# Patient Record
Sex: FEMALE | Race: ASIAN | Employment: FULL TIME | URBAN - METROPOLITAN AREA
[De-identification: names, ages, dates, MRNs, and addresses within clinical notes are randomized per-mention and may not be internally consistent; named-entity substitution may affect disease eponyms.]

---

## 2017-04-13 LAB
ANTIBODY SCREEN, EXTERNAL: NEGATIVE
CHLAMYDIA, EXTERNAL: NEGATIVE
HBSAG, EXTERNAL: NORMAL
HIV, EXTERNAL: NORMAL
N. GONORRHEA, EXTERNAL: NEGATIVE
RPR, EXTERNAL: NORMAL
RUBELLA, EXTERNAL: NORMAL
TYPE, ABO & RH, EXTERNAL: NORMAL

## 2017-08-20 ENCOUNTER — HOSPITAL ENCOUNTER (EMERGENCY)
Age: 37
Discharge: HOME | End: 2017-08-20
Attending: SPECIALIST | Admitting: SPECIALIST
Payer: COMMERCIAL

## 2017-08-20 VITALS
TEMPERATURE: 98.4 F | WEIGHT: 144 LBS | HEART RATE: 70 BPM | RESPIRATION RATE: 16 BRPM | BODY MASS INDEX: 20.62 KG/M2 | DIASTOLIC BLOOD PRESSURE: 63 MMHG | HEIGHT: 70 IN | SYSTOLIC BLOOD PRESSURE: 120 MMHG

## 2017-08-20 PROCEDURE — 81003 URINALYSIS AUTO W/O SCOPE: CPT

## 2017-08-20 PROCEDURE — 99283 EMERGENCY DEPT VISIT LOW MDM: CPT

## 2017-08-20 PROCEDURE — 76815 OB US LIMITED FETUS(S): CPT

## 2017-08-20 NOTE — IP AVS SNAPSHOT
Garret Beckford 
 
 
 [address] [place] [address] [address] Patient: Judie Leonard 
MRN: [id number] RSS:8/72/[id number] Current Discharge Medication List  
  
Notice You have not been prescribed any medications.

## 2017-08-20 NOTE — IP AVS SNAPSHOT
[address] Patient: Prisclila Heller 
MRN: [id number] RLT:5/29/4304 You are allergic to the following No active allergies Recent Documentation Height Weight BMI OB Status Smoking Status 1.778 m 65.3 kg 20.66 kg/m2 Pregnant Never Smoker About your hospitalization You were admitted on:  N/A You last received care in the:  [address] [place] [address] You were discharged on:  August 20, 2017 Unit phone number:  [phone] Why you were hospitalized Your primary diagnosis was:  Not on File Providers Seen During Your Hospitalizations Provider Role Specialty Primary office phone Isa Tijerina MD Attending Provider Obstetrics & Gynecology [phone] Your Primary Care Physician (PCP) Primary Care Physician Office Phone Office Fax UNKNOWN, PROVIDER ** None ** ** None ** Follow-up Information Follow up With Details Comments Contact Info Provider Unknown   Patient not available to ask Current Discharge Medication List  
  
Notice You have not been prescribed any medications. Discharge Instructions None Discharge Instructions Attachments/References PREGNANCY: WEEKS 22 TO 26 (ENGLISH) PREGNANCY: WHEN TO CALL (AFTER 20 WEEKS): GENERAL INFO (ENGLISH) Discharge Orders None Introducing [facility]! [facility] introduces [employer] patient portal. Now you can access parts of your medical record, email your doctor's office, and request medication refills online. 1. In your internet browser, go to [email or web address] 2. Click on the First Time User? Click Here link in the Sign In box. You will see the New Member Sign Up page. 3. Enter your [employer] Access Code exactly as it appears below.  You will not need to use this code after youve completed the sign-up process. If you do not sign up before the expiration date, you must request a new code. · [employer] Access Code: [email or web address] Expires: 11/18/2017  8:54 PM 
 
4. Enter the last four digits of your Social Security Number (xxxx) and Date of Birth (mm/dd/yyyy) as indicated and click Submit. You will be taken to the next sign-up page. 5. Create a [employer] ID. This will be your [employer] login ID and cannot be changed, so think of one that is secure and easy to remember. 6. Create a [employer] password. You can change your password at any time. 7. Enter your Password Reset Question and Answer. This can be used at a later time if you forget your password. 8. Enter your e-mail address. You will receive e-mail notification when new information is available in [address] 9. Click Sign Up. You can now view and download portions of your medical record. 10. Click the Download Summary menu link to download a portable copy of your medical information. If you have questions, please visit the Frequently Asked Questions section of the [employer] website. Remember, [employer] is NOT to be used for urgent needs. For medical emergencies, dial 911. Now available from your iPhone and Android! General Information Please provide this summary of care documentation to your next provider. Patient Signature:  ____________________________________________________________ Date:  ____________________________________________________________  
  
Caroline Stuart Provider Signature:  ____________________________________________________________ Date:  ____________________________________________________________ More Information Weeks 22 to 26 of Your Pregnancy: Care Instructions Your Care Instructions As you enter your 7th month of pregnancy at week 26, your baby's lungs are growing stronger and getting ready to breathe. You may notice that your baby responds to the sound of your or your partner's voice. You may also notice that your baby does less turning and twisting and more squirming or jerking. Jerking often means that your baby has the hiccups. Hiccups are perfectly normal and are only temporary. You may want to think about attending a childbirth preparation class. This is also a good time to start thinking about whether you want to have pain medicine during labor. Most pregnant women are tested for gestational diabetes between weeks 25 and 28. Gestational diabetes occurs when your blood sugar level gets too high when you're pregnant. The test is important, because you can have gestational diabetes and not know it. But the condition can cause problems for your baby. Follow-up care is a key part of your treatment and safety. Be sure to make and go to all appointments, and call your doctor if you are having problems. It's also a good idea to know your test results and keep a list of the medicines you take. How can you care for yourself at home? Ease discomfort from your baby's kicking · Change your position. Sometimes this will cause your baby to change position too. · Take a deep breath while you raise your arm over your head. Then breathe out while you drop your arm. Do Kegel exercises to prevent urine from leaking · You can do Kegel exercises while you stand or sit. ¨ Squeeze the same muscles you would use to stop your urine. Your belly and thighs should not move. ¨ Hold the squeeze for 3 seconds, and then relax for 3 seconds. ¨ Start with 3 seconds. Then add 1 second each week until you are able to squeeze for 10 seconds. ¨ Repeat the exercise 10 to 15 times for each session. Do three or more sessions each day. Ease or reduce swelling in your feet, ankles, hands, and fingers · If your fingers are puffy, take off your rings. · Do not eat high-salt foods, such as potato chips. · Prop up your feet on a stool or couch as much as possible. Sleep with pillows under your feet. · Do not stand for long periods of time or wear tight shoes. · Wear support stockings. Where can you learn more? Go to http://rodney-lambert.info/. Enter G264 in the search box to learn more about \"Weeks 22 to 26 of Your Pregnancy: Care Instructions. \" Current as of: March 16, 2017 Content Version: 11.3 © 9008-2662 [employer]. Care instructions adapted under license by [employer] (which disclaims liability or warranty for this information). If you have questions about a medical condition or this instruction, always ask your healthcare professional. [place] [address] any warranty or liability for your use of this information. Learning About When to Call Your Doctor During Pregnancy (After 20 Weeks) Your Care Instructions It's common to have concerns about what might be a problem during pregnancy. Although most pregnant women don't have any serious problems, it's important to know when to call your doctor if you have certain symptoms or signs of labor. These are general suggestions. Your doctor may give you some more information about when to call. When to call your doctor (after 20 weeks) Call 911 anytime you think you may need emergency care. For example, call if: 
· You have severe vaginal bleeding. · You have sudden, severe pain in your belly. · You passed out (lost consciousness). · You have a seizure. · You see or feel the umbilical cord. · You think you are about to deliver your baby and can't make it safely to the hospital. 
Call your doctor now or seek immediate medical care if: 
· You have vaginal bleeding. · You have belly pain. · You have a fever. · You have symptoms of preeclampsia, such as: 
¨ Sudden swelling of your face, hands, or feet. ¨ New vision problems (such as dimness or blurring). ¨ A severe headache. · You have a sudden release of fluid from your vagina. (You think your water broke.) · You think that you may be in labor. This means that you've had at least 4 contractions within 20 minutes or at least 8 contractions in an hour. · You notice that your baby has stopped moving or is moving much less than normal. 
· You have symptoms of a urinary tract infection. These may include: 
¨ Pain or burning when you urinate. ¨ A frequent need to urinate without being able to pass much urine. ¨ Pain in the flank, which is just below the rib cage and above the waist on either side of the back. ¨ Blood in your urine. Watch closely for changes in your health, and be sure to contact your doctor if: 
· You have vaginal discharge that smells bad. · You have skin changes, such as: ¨ A rash. ¨ Itching. ¨ Yellow color to your skin. · You have other concerns about your pregnancy. If you have labor signs at 37 weeks or more If you have signs of labor at 37 weeks or more, your doctor may tell you to call when your labor becomes more active. Symptoms of active labor include: 
· Contractions that are regular. · Contractions that are less than 5 minutes apart. · Contractions that are hard to talk through. Follow-up care is a key part of your treatment and safety. Be sure to make and go to all appointments, and call your doctor if you are having problems. It's also a good idea to know your test results and keep a list of the medicines you take. Where can you learn more? Go to http://rodney-lambert.info/. Enter  in the search box to learn more about \"Learning About When to Call Your Doctor During Pregnancy (After 20 Weeks). \" 
Current as of: March 16, 2017 Content Version: 11.3 © 3895-8210 [employer].  Care instructions adapted under license by [employer] (which disclaims liability or warranty for this information). If you have questions about a medical condition or this instruction, always ask your healthcare professional. [place] [address] any warranty or liability for your use of this information.

## 2017-08-20 NOTE — IP AVS SNAPSHOT
Summary of Care Report The Summary of Care report has been created to help improve care coordination. Users with access to [employer] or [address] (Web-based application) may access additional patient information including the Discharge Summary. If you are not currently a [address] user and need more information, please call the number listed below in the [place] [address] section and ask to be connected with Medical Records. Facility Information Name Address Phone [facility] [address] [facility] [address] [phone] Patient Information Patient Name Sex Areatha Charmaine Jordan ([id number]) Female 1980 Discharge Information Admitting Provider Service Area Unit Richa Andrade MD / [address] Labor & Delivery / [phone] Discharge Provider Discharge Date/Time Discharge Disposition Destination (none) 8/20/2017 (Pending) AHR (none) Patient Language Language ENGLISH [13] You are allergic to the following No active allergies Current Discharge Medication List  
  
Notice You have not been prescribed any medications. Follow-up Information Follow up With Details Comments Contact Info Provider Unknown   Patient not available to ask Discharge Instructions None Chart Review Routing History No Routing History on File

## 2017-08-21 LAB
APPEARANCE UR: CLEAR
BILIRUB UR QL: NEGATIVE
COLOR UR: YELLOW
GLUCOSE UR QL STRIP.AUTO: NEGATIVE MG/DL
KETONES UR-MCNC: NEGATIVE MG/DL
LEUKOCYTE ESTERASE UR QL STRIP: ABNORMAL
NITRITE UR QL: NEGATIVE
PH UR: 7 [PH] (ref 5–9)
PROT UR QL: NEGATIVE MG/DL
RBC # UR STRIP: ABNORMAL /UL
SERVICE CMNT-IMP: ABNORMAL
SP GR UR: 1.01 (ref 1–1.02)
UROBILINOGEN UR QL: 0.2 EU/DL (ref 0.2–1)

## 2017-08-21 NOTE — PROGRESS NOTES
2000 - Patient arrived to unit via wheelchair with complaints of vaginal pressure that has not subsided after it started 08/18/17 after a more than usually active day. Patient describes it as a dull pressure rates it at 4/10. Denies bleeding, leaking of fluid or decreased fetal movement. Dr. Jaret Bravo aware of patients admission. 2021 - Dr. Jaret Bravo at bedside for assessment of patient. SVE Closed/Thick/High per Dr. Jaret Bravo. 2030 - FHR monitor removed reassuring per Dr. Jaret rBavo. 2033 - Abdominal ultrasound done at bedside. 2039 - Patient discussing plan of care with Dr. Jaret Bravo. 2048 -  Orders to discharge with follow up to office.

## 2017-08-21 NOTE — H&P
[address] visit Moderate severity CPT 06531. Wet Smear CPT [id number]    Patient's Name:  Ketty Mcintyre. MRN: [id number]. : 1980. Date of Service:  2017  8:59 PM  Physician:  Landon Infante MD    IDENTIFYING DATA  Ketty Mcintyre is a  43 y.o. pregnant patient. Estimated Date of Delivery: 17  Her Estimated Gestational Age is 23w3d weeks. IMPRESSION:    [place]. Increase in pelvic pressure thereaf  Musculoskeletal discomfort with no  Evidence of cervical shortening      RECOMMENDATIONS:    Home to rest and avoid activities  That aggravate discomfort,  nothing per vagina for 3 weeks. Consider progesterone supplementation in light of previous  labor       Subjective:   Chief complaint:    Chief Complaint   Patient presents with    Abdominal Pain     Vaginal pressure     Estimated Date of Delivery: 17  OB History      Para Term  AB Living    7 4 1 3 2 4    SAB TAB Ectopic Molar Multiple Live Births     2    4          On admission membranes are  Intact  On admission EFM strip is obtained and is Category 1. She reports onset of symptoms at around This morning   She is admitted for Observation and evaluation for cervical weakening or  labor. This patient has 4 living children  And subsequently had a tubal ligation she had a tubal ligation reversed in 2017  She very promptly became pregnant but is now at increased risk for   labor based on a previous 30 week delivery. She went to [facility] and did a lot of walking in a [facility]. She also reports doing a lot of lifting and straining. Evaluation at the hospital revealed no laboring pattern and a   Reassuring fetal heart rate tracing.   Ultrasound revealed a reynolds vertex infant with good movement and tone normal amniotic fluid volume and an anterior placenta that was nNot low.   The abdominal view of the cervix  Revealed a length greater than 3 cm. Pelvic examination confirmed the cervix was a normal size and shape. There was no cervical dilation. I discussed the patient's situation  And answered her questions. She is to keep her appointment in  [facility] this next Thursday and to phone our office in the next  Couple of days to make plans regarding progesterone supplementation. OB HISTORY:    OB History      Para Term  AB Living    7 4 1 3 2 4    SAB TAB Ectopic Molar Multiple Live Births     2    4          PAST MEDICAL HISTORY: History reviewed. No pertinent past medical history. PAST SURGICAL HISTORY:   Past Surgical History:   Procedure Laterality Date    BREAST SURGERY PROCEDURE UNLISTED      HX BREAST AUGMENTATION Bilateral 2013    HX OTHER SURGICAL  2017    tubal reversal    HX TUBAL LIGATION         SOCIAL HISTORY:    Social History     Social History    Marital status:      Spouse name: N/A    Number of children: N/A    Years of education: N/A     Occupational History    Not on file. Social History Main Topics    Smoking status: Never Smoker    Smokeless tobacco: Never Used    Alcohol use No    Drug use: No    Sexual activity: Yes     Partners: Male     Other Topics Concern    Not on file     Social History Narrative    No narrative on file       FAMILY HISTORY  History reviewed. No pertinent family history. ALLERGY:  No Known Allergies    HOME MEDICATIONS:   Prior to Admission medications    Not on File              CERVICAL EXAM: (data input under \"more activities\" and \"flowsheets\" at the top.)  Cervical Exam  Dilation (cm): 0 (closed)  Vaginal exam done by? : Dr. Bryan Rojo  Membrane Status: Intact    FETAL MONITORING:  Baseline FHR: Patient Vitals for the past 2 hrs: Mode Fetal Heart Rate Fetal Activity Variability Decelerations Accelerations RN Reviewed Strip? Provider who reviewed strip?  Non Stress Test 08/20/17 2031 External 150 Present 6-25 BPM None Yes Yes Dr. Celestino Cordero  Reactive   08/20/17 2014 External 150 Present - - - - - -       REVIEW OF SYMPTOMS:   Constitutional: fever, chills denied. Head, Ears, Nose Throat:   Ear pain denied. Sore throat denied. Eyes: Pain denied. Visual disturbance denied. Respiratory: Cough denied. Shortness of breath denied. Cardiovascular: Chest pains denied. Gastrointestinal:   Nausea denied. Vomiting denied. Diarrhea denied. Constipation denied. Genitourinary:   Vaginal Bleeding denied. Vaginal Odor denied. Painful Urination denied. Blood in Urine denied. Pain over kidneys denied. Musculoskeletal: Back pain denied, Joint pain denied. Skin:  Rash denied. Injuries denied. Neurological:    Headaches denied. Dizziness denied. Seizures denied. Psychiatric/Behavioral:   Major mood problems denied. Objective:     Physical Exam:     Visit Vitals    /63    Pulse 70    Temp 98.4 °F (36.9 °C)    Resp 16    Ht 5' 10\" (1.778 m)    Wt 65.3 kg (144 lb)    BMI 20.66 kg/m2     General appearance:   Alert, oriented to person, place, and time, cooperative, no distress, appears stated age and is well-developed and well-nourished. Head, Nose, Throat: Normocephalic, atraumatic. Eyes: Conjunctivae appear normal.  Pupils are equal and round. Neck: Normal range of motion. Supple. Heart:  Regular rate and Rhythm. Lungs: Effort normal, No apparent respiratory distress, Wheezes or Cough. Abdomen: Abnormal tenderness not detected. Scars None detected. External genitalia: normal appearance without obvious lesions. Bartholin's,  [place]ake[place]'s, Urethra: Normal.   Vaginal Vault:  Discharge or Odor not detected. Blood not detected. Foreign body or Injury not detected. Cervix was long thick and closed. Uterus:  Appropriate size for gestational age. Musculoskeletal: Normal range of motion.    Neurological: Loss of strength or sensation not detected. Disorientation to time, person, place not detected. Skin: Lesions not detected. Rashes not detected. Extremities:  Trauma not detected. Swelling or edema not detected. Psychiatric: Abnormal mood or affect not detected.      Signed By:    Ronna Srinivasan MD  August 20, 2017 8:59 PM

## 2017-09-10 ENCOUNTER — HOSPITAL ENCOUNTER (OUTPATIENT)
Age: 37
Setting detail: OBSERVATION
Discharge: HOME | DRG: 778 | End: 2017-09-11
Attending: SPECIALIST | Admitting: OBSTETRICS & GYNECOLOGY
Payer: COMMERCIAL

## 2017-09-10 PROBLEM — O09.219 CURRENT PREGNANCY WITH HISTORY OF PRETERM LABOR: Status: ACTIVE | Noted: 2017-09-10

## 2017-09-10 LAB
APPEARANCE UR: CLEAR
BASOPHILS # BLD: 0 K/UL (ref 0–0.06)
BASOPHILS NFR BLD: 0 % (ref 0–2)
BILIRUB UR QL: NEGATIVE
COLOR UR: YELLOW
DIFFERENTIAL METHOD BLD: ABNORMAL
EOSINOPHIL # BLD: 0 K/UL (ref 0–0.4)
EOSINOPHIL NFR BLD: 1 % (ref 0–5)
ERYTHROCYTE [DISTWIDTH] IN BLOOD BY AUTOMATED COUNT: 12.8 % (ref 11.6–14.5)
GLUCOSE UR QL STRIP.AUTO: NEGATIVE MG/DL
HCT VFR BLD AUTO: 33.7 % (ref 35–45)
HGB BLD-MCNC: 11.3 G/DL (ref 12–16)
KETONES UR-MCNC: NEGATIVE MG/DL
LEUKOCYTE ESTERASE UR QL STRIP: NEGATIVE
LYMPHOCYTES # BLD: 0.7 K/UL (ref 0.9–3.6)
LYMPHOCYTES NFR BLD: 10 % (ref 21–52)
MCH RBC QN AUTO: 29.7 PG (ref 24–34)
MCHC RBC AUTO-ENTMCNC: 33.5 G/DL (ref 31–37)
MCV RBC AUTO: 88.5 FL (ref 74–97)
MONOCYTES # BLD: 0.3 K/UL (ref 0.05–1.2)
MONOCYTES NFR BLD: 4 % (ref 3–10)
NEUTS SEG # BLD: 6.3 K/UL (ref 1.8–8)
NEUTS SEG NFR BLD: 85 % (ref 40–73)
NITRITE UR QL: NEGATIVE
PH UR: 7 [PH] (ref 5–9)
PLATELET # BLD AUTO: 156 K/UL (ref 135–420)
PMV BLD AUTO: 11.3 FL (ref 9.2–11.8)
PROT UR QL: NEGATIVE MG/DL
RBC # BLD AUTO: 3.81 M/UL (ref 4.2–5.3)
RBC # UR STRIP: ABNORMAL /UL
SERVICE CMNT-IMP: ABNORMAL
SP GR UR: 1.01 (ref 1–1.02)
UROBILINOGEN UR QL: 1 EU/DL (ref 0.2–1)
WBC # BLD AUTO: 7.4 K/UL (ref 4.6–13.2)

## 2017-09-10 PROCEDURE — 85025 COMPLETE CBC W/AUTO DIFF WBC: CPT | Performed by: OBSTETRICS & GYNECOLOGY

## 2017-09-10 PROCEDURE — 74011250636 HC RX REV CODE- 250/636: Performed by: OBSTETRICS & GYNECOLOGY

## 2017-09-10 PROCEDURE — 87086 URINE CULTURE/COLONY COUNT: CPT | Performed by: OBSTETRICS & GYNECOLOGY

## 2017-09-10 PROCEDURE — 65270000029 HC RM PRIVATE

## 2017-09-10 PROCEDURE — 99218 HC RM OBSERVATION: CPT

## 2017-09-10 PROCEDURE — 81003 URINALYSIS AUTO W/O SCOPE: CPT

## 2017-09-10 PROCEDURE — 74011250637 HC RX REV CODE- 250/637: Performed by: SPECIALIST

## 2017-09-10 PROCEDURE — 74011250637 HC RX REV CODE- 250/637: Performed by: OBSTETRICS & GYNECOLOGY

## 2017-09-10 PROCEDURE — 77030020255 HC SOL INJ LR 1000ML BG

## 2017-09-10 PROCEDURE — 36415 COLL VENOUS BLD VENIPUNCTURE: CPT | Performed by: OBSTETRICS & GYNECOLOGY

## 2017-09-10 PROCEDURE — 87081 CULTURE SCREEN ONLY: CPT | Performed by: OBSTETRICS & GYNECOLOGY

## 2017-09-10 RX ORDER — SODIUM CHLORIDE, SODIUM LACTATE, POTASSIUM CHLORIDE, CALCIUM CHLORIDE 600; 310; 30; 20 MG/100ML; MG/100ML; MG/100ML; MG/100ML
150 INJECTION, SOLUTION INTRAVENOUS CONTINUOUS
Status: DISCONTINUED | OUTPATIENT
Start: 2017-09-10 | End: 2017-09-11 | Stop reason: HOSPADM

## 2017-09-10 RX ORDER — DIPHENHYDRAMINE HYDROCHLORIDE 50 MG/ML
12.5 INJECTION, SOLUTION INTRAMUSCULAR; INTRAVENOUS
Status: DISCONTINUED | OUTPATIENT
Start: 2017-09-10 | End: 2017-09-11 | Stop reason: HOSPADM

## 2017-09-10 RX ORDER — ZOLPIDEM TARTRATE 5 MG/1
5 TABLET ORAL
Status: DISCONTINUED | OUTPATIENT
Start: 2017-09-10 | End: 2017-09-11 | Stop reason: HOSPADM

## 2017-09-10 RX ORDER — BETAMETHASONE SODIUM PHOSPHATE AND BETAMETHASONE ACETATE 3; 3 MG/ML; MG/ML
INJECTION, SUSPENSION INTRA-ARTICULAR; INTRALESIONAL; INTRAMUSCULAR; SOFT TISSUE
Status: DISPENSED
Start: 2017-09-10 | End: 2017-09-11

## 2017-09-10 RX ORDER — ONDANSETRON 2 MG/ML
4 INJECTION INTRAMUSCULAR; INTRAVENOUS
Status: DISCONTINUED | OUTPATIENT
Start: 2017-09-10 | End: 2017-09-11 | Stop reason: HOSPADM

## 2017-09-10 RX ORDER — BETAMETHASONE SODIUM PHOSPHATE AND BETAMETHASONE ACETATE 3; 3 MG/ML; MG/ML
12 INJECTION, SUSPENSION INTRA-ARTICULAR; INTRALESIONAL; INTRAMUSCULAR; SOFT TISSUE EVERY 24 HOURS
Status: COMPLETED | OUTPATIENT
Start: 2017-09-10 | End: 2017-09-11

## 2017-09-10 RX ORDER — SUCRALFATE 1 G/1
1 TABLET ORAL
Status: DISCONTINUED | OUTPATIENT
Start: 2017-09-10 | End: 2017-09-11 | Stop reason: HOSPADM

## 2017-09-10 RX ORDER — SUCRALFATE 1 G/1
1 TABLET ORAL
Status: DISCONTINUED | OUTPATIENT
Start: 2017-09-10 | End: 2017-09-10

## 2017-09-10 RX ORDER — SODIUM CHLORIDE 0.9 % (FLUSH) 0.9 %
5-10 SYRINGE (ML) INJECTION EVERY 8 HOURS
Status: DISCONTINUED | OUTPATIENT
Start: 2017-09-10 | End: 2017-09-11 | Stop reason: HOSPADM

## 2017-09-10 RX ORDER — INDOMETHACIN 25 MG/1
50 CAPSULE ORAL EVERY 6 HOURS
Status: DISCONTINUED | OUTPATIENT
Start: 2017-09-10 | End: 2017-09-11 | Stop reason: HOSPADM

## 2017-09-10 RX ORDER — SODIUM CHLORIDE 0.9 % (FLUSH) 0.9 %
5-10 SYRINGE (ML) INJECTION AS NEEDED
Status: DISCONTINUED | OUTPATIENT
Start: 2017-09-10 | End: 2017-09-11 | Stop reason: HOSPADM

## 2017-09-10 RX ADMIN — SUCRALFATE 1 G: 1 TABLET ORAL at 17:57

## 2017-09-10 RX ADMIN — SODIUM CHLORIDE, SODIUM LACTATE, POTASSIUM CHLORIDE, AND CALCIUM CHLORIDE 1000 ML: 600; 310; 30; 20 INJECTION, SOLUTION INTRAVENOUS at 14:48

## 2017-09-10 RX ADMIN — INDOMETHACIN 50 MG: 25 CAPSULE ORAL at 19:51

## 2017-09-10 RX ADMIN — SODIUM CHLORIDE, SODIUM LACTATE, POTASSIUM CHLORIDE, AND CALCIUM CHLORIDE 150 ML/HR: 600; 310; 30; 20 INJECTION, SOLUTION INTRAVENOUS at 22:02

## 2017-09-10 RX ADMIN — SUCRALFATE 1 G: 1 TABLET ORAL at 21:54

## 2017-09-10 RX ADMIN — SUCRALFATE 1 G: 1 TABLET ORAL at 13:45

## 2017-09-10 RX ADMIN — ZOLPIDEM TARTRATE 5 MG: 5 TABLET ORAL at 21:54

## 2017-09-10 RX ADMIN — INDOMETHACIN 50 MG: 25 CAPSULE ORAL at 13:44

## 2017-09-10 RX ADMIN — SODIUM CHLORIDE, SODIUM LACTATE, POTASSIUM CHLORIDE, AND CALCIUM CHLORIDE 150 ML/HR: 600; 310; 30; 20 INJECTION, SOLUTION INTRAVENOUS at 14:50

## 2017-09-10 RX ADMIN — BETAMETHASONE SODIUM PHOSPHATE AND BETAMETHASONE ACETATE 12 MG: 3; 3 INJECTION, SUSPENSION INTRA-ARTICULAR; INTRALESIONAL; INTRAMUSCULAR at 13:10

## 2017-09-10 NOTE — IP AVS SNAPSHOT
Summary of Care Report The Summary of Care report has been created to help improve care coordination. Users with access to [employer] or [address] (Web-based application) may access additional patient information including the Discharge Summary. If you are not currently a [address] user and need more information, please call the number listed below in the [place] [address] section and ask to be connected with Medical Records. Facility Information Name Address Phone [facility] [address] [facility] [address] [id number] [phone] Patient Information Patient Name Sex [place] Jeni Dotson ([id number]) Female 1980 Discharge Information Admitting Provider Service Area Unit Norberto Lazcano MD / [address] Labor & Delivery / [phone] Discharge Provider Discharge Date/Time Discharge Disposition Destination (none) 2017 Afternoon (Pending) AHR (none) Patient Language Language ENGLISH [13] Hospital Problems as of 2017  Never Reviewed Class Noted - Resolved Last Modified POA Active Problems Current pregnancy with history of  labor  9/10/2017 - Present 9/10/2017 by Norberto Lazcano MD Unknown Entered by Norberto Lazcano MD  
  
You are allergic to the following No active allergies Current Discharge Medication List  
  
Notice You have not been prescribed any medications. Follow-up Information Follow up With Details Comments Contact Info Provider Unknown   Patient not available to ask Discharge Instructions Call Dr Karuna Buchanan if contractions, bleeding, decreased fetal movement or any other concerns Chart Review Routing History No Routing History on File

## 2017-09-10 NOTE — PROGRESS NOTES
44 yo aaf,  ambulating in from home with c/o uc since last night q 30-60  Min, 4/10. Pt is able to slepp through uc, eric srom, denies vag bleeding,denies PIH s/s    Hx ptl 40, 30 36, 36 weeks. Last vag delivery 14 yrs ago  sve and ffn done in dr Huerta office by mychal on Friday,2 days ago.  Pt was long thick and closed (appt for pelvic pressure) ffn is positive

## 2017-09-10 NOTE — H&P
History & Physical    Name: Lakeshia De La Torre MRN: [id number]  SSN: [id number]    YOB: 1980  Age: 43 y.o. Sex: female        Subjective:     Estimated Date of Delivery: 17  OB History      Para Term  AB Living    7 4 1 3 2 4    SAB TAB Ectopic Molar Multiple Live Births     2    4          Ms. Nancye Closs is admitted with pregnancy at 26w3d for ctx. Pt states had been fine until I called her yesterday AM and dw her +FFN result from office Friday. Since then pt has not felt good. States legs are crampy and note some ctx maria eugenia 15 - 30 minutes that are different than the [employer] CTx she felt earlier. States did not get Ol[place] but has been followed by serial cervical lengths and has been over 3.5 cm. . Prenatal course noted for h/o PT D at 30 weeks and 2 deliveries at 36 weeks. Pt had tubal reversal and quickly got pregnant afterwards. Last delivery over 10 years ago. Prenatal care has been followed by Dr. Osiel Thomas. Please see prenatal records for details. Pt has not had any vaginal bleeding. Denies Dysuria or SROM. No past medical history on file. Past Surgical History:   Procedure Laterality Date    BREAST SURGERY PROCEDURE UNLISTED      HX BREAST AUGMENTATION Bilateral 2013    HX OTHER SURGICAL  2017    tubal reversal    HX TUBAL LIGATION       Social History     Occupational History    Not on file. Social History Main Topics    Smoking status: Never Smoker    Smokeless tobacco: Never Used    Alcohol use No    Drug use: No    Sexual activity: Yes     Partners: Male     No family history on file. No Known Allergies  Prior to Admission medications    Not on File        Review of Systems: Pertinent items are noted in HPI. Objective:     Vitals:  Vitals:    09/10/17 1235   Weight: 70.8 kg (156 lb)   Height: 5' 10\" (1.778 m)        Physical Exam:  Patient without distress.   Abdomen: soft, nontender  Fundus: soft and non tender  Perineum: blood absent, amniotic fluid absent  Cervical Exam: Consistency: Soft  External Os 5 mm/50%/-3  Intenal os clds. Lower Extremities:  - Edema No  Membranes:  Intact  Fetal Heart Rate: Appropriate for 26 wks without decelerations  Uterine contractions: regular, every 6 minutes, difficult to palpate and trace    Prenatal Labs:   No results found for: RUBELLAEXT, GRBSEXT, HBSAGEXT, HIVEXT, RPREXT, GONNOEXT, CHLAMEXT      Assessment/Plan:     Plan: Admit for  ctx in light of FFN + and history of PTD at 30 and 36 wks x2. D/W pt need for corticosteroids and familiar  with them. Will use some idocin to see if helps with ctx. She will need to let us know how she is feeling as difficult to trace.   . Will send GBS and urine culture     Signed By:  Carlo Cardenas MD     September 10, 2017

## 2017-09-10 NOTE — IP AVS SNAPSHOT
Drake Cabrera 
 
 
 [address] [place] [address] Patient: Luis Huber 
MRN: [id number] DQY: You are allergic to the following No active allergies Recent Documentation Height Weight BMI OB Status Smoking Status 1.778 m 70.8 kg 22.38 kg/m2 Pregnant Never Smoker Unresulted Labs Order Current Status CULTURE, GENITAL GROUP B STREP Preliminary result CULTURE, URINE Preliminary result Emergency Contacts Name Discharge Info Relation Home Work Mobile Supa Swanson DISCHARGE CAREGIVER [3] Spouse [3]   [phone] About your hospitalization You were admitted on:  September 10, 2017 You last received care in the:  [address] [place] [address] You were discharged on:  2017 Unit phone number:  [phone] Why you were hospitalized Your primary diagnosis was:  Not on File Your diagnoses also included:  Current Pregnancy With History Of  Labor Providers Seen During Your Hospitalizations Provider Role Specialty Primary office phone Mikhail Neely MD Attending Provider Obstetrics & Gynecology [phone] Adele Medrano MD Attending Provider Obstetrics & Gynecology [phone] Your Primary Care Physician (PCP) Primary Care Physician Office Phone Office Fax UNKNOWN, PROVIDER ** None ** ** None ** Follow-up Information Follow up With Details Comments Contact Info Provider Unknown   Patient not available to ask Current Discharge Medication List  
  
Notice You have not been prescribed any medications. Discharge Instructions Call Dr Dinorah Elmore if contractions, bleeding, decreased fetal movement or any other concerns Discharge Instructions Attachments/References PREGNANCY: PRECAUTIONS (ENGLISH) Discharge Orders None MyChart Announcement We are excited to announce that we are making your provider's discharge notes available to you in [facility]. You will see these notes when they are completed and signed by the physician that discharged you from your recent hospital stay. If you have any questions or concerns about any information you see in [facility], please call the Health Information Department where you were seen or reach out to your Primary Care Provider for more information about your plan of care. Introducing [facility]! Suzanne Hutson introduces [facility] patient portal. Now you can access parts of your medical record, email your doctor's office, and request medication refills online. 1. In your internet browser, go to [email or web address] 2. Click on the First Time User? Click Here link in the Sign In box. You will see the New Member Sign Up page. 3. Enter your [facility] Access Code exactly as it appears below. You will not need to use this code after youve completed the sign-up process. If you do not sign up before the expiration date, you must request a new code. · [facility] Access Code: [email or web address] Expires: 11/18/2017  8:54 PM 
 
4. Enter the last four digits of your Social Security Number (xxxx) and Date of Birth (mm/dd/yyyy) as indicated and click Submit. You will be taken to the next sign-up page. 5. Create a [facility] ID. This will be your [facility] login ID and cannot be changed, so think of one that is secure and easy to remember. 6. Create a [facility] password. You can change your password at any time. 7. Enter your Password Reset Question and Answer. This can be used at a later time if you forget your password. 8. Enter your e-mail address. You will receive e-mail notification when new information is available in [address] 9. Click Sign Up. You can now view and download portions of your medical record.  
10. Click the Download Summary menu link to download a portable copy of your medical information. If you have questions, please visit the Frequently Asked Questions section of the [facility] website. Remember, [facility] is NOT to be used for urgent needs. For medical emergencies, dial 911. Now available from your iPhone and Android! General Information Please provide this summary of care documentation to your next provider. Patient Signature:  ____________________________________________________________ Date:  ____________________________________________________________  
  
Jeyson Dawson Provider Signature:  ____________________________________________________________ Date:  ____________________________________________________________ More Information Pregnancy Precautions: Care Instructions Your Care Instructions There is no sure way to prevent labor before your due date ( labor) or to prevent most other pregnancy problems. But there are things you can do to increase your chances of a healthy pregnancy. Go to your appointments, follow your doctor's advice, and take good care of yourself. Eat well, and exercise (if your doctor agrees). And make sure to drink plenty of water. Follow-up care is a key part of your treatment and safety. Be sure to make and go to all appointments, and call your doctor if you are having problems. It's also a good idea to know your test results and keep a list of the medicines you take. How can you care for yourself at home? · Make sure you go to your prenatal appointments. At each visit, your doctor will check your blood pressure. Your doctor will also check to see if you have protein in your urine. High blood pressure and protein in urine are signs of preeclampsia. This condition can be dangerous for you and your baby. · Drink plenty of fluids, enough so that your urine is light yellow or clear like water. Dehydration can cause contractions.  If you have kidney, heart, or liver disease and have to limit fluids, talk with your doctor before you increase the amount of fluids you drink. · Tell your doctor right away if you notice any symptoms of an infection, such as: ¨ Burning when you urinate. ¨ A foul-smelling discharge from your vagina. ¨ Vaginal itching. ¨ Unexplained fever. ¨ Unusual pain or soreness in your uterus or lower belly. · Eat a balanced diet. Include plenty of foods that are high in calcium and iron. ¨ Foods high in calcium include milk, cheese, yogurt, almonds, and broccoli. ¨ Foods high in iron include red meat, shellfish, poultry, eggs, beans, raisins, whole-grain bread, and leafy green vegetables. · Do not smoke. If you need help quitting, talk to your doctor about stop-smoking programs and medicines. These can increase your chances of quitting for good. · Do not drink alcohol or use illegal drugs. · Follow your doctor's directions about activity. Your doctor will let you know how much, if any, exercise you can do. · Ask your doctor if you can have sex. If you are at risk for early labor, your doctor may ask you to not have sex. · Take care to prevent falls. During pregnancy, your joints are loose, and your balance is off. Sports such as bicycling, skiing, or in-line skating can increase your risk of falling. And don't ride horses or motorcycles, dive, water ski, scuba dive, or parachute jump while you are pregnant. · Avoid getting very hot. Do not use saunas or hot tubs. Avoid staying out in the sun in hot weather for long periods. Take acetaminophen (Tylenol) to lower a high fever. · Do not take any over-the-counter or herbal medicines or supplements without talking to your doctor or pharmacist first. 
When should you call for help? Call 911 anytime you think you may need emergency care. For example, call if: 
· You passed out (lost consciousness). · You have severe vaginal bleeding. · You have severe pain in your belly or pelvis. · You have had fluid gushing or leaking from your vagina and you know or think the umbilical cord is bulging into your vagina. If this happens, immediately get down on your knees so your rear end (buttocks) is higher than your head. This will decrease the pressure on the cord until help arrives. Call your doctor now or seek immediate medical care if: 
· You have signs of preeclampsia, such as: 
¨ Sudden swelling of your face, hands, or feet. ¨ New vision problems (such as dimness or blurring). ¨ A severe headache. · You have any vaginal bleeding. · You have belly pain or cramping. · You have a fever. · You have had 6 or more contractions in 1 hour with rest & fluids. · You have a sudden release of fluid from your vagina. · You have low back pain or pelvic pressure that does not go away. · You notice that your baby has stopped moving or is moving much less than normal. 
Watch closely for changes in your health, and be sure to contact your doctor if you have any problems. Where can you learn more? Go to http://rodney-lambert.info/. Enter [id number] in the search box to learn more about \"Pregnancy Precautions: Care Instructions. \" Current as of: March 16, 2017 Content Version: 11.3 © 0615-7160 [employer]. Care instructions adapted under license by [employer] (which disclaims liability or warranty for this information). If you have questions about a medical condition or this instruction, always ask your healthcare professional. [place] [address] any warranty or liability for your use of this information.

## 2017-09-11 VITALS
SYSTOLIC BLOOD PRESSURE: 111 MMHG | DIASTOLIC BLOOD PRESSURE: 61 MMHG | HEART RATE: 79 BPM | HEIGHT: 70 IN | TEMPERATURE: 98.6 F | BODY MASS INDEX: 22.33 KG/M2 | WEIGHT: 156 LBS | RESPIRATION RATE: 16 BRPM

## 2017-09-11 PROCEDURE — 59025 FETAL NON-STRESS TEST: CPT

## 2017-09-11 PROCEDURE — 74011250637 HC RX REV CODE- 250/637: Performed by: OBSTETRICS & GYNECOLOGY

## 2017-09-11 PROCEDURE — 96372 THER/PROPH/DIAG INJ SC/IM: CPT

## 2017-09-11 PROCEDURE — 96361 HYDRATE IV INFUSION ADD-ON: CPT

## 2017-09-11 PROCEDURE — 96360 HYDRATION IV INFUSION INIT: CPT

## 2017-09-11 PROCEDURE — 74011250636 HC RX REV CODE- 250/636: Performed by: OBSTETRICS & GYNECOLOGY

## 2017-09-11 PROCEDURE — 99218 HC RM OBSERVATION: CPT

## 2017-09-11 PROCEDURE — 99285 EMERGENCY DEPT VISIT HI MDM: CPT

## 2017-09-11 PROCEDURE — 77030020255 HC SOL INJ LR 1000ML BG

## 2017-09-11 RX ADMIN — BETAMETHASONE SODIUM PHOSPHATE AND BETAMETHASONE ACETATE 12 MG: 3; 3 INJECTION, SUSPENSION INTRA-ARTICULAR; INTRALESIONAL; INTRAMUSCULAR at 13:20

## 2017-09-11 RX ADMIN — INDOMETHACIN 50 MG: 25 CAPSULE ORAL at 02:32

## 2017-09-11 NOTE — ROUTINE PROCESS
Bedside shift change report given to Veronica Babb RN (oncoming nurse) by Connie Aguirre RN (offgoing nurse). Report included the following information SBAR.

## 2017-09-11 NOTE — PROGRESS NOTES
Updated Dr. Marciano Zaragoza via telephone on patient disposition, including contraction pattern. 0730 Indocin to be held if patient continues to deny contractions.

## 2017-09-11 NOTE — PROGRESS NOTES
Medication administered at this time; please see MAR. Patient denies any contractions or lower abdominal pressure. Patient reports mild nausea with medication administration; denies any intervention at this time.

## 2017-09-11 NOTE — ROUTINE PROCESS
Bedside and Verbal shift change report given to Jeff Castellon (oncoming nurse) by [facility] RN (offgoing nurse). Report included the following information SBAR, Procedure Summary, Intake/Output, MAR and Recent Results.

## 2017-09-11 NOTE — DISCHARGE SUMMARY
Obstetrical Discharge Summary     Name: Sylvia Hawthorne MRN: [id number]  SSN: [id number]    YOB: 1980  Age: 43 y.o. Sex: female      Admit Date: 9/10/2017    Discharge Date: 2017     Admitting Physician: Heidi Ruiz MD     Attending Physician:  Traci Clifton MD     Admission Diagnoses: L&D Assessment  Current pregnancy with history of  labor, second trimester    Discharge Diagnoses:    contractions resolved    Additional Diagnoses:   Hospital Problems  Never Reviewed          Codes Class Noted POA    Current pregnancy with history of  labor ICD-10-CM: O09.219  ICD-9-CM: V23.41  9/10/2017 Unknown           No results found for: RUBELLAEXT, GRBSEXT    Immunization(s):   There is no immunization history on file for this patient. Labs:   Recent Results (from the past 24 hour(s))   POC URINE MACROSCOPIC    Collection Time: 09/10/17  1:06 PM   Result Value Ref Range    Color YELLOW      Appearance CLEAR      Spec. gravity (POC) 1.015 1.001 - 1.023      pH, urine  (POC) 7.0 5.0 - 9.0      Protein (POC) NEGATIVE  NEG mg/dL    Glucose, urine (POC) NEGATIVE  NEG mg/dL    Ketones (POC) NEGATIVE  NEG mg/dL    Bilirubin (POC) NEGATIVE  NEG      Blood (POC) Trace Intact (A) NEG      Urobilinogen (POC) 1.0 0.2 - 1.0 EU/dL    Nitrite (POC) NEGATIVE  NEG      Leukocyte esterase (POC) NEGATIVE  NEG      Performed by [facility]    CBC WITH AUTOMATED DIFF    Collection Time: 09/10/17  2:45 PM   Result Value Ref Range    WBC 7.4 4.6 - 13.2 K/uL    RBC 3.81 (L) 4.20 - 5.30 M/uL    HGB 11.3 (L) 12.0 - 16.0 g/dL    HCT 33.7 (L) 35.0 - 45.0 %    MCV 88.5 74.0 - 97.0 FL    MCH 29.7 24.0 - 34.0 PG    MCHC 33.5 31.0 - 37.0 g/dL    RDW 12.8 11.6 - 14.5 %    PLATELET 507 546 - 929 K/uL    MPV 11.3 9.2 - 11.8 FL    NEUTROPHILS 85 (H) 40 - 73 %    LYMPHOCYTES 10 (L) 21 - 52 %    MONOCYTES 4 3 - 10 %    EOSINOPHILS 1 0 - 5 %    BASOPHILS 0 0 - 2 %    ABS.  NEUTROPHILS 6.3 1.8 - 8.0 K/UL    ABS. LYMPHOCYTES 0.7 (L) 0.9 - 3.6 K/UL    ABS. MONOCYTES 0.3 0.05 - 1.2 K/UL    ABS. EOSINOPHILS 0.0 0.0 - 0.4 K/UL    ABS. BASOPHILS 0.0 0.0 - 0.06 K/UL    DF AUTOMATED         Exam:    Fundus firm and nontender  Bowel sounds are normal  Legs are normal without tenderness, swelling, or redness    Hospital Course: Patient was admitted to L&D at 26.4 weeks with contractions and positive FFN done at Dr Preston Ceballos office on Friday. Cervix closed but uterine contractions noted. Started on indocin and carafate and betamethasone series given. On HD 2 contractions resolved. Patient feeling well. Second dose of steroids given prior to discharge. Sent home on pelvic rest with PTL precautions. F/u Dr Dinorah Elmore 2 days    Patient Instructions: There are no discharge medications for this patient. Reference my discharge instructions. Follow-up Appointments   Procedures    FOLLOW UP VISIT Appointment in: Other (Specify) 2 days with Dr Dinorah Elmore     2 days with Dr Dinorah Elmore     Standing Status:   Standing     Number of Occurrences:   1     Order Specific Question:   Appointment in     Answer:    Other (Specify)        Signed By:  Ravin Ames MD     September 11, 2017

## 2017-09-11 NOTE — PROGRESS NOTES
~~ 0720 ASSUME CARE OF PT RESTING ON L SIDE, SR UP X2, CB IN REACH, NO SUPPORT PERSON HERE-  PT C/O HIP PAIN FR SIDE LYING-- TOLD OK TO REPOS-- PT UP TO BR TO VOID QS W/O DIFF--  EXTRA BLANKET PUT ONTO BED (TO SIT ON) TO TRY TO MAKE MORE COMF--    ~~0725 PT BACK TO BED, SITTING FOR COMF-  PT DENIES CRAMPING, CTX, PRESSURE, BACK PAIN OR HIP PAIN NOW. DENIES VAG BLEEDING OR SROM. +FM PER PT-  FHT HAS BEEN REACTIVE. PT C/O BEING HUNGRY-- WILL CHECK W/ MD ABOUT DIET    ~~ 0730 PT SERVED [facility]- DR KING IN TO SEE PT & OK'd REG DIET-- ORDERED    ~~ 0815 REG DIET BREAKFAST TRAY SERVED-    ~~ 0900 PT DID NOT LIKE FOOD SO CALLED DIETARY FOR REPLACEMENT--      ~~0930 PT EATING FRUIT FR REPLACED BREAKFAST TRAY--  BABY ACTIVE, FM AUDIBLE & TRACING BROKEN--  EFM & TOCO REPOS-     ~~1014 PT GETS SELF UP TO BR TO VOID QS W/O DIFF--  FRIENDS IN [place] TO VISIT THEN OUT    ~~1145 REG DIET LUNCH TRAY SERVED. NO NEEDS VOICED. PT OCCAS UP TO STRETCH IN THE ROOM AS BED IS NOT COMF--    ~~1257 EFM OFF FOR PT TO GO TO BR-- FHT HAS BEEN REASSURING.     ~~1310 HL REMOVED-     ~~1320 2nd BMTZ GIVEN IN L GLUT-- PT MARIANA WELL.  PT HAS CALLED HUSB TO COME PICK HER UP--  PER REQ PT GIVEN SHOWERING SUPPLIES TO GET CLEANED UP--    ~~1345 PT HAS SHOWERED- PT GIVEN WRITTEN & VERBAL D/C INSTRUCTIONS-- PT WAITING FOR HUSB- GOING TO 3418 TO VISIT NIECE  BEFORE LEAVING-- PT HAS APPT TOMORROW W/ DR Memo Chen

## 2017-09-12 LAB
BACTERIA SPEC CULT: NORMAL
SERVICE CMNT-IMP: NORMAL

## 2017-09-13 LAB
BACTERIA SPEC CULT: NEGATIVE
SERVICE CMNT-IMP: NORMAL

## 2017-09-29 ENCOUNTER — HOSPITAL ENCOUNTER (EMERGENCY)
Age: 37
Discharge: HOME | End: 2017-09-29
Attending: SPECIALIST | Admitting: OBSTETRICS & GYNECOLOGY
Payer: COMMERCIAL

## 2017-09-29 VITALS
SYSTOLIC BLOOD PRESSURE: 117 MMHG | DIASTOLIC BLOOD PRESSURE: 71 MMHG | RESPIRATION RATE: 18 BRPM | HEART RATE: 92 BPM | TEMPERATURE: 97.9 F

## 2017-09-29 LAB
APPEARANCE UR: CLEAR
BILIRUB UR QL: NEGATIVE
COLOR UR: YELLOW
GLUCOSE UR QL STRIP.AUTO: NEGATIVE MG/DL
KETONES UR-MCNC: NEGATIVE MG/DL
LEUKOCYTE ESTERASE UR QL STRIP: NEGATIVE
NITRITE UR QL: NEGATIVE
PH UR: 6.5 [PH] (ref 5–9)
PROT UR QL: NEGATIVE MG/DL
RBC # UR STRIP: ABNORMAL /UL
SERVICE CMNT-IMP: ABNORMAL
SP GR UR: 1.01 (ref 1–1.02)
UROBILINOGEN UR QL: 0.2 EU/DL (ref 0.2–1)

## 2017-09-29 PROCEDURE — 77030020255 HC SOL INJ LR 1000ML BG

## 2017-09-29 PROCEDURE — 81003 URINALYSIS AUTO W/O SCOPE: CPT

## 2017-09-29 PROCEDURE — 65270000029 HC RM PRIVATE

## 2017-09-29 PROCEDURE — 74011250636 HC RX REV CODE- 250/636: Performed by: OBSTETRICS & GYNECOLOGY

## 2017-09-29 PROCEDURE — 96360 HYDRATION IV INFUSION INIT: CPT

## 2017-09-29 PROCEDURE — 74011250637 HC RX REV CODE- 250/637: Performed by: OBSTETRICS & GYNECOLOGY

## 2017-09-29 PROCEDURE — 99285 EMERGENCY DEPT VISIT HI MDM: CPT

## 2017-09-29 RX ORDER — SODIUM CHLORIDE 9 MG/ML
125 INJECTION, SOLUTION INTRAVENOUS CONTINUOUS
Status: DISCONTINUED | OUTPATIENT
Start: 2017-09-29 | End: 2017-09-29 | Stop reason: HOSPADM

## 2017-09-29 RX ORDER — SUCRALFATE 1 G/1
1 TABLET ORAL
Status: DISCONTINUED | OUTPATIENT
Start: 2017-09-29 | End: 2017-09-29 | Stop reason: HOSPADM

## 2017-09-29 RX ORDER — INDOMETHACIN 25 MG/1
50 CAPSULE ORAL
Status: DISCONTINUED | OUTPATIENT
Start: 2017-09-29 | End: 2017-09-29 | Stop reason: HOSPADM

## 2017-09-29 RX ADMIN — INDOMETHACIN 50 MG: 25 CAPSULE ORAL at 13:33

## 2017-09-29 RX ADMIN — SODIUM CHLORIDE, SODIUM LACTATE, POTASSIUM CHLORIDE, AND CALCIUM CHLORIDE 1000 ML: 600; 310; 30; 20 INJECTION, SOLUTION INTRAVENOUS at 13:31

## 2017-09-29 RX ADMIN — SUCRALFATE 1 G: 1 TABLET ORAL at 13:28

## 2017-09-29 NOTE — PROGRESS NOTES
[facility] Cl[facility]. Pt states that she has taken stool softeners yesterday and today.

## 2017-09-29 NOTE — DISCHARGE INSTRUCTIONS
Discharge instructions reviewed with pt verbally and pt given written copy of instructions. NOTIFY YOUR DOCTOR/PROVIDER IF:    Signs and symptoms of labor-continuing tightening and relaxation of abdomen  Fever 100.4  Bleeding or leaking of fluid from the vagina  Persistent headache that does not go away with rest and tylenol  Severe abdominal pain    Fetal kick counts - 10 baby movements in 1 hour   Hydration- eight 8 oz glasses of water every day  Visual disturbances  Nausea and vomiting for more than 12 hours. Questions asked and answered.         Follow upas scheduled I office

## 2017-09-29 NOTE — PROGRESS NOTES
[address] [facility] [address] Bashir Monge paged and returned  Call  Aware of no CTX since received indocin . Orders received for discharge   reviewed discharge instructions, verbalizes understanding of self care.  Discharged to home

## 2017-09-29 NOTE — IP AVS SNAPSHOT
[address] Patient: Sophie Lee 
MRN: [id number] MCY:[id number] Current Discharge Medication List  
  
Notice You have not been prescribed any medications.

## 2017-09-29 NOTE — PROGRESS NOTES
Elsi Calles MD  [address]   [address]  [address]  [phone]     Labor and delivery screening visit    Name: Sarita Prader MRN: [id number]  SSN: [id number]    YOB: 1980  Age: 42 y.o. Sex: female        Denita Champagne is a 42 y.o. [address] female who is due 2017, by Other Basis. This makes her 29w1d. She  is being seen for   Chief Complaint   Patient presents with    Contractions   ,   OB History    Para Term  AB Living   7 4 1 3 2 4   SAB TAB Ectopic Molar Multiple Live Births    2    4      # Outcome Date GA Lbr Leon/2nd Weight Sex Delivery Anes PTL Lv   7 Current            6 TAB            5 TAB            4   36w0d    Vag-Spont   CHRISTINA   3   36w0d    Vag-Spont   CHRISTINA   2   30w0d    Vag-Spont   CHRISTINA   1 Term  40w0d    Vag-Spont   CHRISTINA          She is being seen in screening location 3420/01. Historical Additional  Problem List.:   Problem List as of 2017  Never Reviewed          Codes Class Noted - Resolved    Current pregnancy with history of  labor ICD-10-CM: O09.219  ICD-9-CM: V23.41  9/10/2017 - Present             No Known Allergies  No past medical history on file. Past Surgical History:   Procedure Laterality Date    BREAST SURGERY PROCEDURE UNLISTED      HX BREAST AUGMENTATION Bilateral 2013    HX OTHER SURGICAL  2017    tubal reversal    HX TUBAL LIGATION       No family history on file. Social History     Social History    Marital status:      Spouse name: N/A    Number of children: N/A    Years of education: N/A     Occupational History    Not on file.      Social History Main Topics    Smoking status: Never Smoker    Smokeless tobacco: Never Used    Alcohol use No    Drug use: No    Sexual activity: Yes     Partners: Male     Other Topics Concern    Not on file     Social History Narrative    No narrative on file     Prior to Admission medications    Not on File        Objective:  Visit Vitals    /71 (BP 1 Location: Left arm, BP Patient Position: At rest)    Pulse 92    Temp 97.9 °F (36.6 °C)    Resp 18       Prenatal Labs:   No results found for: RUBELLAEXT, GRBSEXT, HBSAGEXT, HIVEXT, RPREXT, GONNOEXT, CHLAMEXT      No results found for this or any previous visit (from the past 24 hour(s)). Fetal heart variability:moderate  Fetal Heart Rate decelerations: none  Fetal Heart Rate accelerations: yes  At least 10 beats elevation and two or more in twenty minutes  Baseline FHR:between 120-160beats per minute  Fetal Non-stress Test: Reactive  Assessment of NST:  Reactive and  No evidence of fetal comprimise         Estimated Date of Delivery: 17  OB History      Para Term  AB Living    7 4 1 3 2 4    SAB TAB Ectopic Molar Multiple Live Births     2    4              Physical Exam: Per myself or nursing assessment:  Patient without distress  HEENT: No obvious head trauma, she can hear at a conversational level, vision is adequate, and no choking or difficulty swallowing. Neurologically: She oriented to time and place as well as understands her situation and give a good medical history. Chest: clear to auscultation and percussion  Abdomen: benign with no obvious gallbladder or appendicits type pain, no CVA tenderness and the fundal size is consistent with her dates within four centimeters. Pelvic: External genitalia normal without inflammation, lesions or abnormal discharge  Extremities: with some swelling but not hyperreflexia  CervicalExam:0/ / /              Impression/Plan:     Plan:  Pt with hx KULDIP, had steroids, pos FFN, on progesterone vaginally, multiple contractions but cx closed so will begin Indocin at this time. .     Procedures done: screening visit of moderate complexity.                                       Signed By:  Joseph You MD     2017

## 2017-09-29 NOTE — IP AVS SNAPSHOT
[address] Patient: Edwina Morris 
MRN: [id number] BWE:6/58/4779 You are allergic to the following No active allergies Recent Documentation OB Status Smoking Status Pregnant Never Smoker Emergency Contacts Name Discharge Info Relation Home Work Mobile Supa Swanson DISCHARGE CAREGIVER [3] Spouse [3]   [phone] About your hospitalization You were admitted on:  September 29, 2017 You last received care in the:  [address] [place] [address] You were discharged on:  September 29, 2017 Unit phone number:  [phone] Why you were hospitalized Your primary diagnosis was:  Not on File Providers Seen During Your Hospitalizations Provider Role Specialty Primary office phone Lizz Collins MD Attending Provider Obstetrics & Gynecology [phone] Your Primary Care Physician (PCP) Primary Care Physician Office Phone Office Fax Laura Wei [phone] Follow-up Information None Current Discharge Medication List  
  
Notice You have not been prescribed any medications. Discharge Instructions Discharge instructions reviewed with pt verbally and pt given written copy of instructions. NOTIFY YOUR DOCTOR/PROVIDER IF: 
 
Signs and symptoms of labor-continuing tightening and relaxation of abdomen Fever 100.4 Bleeding or leaking of fluid from the vagina Persistent headache that does not go away with rest and tylenol Severe abdominal pain Fetal kick counts - 10 baby movements in 1 hour Hydration- eight 8 oz glasses of water every day Visual disturbances Nausea and vomiting for more than 12 hours. Questions asked and answered. Follow upas scheduled I office Discharge Instructions Attachments/References PREGNANCY: KICK COUNTS (ENGLISH) PREGNANCY: WEEKS 26 TO 30 (ENGLISH)  LABOR (ENGLISH) Discharge Orders None Introducing [facility]! Maribell Angeles introduces [employer] patient portal. Now you can access parts of your medical record, email your doctor's office, and request medication refills online. 1. In your internet browser, go to [email or web address] 2. Click on the First Time User? Click Here link in the Sign In box. You will see the New Member Sign Up page. 3. Enter your [employer] Access Code exactly as it appears below. You will not need to use this code after youve completed the sign-up process. If you do not sign up before the expiration date, you must request a new code. · [employer] Access Code: [email or web address] Expires: 2017  8:54 PM 
 
4. Enter the last four digits of your Social Security Number (xxxx) and Date of Birth (mm/dd/yyyy) as indicated and click Submit. You will be taken to the next sign-up page. 5. Create a [employer] Mobile ID. This will be your [employer] Mobile login ID and cannot be changed, so think of one that is secure and easy to remember. 6. Create a [employer] password. You can change your password at any time. 7. Enter your Password Reset Question and Answer. This can be used at a later time if you forget your password. 8. Enter your e-mail address. You will receive e-mail notification when new information is available in [address] 9. Click Sign Up. You can now view and download portions of your medical record. 10. Click the Download Summary menu link to download a portable copy of your medical information. If you have questions, please visit the Frequently Asked Questions section of the [employer] website. Remember, [employer] is NOT to be used for urgent needs. For medical emergencies, dial 911. Now available from your iPhone and Android! General Information Please provide this summary of care documentation to your next provider. Patient Signature:  ____________________________________________________________ Date:  ____________________________________________________________  
  
[facility] Provider Signature:  ____________________________________________________________ Date:  ____________________________________________________________ More Information Counting Your Baby's Kicks: Care Instructions Your Care Instructions Counting your baby's kicks is one way your doctor can tell that your baby is healthy. Most womenespecially in a first pregnancyfeel their baby move for the first time between 16 and 22 weeks. The movement may feel like flutters rather than kicks. Your baby may move more at certain times of the day. When you are active, you may notice less kicking than when you are resting. At your prenatal visits, your doctor will ask whether the baby is active. In your last trimester, your doctor may ask you to count the number of times you feel your baby move. Follow-up care is a key part of your treatment and safety. Be sure to make and go to all appointments, and call your doctor if you are having problems. It's also a good idea to know your test results and keep a list of the medicines you take. How do you count fetal kicks? · A common method of checking your baby's movement is to count the number of kicks or moves you feel in 1 hour. Ten movements (such as kicks, flutters, or rolls) in 1 hour are normal. Some doctors suggest that you count in the morning until you get to 10 movements. Then you can quit for that day and start again the next day. · Pick your baby's most active time of day to count. This may be any time from morning to evening. · If you do not feel 10 movements in an hour, your baby may be sleeping. Wait for the next hour and count again. When should you call for help? Call your doctor now or seek immediate medical care if: 
· You noticed that your baby has stopped moving or is moving much less than normal. 
Watch closely for changes in your health, and be sure to contact your doctor if you have any problems. Where can you learn more? Go to http://rodney-lambert.info/. Enter Q869 in the search box to learn more about \"Counting Your Baby's Kicks: Care Instructions. \" Current as of: 2017 Content Version: 11.3 © 5255-8097 [employer]. Care instructions adapted under license by [employer] (which disclaims liability or warranty for this information). If you have questions about a medical condition or this instruction, always ask your healthcare professional. [address] any warranty or liability for your use of this information. Weeks 26 to 30 of Your Pregnancy: Care Instructions Your Care Instructions You are now in your last trimester of pregnancy. Your baby is growing rapidly. And you'll probably feel your baby moving around more often. Your doctor may ask you to count your baby's kicks. Your back may ache as your body gets used to your baby's size and length. If you haven't already had the Tdap shot during this pregnancy, talk to your doctor about getting it. It will help protect your  against pertussis infection. During this time, it's important to take care of yourself and pay attention to what your body needs. If you feel sexual, explore ways to be close with your partner that match your comfort and desire. Use the tips provided in this care sheet to find ways to be sexual in your own way. Follow-up care is a key part of your treatment and safety. Be sure to make and go to all appointments, and call your doctor if you are having problems. It's also a good idea to know your test results and keep a list of the medicines you take. How can you care for yourself at home? Take it easy at work · Take frequent breaks. If possible, stop working when you are tired, and rest during your lunch hour. · Take bathroom breaks every 2 hours. · Change positions often. If you sit for long periods, stand up and walk around. · When you stand for a long time, keep one foot on a low stool with your knee bent. After standing a lot, sit with your feet up. · Avoid fumes, chemicals, and tobacco smoke. Be sexual in your own way · Having sex during pregnancy is okay, unless your doctor tells you not to. · You may be very interested in sex, or you may have no interest at all. · Your growing belly can make it hard to find a good position during intercourse. [place] and explore. · You may get cramps in your uterus when your partner touches your breasts. · A back rub may relieve the backache or cramps that sometimes follow orgasm. Learn about  labor · Watch for signs of  labor. You may be going into labor if: 
¨ You have menstrual-like cramps, with or without nausea. ¨ You have about 4 or more contractions in 20 minutes, or about 8 or more within 1 hour, even after you have had a glass of water and are resting. ¨ You have a low, dull backache that does not go away when you change your position. ¨ You have pain or pressure in your pelvis that comes and goes in a pattern. ¨ You have intestinal cramping or flu-like symptoms, with or without diarrhea. ¨ You notice an increase or change in your vaginal discharge. Discharge may be heavy, mucus-like, watery, or streaked with blood. ¨ Your water breaks. · If you think you have  labor: ¨ Drink 2 or 3 glasses of water or juice. Not drinking enough fluids can cause contractions. ¨ Stop what you are doing, and empty your bladder. Then lie down on your left side for at least 1 hour. ¨ While lying on your side, find your breast bone.  Put your fingers in the soft spot just below it. Move your fingers down toward your belly button to find the top of your uterus. Check to see if it is tight. ¨ Contractions can be weak or strong. Record your contractions for an hour. Time a contraction from the start of one contraction to the start of the next one. ¨ Single or several strong contractions without a pattern are called Francisco-Hidalgo contractions. They are practice contractions but not the start of labor. They often stop if you change what you are doing. ¨ Call your doctor if you have regular contractions. Where can you learn more? Go to http://rodney-lambert.info/. Enter U457 in the search box to learn more about \"Weeks 26 to 30 of Your Pregnancy: Care Instructions. \" Current as of: 2017 Content Version: 11.3 © 7409-9719 [employer]. Care instructions adapted under license by "[employer]" (which disclaims liability or warranty for this information). If you have questions about a medical condition or this instruction, always ask your healthcare professional. [place] [address] any warranty or liability for your use of this information.  Labor: Care Instructions Your Care Instructions  labor is the start of labor between 20 and 36 weeks of pregnancy. A full-term pregnancy lasts 37 to 42 weeks. In labor, the uterus contracts to open the cervix. This is the first stage of childbirth.  labor can be caused by a problem with the baby, the mother, or both. Often the cause is not known. In some cases, doctors use medicines to try to delay labor until 29 or more weeks of pregnancy. By this time, a baby has grown enough so that problems are not likely. In some casessuch as with a serious infectionit is healthier for the baby to be born early. Your treatment will depend on how far along you are in your pregnancy and on your health and your baby's health. Follow-up care is a key part of your treatment and safety. Be sure to make and go to all appointments, and call your doctor if you are having problems. It's also a good idea to know your test results and keep a list of the medicines you take. How can you care for yourself at home? · If your doctor prescribed medicines, take them exactly as directed. Call your doctor if you think you are having a problem with your medicine. · Rest until your doctor advises you about activity. He or she will tell you if you should stay in bed most of the time. You may need to arrange for  if you have young children. · Do not have sexual intercourse unless your doctor says it is safe. · Use pads, not tampons, if you have vaginal bleeding. · Make sure to drink plenty of fluids. Dehydration can lead to contractions. If you have kidney, heart, or liver disease and have to limit fluids, talk with your doctor before you increase the amount of fluids you drink. · Do not smoke or allow others to smoke around you. If you need help quitting, talk to your doctor about stop-smoking programs and medicines. These can increase your chances of quitting for good. When should you call for help? Call 911 anytime you think you may need emergency care. For example, call if: 
· You passed out (lost consciousness). · You have severe vaginal bleeding. · You have severe pain in your belly or pelvis. · You have had fluid gushing or leaking from your vagina and you know or think the umbilical cord is bulging into your vagina. If this happens, immediately get down on your knees so your rear end (buttocks) is higher than your head. This will decrease the pressure on the cord until help arrives. Call your doctor now or seek immediate medical care if: 
· You have signs of preeclampsia, such as: 
¨ Sudden swelling of your face, hands, or feet. ¨ New vision problems (such as dimness or blurring). ¨ A severe headache. · You have any vaginal bleeding. · You have belly pain or cramping. · You have a fever. · You have had regular contractions (with or without pain) for an hour. This means that you have 6 or more within 1 hour after you change your position and drink fluids. · You have a sudden release of fluid from the vagina. · You have low back pain or pelvic pressure that does not go away. · You notice that your baby has stopped moving or is moving much less than normal. 
Watch closely for changes in your health, and be sure to contact your doctor if you have any problems. Where can you learn more? Go to http://rodney-lambert.info/. Enter Q400 in the search box to learn more about \" Labor: Care Instructions. \" Current as of: 2017 Content Version: 11.3 © 2176-3854 [employer]. Care instructions adapted under license by [employer] (which disclaims liability or warranty for this information). If you have questions about a medical condition or this instruction, always ask your healthcare professional. [address] any warranty or liability for your use of this information.

## 2017-10-13 ENCOUNTER — HOSPITAL ENCOUNTER (EMERGENCY)
Age: 37
Discharge: HOME | End: 2017-10-13
Attending: SPECIALIST | Admitting: SPECIALIST
Payer: COMMERCIAL

## 2017-10-13 VITALS
WEIGHT: 155 LBS | SYSTOLIC BLOOD PRESSURE: 119 MMHG | DIASTOLIC BLOOD PRESSURE: 66 MMHG | BODY MASS INDEX: 21.7 KG/M2 | HEART RATE: 77 BPM | HEIGHT: 71 IN

## 2017-10-13 LAB
APPEARANCE UR: CLEAR
BILIRUB UR QL: NEGATIVE
COLOR UR: YELLOW
FIBRONECTIN FETAL VAG QL: NEGATIVE
GLUCOSE UR QL STRIP.AUTO: NEGATIVE MG/DL
KETONES UR-MCNC: NEGATIVE MG/DL
LEUKOCYTE ESTERASE UR QL STRIP: NEGATIVE
NITRITE UR QL: NEGATIVE
PH UR: 7 [PH] (ref 5–9)
PROT UR QL: NEGATIVE MG/DL
RBC # UR STRIP: ABNORMAL /UL
SERVICE CMNT-IMP: ABNORMAL
SP GR UR: 1.02 (ref 1–1.02)
UROBILINOGEN UR QL: 0.2 EU/DL (ref 0.2–1)

## 2017-10-13 PROCEDURE — 65270000029 HC RM PRIVATE

## 2017-10-13 PROCEDURE — 82731 ASSAY OF FETAL FIBRONECTIN: CPT | Performed by: SPECIALIST

## 2017-10-13 PROCEDURE — 81003 URINALYSIS AUTO W/O SCOPE: CPT

## 2017-10-13 PROCEDURE — 59025 FETAL NON-STRESS TEST: CPT

## 2017-10-13 RX ORDER — PROGESTERONE 200 MG/1
200 CAPSULE ORAL DAILY
COMMUNITY
End: 2017-11-20

## 2017-10-13 NOTE — PROGRESS NOTES
Dr Andree Mckenzie paged , and returned call  Aware of patient arrival and c/o CTX since last evening orders received.

## 2017-10-13 NOTE — H&P
History & Physical  Hospital visit Moderate Severity 10/13/2017  IDENTIFYING DATA  Patient's Name:  Maggie Kennedy. MRN: [id number]. : 1980. Date of Service:  10/13/2017  10:41 AM  Physician:  Pascale Noe MD    IMPRESSION:    Indication:   Maggie Kennedy is a  42 y.o. pregnant patient at 31w1d weeks. Estimated Date of Delivery: 17   Prior  labor. On Progesterone vaginal suppositories 200mg qhs   Threatened PTL with negative fibronectin today and Cx FT, 70%, -2   Compliant patient tay q12 minutes. Already received BTMZ 4 wks ago. RECOMMENDATIONS:    Home to rest, hydration, warnings. F/u with worsening symptoms. Subjective:   Chief complaint:    Chief Complaint   Patient presents with    Contractions     OB History      Para Term  AB Living    7 4 1 3 2 4    SAB TAB Ectopic Molar Multiple Live Births     2    4          OB HISTORY:    OB History      Para Term  AB Living    7 4 1 3 2 4    SAB TAB Ectopic Molar Multiple Live Births     2    4          PAST MEDICAL HISTORY: History reviewed. No pertinent past medical history. PAST SURGICAL HISTORY:   Past Surgical History:   Procedure Laterality Date    BREAST SURGERY PROCEDURE UNLISTED      HX BREAST AUGMENTATION Bilateral     HX OTHER SURGICAL  2017    tubal reversal    HX TUBAL LIGATION         SOCIAL HISTORY:    Social History     Social History    Marital status:      Spouse name: N/A    Number of children: N/A    Years of education: N/A     Occupational History    Not on file. Social History Main Topics    Smoking status: Never Smoker    Smokeless tobacco: Never Used    Alcohol use No    Drug use: No    Sexual activity: Yes     Partners: Male     Other Topics Concern    Not on file     Social History Narrative       FAMILY HISTORY  History reviewed. No pertinent family history.     ALLERGY:  No Known Allergies    HOME MEDICATIONS: Prior to Admission medications    Medication Sig Start Date End Date Taking? Authorizing Provider   progesterone (PROMETRIUM) 200 mg capsule Insert 200 mg into rectum daily. Yes Historical Provider          CERVICAL EXAM: (data input under \"more activities\" and \"flowsheets\" at the top.)  Cervical Exam  Dilation (cm): 0 (to fingertip)  Eff: 60 %  Station: Floating  Vaginal exam done by? : Dr Lala Cardoso:  Baseline FHR: Patient Vitals for the past 2 hrs: Mode Fetal Heart Rate Fetal Activity Variability Decelerations Accelerations   10/13/17 1019 External 145 Present 6-25 BPM None Yes   10/13/17 0930 External 145 Present 6-25 BPM None Yes       REVIEW OF SYMPTOMS:   Constitutional: fever, chills denied. Head, Ears, Nose Throat:   Ear pain denied. Sore throat denied. Eyes: Pain denied. Visual disturbance denied. Respiratory: Cough denied. Shortness of breath denied. Cardiovascular: Chest pains denied. Gastrointestinal: Nausea, Vomiting, Diarrhea, Constipation denied. Genitourinary: Vaginal Bleeding, Vaginal Odor, Painful Urination, Blood in Urine denied. Pain over kidneys denied. Musculoskeletal: Back pain, Joint pain denied. Skin:  Rash denied. Injuries denied. Neurological:  Headaches, Dizziness, Seizures denied. Psychiatric/Behavioral: Major mood problems denied. Objective:     Physical Exam:     Visit Vitals    /66    Pulse 77    Ht 5' 10.5\" (1.791 m)    Wt 70.3 kg (155 lb)    BMI 21.93 kg/m2     General appearance:   Alert, oriented to person, place, and time, cooperative, no distress, appears stated age and is well-developed and well-nourished. Head, Nose, Throat: Normocephalic, atraumatic. Eyes: Conjunctivae appear normal.  Pupils are equal and round. Neck: Normal range of motion. Supple. Heart:  Regular rate and Rhythm. Lungs: Effort normal, No apparent respiratory distress, Wheezes or Cough. Abdomen: Term pregnancy appropriate size.  Abnormal tenderness not detected. Scars None detected. External genitalia: normal appearance without obvious lesions. Bartholin's,  [place]'s, Urethra: Normal.   Vaginal Vault:  Discharge or Odor not detected. Blood not detected. Foreign body or Injury not detected. Uterus:  Appropriate size for gestational age 29 wks. Musculoskeletal: Normal range of motion. Neurological: Loss of strength or sensation not detected. Disorientation to time, person, place not detected. Skin: Lesions not detected. Rashes not detected. Extremities:  Trauma,  Swelling or edema not detected. Psychiatric: Abnormal mood or affect not detected.      Signed By:    Ilsa Branham MD  October 13, 2017 10:41 AM

## 2017-10-13 NOTE — IP AVS SNAPSHOT
[address] Patient: Sophie Lee 
MRN: [id number] VMA:7[birth year]04[birth year]471 Current Discharge Medication List  
  
ASK your doctor about these medications Dose & Instructions Dispensing Information Comments Morning Noon Evening Bedtime  
 progesterone 200 mg capsule Commonly known as:  PROMETRIUM Your last dose was: Your next dose is:    
   
   
 Dose:  200 mg Insert 200 mg into rectum daily. Refills:  0

## 2017-10-13 NOTE — IP AVS SNAPSHOT
Pattie Monson 
 
 
 [address] [place] [address] Patient: Lary Vidal 
MRN: [id number] You are allergic to the following No active allergies Recent Documentation Height Weight BMI OB Status Smoking Status 1.791 m 70.3 kg 21.93 kg/m2 Pregnant Never Smoker Emergency Contacts Name Discharge Info Relation Home Work Mobile Supa Swanson DISCHARGE CAREGIVER [3] Spouse [3]   [phone] About your hospitalization You were admitted on:  October 13, 2017 You last received care in the:  [address] [facility] [address] You were discharged on:  October 13, 2017 Unit phone number:  [phone] Why you were hospitalized Your primary diagnosis was:  Not on File Providers Seen During Your Hospitalizations Provider Role Specialty Primary office phone Mj Millan MD Attending Provider Obstetrics & Gynecology [phone] Your Primary Care Physician (PCP) Primary Care Physician Office Phone Office Fax Jake Jose [phone] Follow-up Information None Current Discharge Medication List  
  
ASK your doctor about these medications Dose & Instructions Dispensing Information Comments Morning Noon Evening Bedtime  
 progesterone 200 mg capsule Commonly known as:  PROMETRIUM Your last dose was: Your next dose is:    
   
   
 Dose:  200 mg Insert 200 mg into rectum daily. Refills:  0 Discharge Instructions Discharge instructions reviewed with pt verbally and pt given written copy of instructions. NOTIFY YOUR DOCTOR/PROVIDER IF: 
 
Signs and symptoms of labor-continuing tightening and relaxation of abdomen Fever 100.4 Bleeding or leaking of fluid from the vagina Persistent headache that does not go away with rest and tylenol Severe abdominal pain Fetal kick counts - 10 baby movements in 1 hour Hydration- eight 8 oz glasses of water every day Visual disturbances Nausea and vomiting for more than 12 hours. Questions asked and answered. Follow up as scheduled in office Discharge Instructions Attachments/References PREGNANCY: KICK COUNTS (ENGLISH) PREGNANCY: PRECAUTIONS (ENGLISH) PREGNANCY: WEEKS 32 TO 34 (ENGLISH) Discharge Orders None Introducing [facility]! [address] introduces [facility] patient portal. Now you can access parts of your medical record, email your doctor's office, and request medication refills online. 1. In your internet browser, go to [email or web address] 2. Click on the First Time User? Click Here link in the Sign In box. You will see the New Member Sign Up page. 3. Enter your [facility] Access Code exactly as it appears below. You will not need to use this code after youve completed the sign-up process. If you do not sign up before the expiration date, you must request a new code. · [facility] Access Code: [email or web address] Expires: 11/18/2017  8:54 PM 
 
4. Enter the last four digits of your Social Security Number (xxxx) and Date of Birth (mm/dd/yyyy) as indicated and click Submit. You will be taken to the next sign-up page. 5. Create a [facility] ID. This will be your [facility] login ID and cannot be changed, so think of one that is secure and easy to remember. 6. Create a [facility] password. You can change your password at any time. 7. Enter your Password Reset Question and Answer. This can be used at a later time if you forget your password. 8. Enter your e-mail address. You will receive e-mail notification when new information is available in [address] 9. Click Sign Up. You can now view and download portions of your medical record. 10. Click the Download Summary menu link to download a portable copy of your medical information. If you have questions, please visit the Frequently Asked Questions section of the [employer] website. Remember, [employer] is NOT to be used for urgent needs. For medical emergencies, dial 911. Now available from your iPhone and Android! General Information Please provide this summary of care documentation to your next provider. Patient Signature:  ____________________________________________________________ Date:  ____________________________________________________________  
  
Randy Neil Provider Signature:  ____________________________________________________________ Date:  ____________________________________________________________ More Information Counting Your Baby's Kicks: Care Instructions Your Care Instructions Counting your baby's kicks is one way your doctor can tell that your baby is healthy. Most womenespecially in a first pregnancyfeel their baby move for the first time between 16 and 22 weeks. The movement may feel like flutters rather than kicks. Your baby may move more at certain times of the day. When you are active, you may notice less kicking than when you are resting. At your prenatal visits, your doctor will ask whether the baby is active. In your last trimester, your doctor may ask you to count the number of times you feel your baby move. Follow-up care is a key part of your treatment and safety. Be sure to make and go to all appointments, and call your doctor if you are having problems. It's also a good idea to know your test results and keep a list of the medicines you take. How do you count fetal kicks? · A common method of checking your baby's movement is to count the number of kicks or moves you feel in 1 hour. Ten movements (such as kicks, flutters, or rolls) in 1 hour are normal. Some doctors suggest that you count in the morning until you get to 10 movements.  Then you can quit for that day and start again the next day. · Pick your baby's most active time of day to count. This may be any time from morning to evening. · If you do not feel 10 movements in an hour, your baby may be sleeping. Wait for the next hour and count again. When should you call for help? Call your doctor now or seek immediate medical care if: 
· You noticed that your baby has stopped moving or is moving much less than normal. 
Watch closely for changes in your health, and be sure to contact your doctor if you have any problems. Where can you learn more? Go to http://rodney-lambert.info/. Enter G056 in the search box to learn more about \"Counting Your Baby's Kicks: Care Instructions. \" Current as of: 2017 Content Version: 11.3 © 0847-6469 Typeform. Care instructions adapted under license by [employer] (which disclaims liability or warranty for this information). If you have questions about a medical condition or this instruction, always ask your healthcare professional. [place] [address] any warranty or liability for your use of this information. Pregnancy Precautions: Care Instructions Your Care Instructions There is no sure way to prevent labor before your due date ( labor) or to prevent most other pregnancy problems. But there are things you can do to increase your chances of a healthy pregnancy. Go to your appointments, follow your doctor's advice, and take good care of yourself. Eat well, and exercise (if your doctor agrees). And make sure to drink plenty of water. Follow-up care is a key part of your treatment and safety. Be sure to make and go to all appointments, and call your doctor if you are having problems. It's also a good idea to know your test results and keep a list of the medicines you take. How can you care for yourself at home? · Make sure you go to your prenatal appointments.  At each visit, your doctor will check your blood pressure. Your doctor will also check to see if you have protein in your urine. High blood pressure and protein in urine are signs of preeclampsia. This condition can be dangerous for you and your baby. · Drink plenty of fluids, enough so that your urine is light yellow or clear like water. Dehydration can cause contractions. If you have kidney, heart, or liver disease and have to limit fluids, talk with your doctor before you increase the amount of fluids you drink. · Tell your doctor right away if you notice any symptoms of an infection, such as: ¨ Burning when you urinate. ¨ A foul-smelling discharge from your vagina. ¨ Vaginal itching. ¨ Unexplained fever. ¨ Unusual pain or soreness in your uterus or lower belly. · Eat a balanced diet. Include plenty of foods that are high in calcium and iron. ¨ Foods high in calcium include milk, cheese, yogurt, almonds, and broccoli. ¨ Foods high in iron include red meat, shellfish, poultry, eggs, beans, raisins, whole-grain bread, and leafy green vegetables. · Do not smoke. If you need help quitting, talk to your doctor about stop-smoking programs and medicines. These can increase your chances of quitting for good. · Do not drink alcohol or use illegal drugs. · Follow your doctor's directions about activity. Your doctor will let you know how much, if any, exercise you can do. · Ask your doctor if you can have sex. If you are at risk for early labor, your doctor may ask you to not have sex. · Take care to prevent falls. During pregnancy, your joints are loose, and your balance is off. Sports such as bicycling, skiing, or in-line skating can increase your risk of falling. And don't ride horses or motorcycles, dive, water ski, scuba dive, or parachute jump while you are pregnant. · Avoid getting very hot. Do not use saunas or hot tubs. Avoid staying out in the sun in hot weather for long periods.  Take acetaminophen (Tylenol) to lower a high fever. · Do not take any over-the-counter or herbal medicines or supplements without talking to your doctor or pharmacist first. 
When should you call for help? Call 911 anytime you think you may need emergency care. For example, call if: 
· You passed out (lost consciousness). · You have severe vaginal bleeding. · You have severe pain in your belly or pelvis. · You have had fluid gushing or leaking from your vagina and you know or think the umbilical cord is bulging into your vagina. If this happens, immediately get down on your knees so your rear end (buttocks) is higher than your head. This will decrease the pressure on the cord until help arrives. Call your doctor now or seek immediate medical care if: 
· You have signs of preeclampsia, such as: 
¨ Sudden swelling of your face, hands, or feet. ¨ New vision problems (such as dimness or blurring). ¨ A severe headache. · You have any vaginal bleeding. · You have belly pain or cramping. · You have a fever. · You have had regular contractions (with or without pain) for an hour. This means that you have 8 or more within 1 hour or 4 or more in 20 minutes after you change your position and drink fluids. · You have a sudden release of fluid from your vagina. · You have low back pain or pelvic pressure that does not go away. · You notice that your baby has stopped moving or is moving much less than normal. 
Watch closely for changes in your health, and be sure to contact your doctor if you have any problems. Where can you learn more? Go to http://rodney-lambert.info/. Enter [id number] in the search box to learn more about \"Pregnancy Precautions: Care Instructions. \" Current as of: March 16, 2017 Content Version: 11.3 © 2322-3275 [employer].  Care instructions adapted under license by [employer] (which disclaims liability or warranty for this information). If you have questions about a medical condition or this instruction, always ask your healthcare professional. [place] [address] any warranty or liability for your use of this information. Weeks 32 to 34 of Your Pregnancy: Care Instructions Your Care Instructions During the last few weeks of your pregnancy, you may have more aches and pains. It's important to rest when you can. Your growing baby is putting more pressure on your bladder. So you may need to urinate more often. Hemorrhoids are also common. These are painful, itchy veins in the rectal area. In the 36th week, most women have a test for group B streptococcus (GBS). GBS is a common bacteria that can live in the vagina and rectum. It can make your baby sick after birth. If you test positive, you will get antibiotics during labor. These will keep your baby from getting the bacteria. You may want to talk with your doctor about banking your baby's umbilical cord blood. This is the blood left in the cord after birth. If you want to save this blood, you must arrange it ahead of time. You can't decide at the last minute. If you haven't already had the Tdap shot during this pregnancy, talk to your doctor about getting it. It will help protect your  against pertussis infection. Follow-up care is a key part of your treatment and safety. Be sure to make and go to all appointments, and call your doctor if you are having problems. It's also a good idea to know your test results and keep a list of the medicines you take. How can you care for yourself at home? Ease hemorrhoids · Get more liquids, fruits, vegetables, and fiber in your diet. This will help keep your stools soft. · Avoid sitting for too long. Lie on your left side several times a day. · Clean yourself with soft, moist toilet paper. Or you can use witch hazel pads or personal hygiene pads. · If you are uncomfortable, try ice packs. Or you can sit in a warm sitz bath. Do these for 20 minutes at a time, as needed. · Use hydrocortisone cream for pain and itching. Two examples are Anusol and Preparation H Hydrocortisone. · Ask your doctor about taking an over-the-counter stool softener. Consider breastfeeding · Experts recommend that women breastfeed for 1 year or longer. Breast milk is the perfect food for babies. · Breast milk is easier for babies to digest than formula. And it is always available, just the right temperature, and free. · In general, babies who are  are healthier than formula-fed babies. ¨  babies are less likely to get ear infections, colds, diarrhea, and pneumonia. ¨  babies who are fed only breast milk are less likely to get asthma and allergies. ¨  babies are less likely to be obese. ¨  babies are less likely to get diabetes or heart disease. · Women who breastfeed have less bleeding after the birth. Their uteruses also shrink back faster. · Some women who breastfeed lose weight faster. Making milk burns calories. · Breastfeeding can lower your risk of breast cancer, ovarian cancer, and osteoporosis. Decide about circumcision for boys · As you make this decision, it may help to think about your personal, Church, and family traditions. You get to decide if you will keep your son's penis natural or if he will be circumcised. · If you decide that you would like to have your baby circumcised, talk with your doctor. You can share your concerns about pain. And you can discuss your preferences for anesthesia. Where can you learn more? Go to http://rodney-lambert.info/. Enter E304 in the search box to learn more about \"Weeks 32 to 34 of Your Pregnancy: Care Instructions. \" Current as of: March 16, 2017 Content Version: 11.3 © 5347-4654 [employer].  Care instructions adapted under license by [address] (which disclaims liability or warranty for this information). If you have questions about a medical condition or this instruction, always ask your healthcare professional. [address] any warranty or liability for your use of this information.

## 2017-10-13 NOTE — PROGRESS NOTES
3046  Patient arrived to L&D with c/o contractions since last night around 1800 and tay every 10 mins per patient. Patient placed on EFM and triage assessment completed.      0710 Report given to oncoming nurse Albertina Du RN

## 2017-10-13 NOTE — PROGRESS NOTES
Reviewed discharge instructions. Verbalizes understanding of self care. Denies questions, problems or c/o.    Discharged to home

## 2017-10-27 LAB — GRBS, EXTERNAL: NEGATIVE

## 2017-10-31 ENCOUNTER — HOSPITAL ENCOUNTER (EMERGENCY)
Age: 37
Discharge: HOME | End: 2017-10-31
Attending: OBSTETRICS & GYNECOLOGY | Admitting: OBSTETRICS & GYNECOLOGY
Payer: COMMERCIAL

## 2017-10-31 VITALS — SYSTOLIC BLOOD PRESSURE: 124 MMHG | DIASTOLIC BLOOD PRESSURE: 69 MMHG | HEART RATE: 81 BPM | TEMPERATURE: 98.2 F

## 2017-10-31 PROCEDURE — 59025 FETAL NON-STRESS TEST: CPT

## 2017-10-31 NOTE — PROGRESS NOTES
Received ambulatory to room 3418 with c/o CTX, Denies vaginal leakage or   Bleeding . C/o increased pain with CTX this AM. Admits to fetal movement EFM applied. Dr Jose Hall paged and returned call. Orders received.

## 2017-10-31 NOTE — DISCHARGE INSTRUCTIONS
Discharge instructions reviewed with pt verbally and pt given written copy of instructions. NOTIFY YOUR DOCTOR/PROVIDER IF:    Signs and symptoms of labor-continuing tightening and relaxation of abdomen  Fever 100.4  Bleeding or leaking of fluid from the vagina  Persistent headache that does not go away with rest and tylenol  Severe abdominal pain    Fetal kick counts - 10 baby movements in 1 hour   Hydration- eight 8 oz glasses of water every day  Visual disturbances  Nausea and vomiting for more than 12 hours. Questions asked and answered.         Follow up  As scheduled in office

## 2017-10-31 NOTE — DISCHARGE SUMMARY
Jd Cardenas MD  [address]   [address]  [address] [facility] [address]  [phone]     Labor and delivery screening visit    Name: Yany Jordan MRN: [id number]  SSN: [id number]    YOB: 1980  Age: 42 y.o. Sex: female        Fannie Bermudez is a 42 y.o. [address] female who is due 2017, by Other Basis. This makes her 33w5d. She  is being seen for Chief Complaint   Patient presents with    Contractions   , OB History    Para Term  AB Living   7 4 1 3 2 4   SAB TAB Ectopic Molar Multiple Live Births    2    4      # Outcome Date GA Lbr Leon/2nd Weight Sex Delivery Anes PTL Lv   7 Current            6 TAB            5 TAB            4   36w0d    Vag-Spont   CHRISTINA   3   36w0d    Vag-Spont   CHRISTINA   2   30w0d    Vag-Spont   CHRISTINA   1 Term  40w0d    Vag-Spont   CHRISTINA          She is being seen in screening location [facility] Additional Diagnoses:Present on Admission:  **None**       Historical Additional  Problem List.: Problem List as of 10/31/2017  Never Reviewed          Codes Class Noted - Resolved    Current pregnancy with history of  labor ICD-10-CM: O09.219  ICD-9-CM: V23.41  9/10/2017 - Present             No Known Allergies  No past medical history on file. Past Surgical History:   Procedure Laterality Date    BREAST SURGERY PROCEDURE UNLISTED      HX BREAST AUGMENTATION Bilateral     HX OTHER SURGICAL  2017    tubal reversal    HX TUBAL LIGATION       No family history on file. Social History     Social History    Marital status:      Spouse name: N/A    Number of children: N/A    Years of education: N/A     Occupational History    Not on file.      Social History Main Topics    Smoking status: Never Smoker    Smokeless tobacco: Never Used    Alcohol use No    Drug use: No    Sexual activity: Yes     Partners: Male     Other Topics Concern    Not on file     Social History Narrative     Prior to Admission medications    Medication Sig Start Date End Date Taking? Authorizing Provider   progesterone (PROMETRIUM) 200 mg capsule Insert 200 mg into rectum daily. Yes Historical Provider        Objective:  Visit Vitals    /69    Pulse 81    Temp 98.2 °F (36.8 °C)       Prenatal Labs:   No results found for: RUBELLAEXT, GRBSEXT, HBSAGEXT, HIVEXT, RPREXT, GONNOEXT, CHLAMEXT      No results found for this or any previous visit (from the past 24 hour(s)). Fetal Heart Rate:   Baseline FHR: Patient Vitals for the past 2 hrs: Mode Fetal Heart Rate Fetal Activity Variability Decelerations Accelerations RN Reviewed Strip? Provider who reviewed strip? Non Stress Test   10/31/17 1720 External 145 Present 6-25 BPM None Yes Yes - Reactive   10/31/17 1634 External 140 Present 6-25 BPM None Yes Yes dr Murphy Rather  Reactive        Fetal heart variability:moderate  Fetal Heart Rate decelerations: none  Fetal Heart Rate accelerations: yes  At least 10 beats elevation and two or more in twenty minutes  Baseline FHR:between 120-160beats per minute  Fetal Non-stress Test: Reactive  Assessment of NST:  Reactive and  No evidence of fetal comprimise         Estimated Date of Delivery: 17  OB History      Para Term  AB Living    7 4 1 3 2 4    SAB TAB Ectopic Molar Multiple Live Births     2    4              Physical Exam: Per myself or nursing assessment:  Patient without distress  HEENT: No obvious head trauma, she can hear at a conversational level, vision is adequate, and no choking or difficulty swallowing. Neurologically: She oriented to time and place as well as understands her situation and give a good medical history. Chest: clear to auscultation and percussion  Abdomen: benign with no obvious gallbladder or appendicits type pain, no CVA tenderness and the fundal size is consistent with her dates within four centimeters.    Pelvic: External genitalia normal without inflammation, lesions or abnormal discharge  Extremities: with some swelling but not hyperreflexia  CervicalExam:0/60 %/Floating/              Impression/Plan:     Plan:  No change in cevix from last exam so will DC  And she is well aware of instructions to call. Procedures done: screening visit of moderate complexity.                                      Non-stress test, 97883-83                                                              Signed By:  Will Jefferson MD     October 31, 2017

## 2017-10-31 NOTE — IP AVS SNAPSHOT
[address] Patient: Maggie Kennedy 
MRN: [id number] HXW:4/46/7004 About your hospitalization You were admitted on:  October 31, 2017 You last received care in the:  [address] [place] [address] You were discharged on:  October 31, 2017 Why you were hospitalized Your primary diagnosis was:  Not on File Things You Need To Do (next 8 weeks) Follow up with Mendez Cole MD  
  
Phone:  [phone] Where:  [address] [id number] Discharge Orders None A check ajay indicates which time of day the medication should be taken. My Medications TAKE these medications as instructed Instructions Each Dose to Equal  
 Morning Noon Evening Bedtime  
 progesterone 200 mg capsule Commonly known as:  PROMETRIUM Your last dose was: Your next dose is: Insert 200 mg into rectum daily. 200 mg Discharge Instructions Discharge instructions reviewed with pt verbally and pt given written copy of instructions. NOTIFY YOUR DOCTOR/PROVIDER IF: 
 
Signs and symptoms of labor-continuing tightening and relaxation of abdomen Fever 100.4 Bleeding or leaking of fluid from the vagina Persistent headache that does not go away with rest and tylenol Severe abdominal pain Fetal kick counts - 10 baby movements in 1 hour Hydration- eight 8 oz glasses of water every day Visual disturbances Nausea and vomiting for more than 12 hours. Questions asked and answered. Follow up  As scheduled in office MyChart Announcement We are excited to announce that we are making your provider's discharge notes available to you in MyChart.   You will see these notes when they are completed and signed by the physician that discharged you from your recent hospital stay. If you have any questions or concerns about any information you see in [facility], please call the Health Information Department where you were seen or reach out to your Primary Care Provider for more information about your plan of care. Introducing [facility]! Jaiden Fournier introduces [facility] patient portal. Now you can access parts of your medical record, email your doctor's office, and request medication refills online. 1. In your internet browser, go to [email or web address] 2. Click on the First Time User? Click Here link in the Sign In box. You will see the New Member Sign Up page. 3. Enter your [place] Access Code exactly as it appears below. You will not need to use this code after youve completed the sign-up process. If you do not sign up before the expiration date, you must request a new code. · [place] Access Code: [email or web address] Expires: 11/18/2017  8:54 PM 
 
4. Enter the last four digits of your Social Security Number (xxxx) and Date of Birth (mm/dd/yyyy) as indicated and click Submit. You will be taken to the next sign-up page. 5. Create a [facility] ID. This will be your [facility] login ID and cannot be changed, so think of one that is secure and easy to remember. 6. Create a [place] password. You can change your password at any time. 7. Enter your Password Reset Question and Answer. This can be used at a later time if you forget your password. 8. Enter your e-mail address. You will receive e-mail notification when new information is available in [address] 9. Click Sign Up. You can now view and download portions of your medical record. 10. Click the Download Summary menu link to download a portable copy of your medical information. If you have questions, please visit the Frequently Asked Questions section of the [facility] website. Remember, [facility] is NOT to be used for urgent needs. For medical emergencies, dial 911. Now available from your iPhone and Android! Providers Seen During Your Hospitalization Provider Specialty Primary office phone Gomez King MD Obstetrics & Gynecology [phone] Your Primary Care Physician (PCP) Primary Care Physician Office Phone Office Fax Jordan Willingham [phone] You are allergic to the following No active allergies Recent Documentation OB Status Smoking Status Pregnant Never Smoker Emergency Contacts Name Discharge Info Relation Home Work Mobile Supa Swanson DISCHARGE CAREGIVER [3] Spouse [3]   [phone] Patient Belongings The following personal items are in your possession at time of discharge: 
                             
 
  
  
Discharge Instructions Attachments/References PREGNANCY: KICK COUNTS (ENGLISH) PREGNANCY: PRECAUTIONS (ENGLISH) Patient Handouts Counting Your Baby's Kicks: Care Instructions Your Care Instructions Counting your baby's kicks is one way your doctor can tell that your baby is healthy. Most women-especially in a first pregnancy-feel their baby move for the first time between 16 and 22 weeks. The movement may feel like flutters rather than kicks. Your baby may move more at certain times of the day. When you are active, you may notice less kicking than when you are resting. At your prenatal visits, your doctor will ask whether the baby is active. In your last trimester, your doctor may ask you to count the number of times you feel your baby move. Follow-up care is a key part of your treatment and safety. Be sure to make and go to all appointments, and call your doctor if you are having problems. It's also a good idea to know your test results and keep a list of the medicines you take. How do you count fetal kicks?  
· A common method of checking your baby's movement is to count the number of kicks or moves you feel in 1 hour. Ten movements (such as kicks, flutters, or rolls) in 1 hour are normal. Some doctors suggest that you count in the morning until you get to 10 movements. Then you can quit for that day and start again the next day. · Pick your baby's most active time of day to count. This may be any time from morning to evening. · If you do not feel 10 movements in an hour, your baby may be sleeping. Wait for the next hour and count again. When should you call for help? Call your doctor now or seek immediate medical care if: 
? · You noticed that your baby has stopped moving or is moving much less than normal. ? Watch closely for changes in your health, and be sure to contact your doctor if you have any problems. Where can you learn more? Go to http://rodney-lambert.info/. Enter X259 in the search box to learn more about \"Counting Your Baby's Kicks: Care Instructions. \" Current as of: 2017 Content Version: 11.4 © 4536-8860 Klarna. Care instructions adapted under license by [employer] (which disclaims liability or warranty for this information). If you have questions about a medical condition or this instruction, always ask your healthcare professional. [address] any warranty or liability for your use of this information. Pregnancy Precautions: Care Instructions Your Care Instructions There is no sure way to prevent labor before your due date ( labor) or to prevent most other pregnancy problems. But there are things you can do to increase your chances of a healthy pregnancy. Go to your appointments, follow your doctor's advice, and take good care of yourself. Eat well, and exercise (if your doctor agrees). And make sure to drink plenty of water. Follow-up care is a key part of your treatment and safety.  Be sure to make and go to all appointments, and call your doctor if you are having problems. It's also a good idea to know your test results and keep a list of the medicines you take. How can you care for yourself at home? · Make sure you go to your prenatal appointments. At each visit, your doctor will check your blood pressure. Your doctor will also check to see if you have protein in your urine. High blood pressure and protein in urine are signs of preeclampsia. This condition can be dangerous for you and your baby. · Drink plenty of fluids, enough so that your urine is light yellow or clear like water. Dehydration can cause contractions. If you have kidney, heart, or liver disease and have to limit fluids, talk with your doctor before you increase the amount of fluids you drink. · Tell your doctor right away if you notice any symptoms of an infection, such as: ¨ Burning when you urinate. ¨ A foul-smelling discharge from your vagina. ¨ Vaginal itching. ¨ Unexplained fever. ¨ Unusual pain or soreness in your uterus or lower belly. · Eat a balanced diet. Include plenty of foods that are high in calcium and iron. ¨ Foods high in calcium include milk, cheese, yogurt, almonds, and broccoli. ¨ Foods high in iron include red meat, shellfish, poultry, eggs, beans, raisins, whole-grain bread, and leafy green vegetables. · Do not smoke. If you need help quitting, talk to your doctor about stop-smoking programs and medicines. These can increase your chances of quitting for good. · Do not drink alcohol or use illegal drugs. · Follow your doctor's directions about activity. Your doctor will let you know how much, if any, exercise you can do. · Ask your doctor if you can have sex. If you are at risk for early labor, your doctor may ask you to not have sex. · Take care to prevent falls. During pregnancy, your joints are loose, and your balance is off.  Sports such as bicycling, skiing, or in-line skating can increase your risk of falling. And don't ride horses or motorcycles, dive, water ski, scuba dive, or parachute jump while you are pregnant. · Avoid getting very hot. Do not use saunas or hot tubs. Avoid staying out in the sun in hot weather for long periods. Take acetaminophen (Tylenol) to lower a high fever. · Do not take any over-the-counter or herbal medicines or supplements without talking to your doctor or pharmacist first. 
When should you call for help? Call 911 anytime you think you may need emergency care. For example, call if: 
? · You passed out (lost consciousness). ? · You have severe vaginal bleeding. ? · You have severe pain in your belly or pelvis. ? · You have had fluid gushing or leaking from your vagina and you know or think the umbilical cord is bulging into your vagina. If this happens, immediately get down on your knees so your rear end (buttocks) is higher than your head. This will decrease the pressure on the cord until help arrives. · ?Call your doctor now or seek immediate medical care if: 
? · You have signs of preeclampsia, such as: 
¨ Sudden swelling of your face, hands, or feet. ¨ New vision problems (such as dimness or blurring). ¨ A severe headache. ? · You have any vaginal bleeding. ? · You have belly pain or cramping. ? · You have a fever. ? · You have had regular contractions (with or without pain) for an hour. This means that you have 8 or more within 1 hour or 4 or more in 20 minutes after you change your position and drink fluids. ? · You have a sudden release of fluid from your vagina. ? · You have low back pain or pelvic pressure that does not go away. ? · You notice that your baby has stopped moving or is moving much less than normal. ? Watch closely for changes in your health, and be sure to contact your doctor if you have any problems. Where can you learn more? Go to http://eric.info/. Enter [id number] in the search box to learn more about \"Pregnancy Precautions: Care Instructions. \" Current as of: March 16, 2017 Content Version: 11.4 © 2006-2017 Healthwise[employer]orporated. Care instructions adapted under license by [employer] (which disclaims liability or warranty for this information). If you have questions about a medical condition or this instruction, always ask your healthcare professional. [address] any warranty or liability for your use of this information. Please provide this summary of care documentation to your next provider. Signatures-by signing, you are acknowledging that this After Visit Summary has been reviewed with you and you have received a copy. Patient Signature:  ____________________________________________________________ Date:  ____________________________________________________________  
  
[facility] Provider Signature:  ____________________________________________________________ Date:  ____________________________________________________________

## 2017-11-17 ENCOUNTER — HOSPITAL ENCOUNTER (EMERGENCY)
Age: 37
Discharge: HOME | End: 2017-11-18
Attending: SPECIALIST | Admitting: OBSTETRICS & GYNECOLOGY
Payer: COMMERCIAL

## 2017-11-17 VITALS
WEIGHT: 166 LBS | HEART RATE: 90 BPM | DIASTOLIC BLOOD PRESSURE: 65 MMHG | BODY MASS INDEX: 23.77 KG/M2 | TEMPERATURE: 98 F | SYSTOLIC BLOOD PRESSURE: 119 MMHG | HEIGHT: 70 IN

## 2017-11-17 NOTE — IP AVS SNAPSHOT
Dadafrederick Chambers 
 
 
 [address] [place] [address] Patient: Sylvia Hawthorne 
MRN: [id number] BLT:1/28/5893 About your hospitalization You were admitted on:  N/A You last received care in the:  [address] [place] [address] You were discharged on:  November 18, 2017 Why you were hospitalized Your primary diagnosis was:  Not on File Things You Need To Do (next 8 weeks) Follow up with Teresa Pereira MD  
  
Phone:  [phone] Where:  [address], [address], [place] [address] [place] [address] Discharge Orders None A check ajay indicates which time of day the medication should be taken. My Medications ASK your physician about these medications Instructions Each Dose to Equal  
 Morning Noon Evening Bedtime  
 progesterone 200 mg capsule Commonly known as:  PROMETRIUM Your last dose was: Your next dose is: Insert 200 mg into rectum daily. 200 mg Discharge Instructions Continue hydration, rest, lie on l side . Call for increased strength and frequency of contractions. Keep office visit as scheduled on Tuesday. Introducing [facility]! Wesly Morrison introduces NanoCompound patient portal. Now you can access parts of your medical record, email your doctor's office, and request medication refills online. 1. In your internet browser, go to [email or web address] 2. Click on the First Time User? Click Here link in the Sign In box. You will see the New Member Sign Up page. 3. Enter your [facility]Compound Access Code exactly as it appears below. You will not need to use this code after youve completed the sign-up process. If you do not sign up before the expiration date, you must request a new code. · [facility]ound Access Code: [email or web address] Expires: 11/18/2017  7:54 PM 
 
 4. Enter the last four digits of your Social Security Number (xxxx) and Date of Birth (mm/dd/yyyy) as indicated and click Submit. You will be taken to the next sign-up page. 5. Create a CrowdTogether ID. This will be your CrowdTogether login ID and cannot be changed, so think of one that is secure and easy to remember. 6. Create a CrowdTogether password. You can change your password at any time. 7. Enter your Password Reset Question and Answer. This can be used at a later time if you forget your password. 8. Enter your e-mail address. You will receive e-mail notification when new information is available in [address] 9. Click Sign Up. You can now view and download portions of your medical record. 10. Click the Download Summary menu link to download a portable copy of your medical information. If you have questions, please visit the Frequently Asked Questions section of the CrowdTogether website. Remember, CrowdTogether is NOT to be used for urgent needs. For medical emergencies, dial 911. Now available from your iPhone and Android! Providers Seen During Your Hospitalization Provider Specialty Primary office phone Sarabjit Salazar MD Obstetrics & Gynecology [phone] Your Primary Care Physician (PCP) Primary Care Physician Office Phone Office Fax Chiki Mahi [phone] You are allergic to the following No active allergies Recent Documentation Height Weight BMI OB Status Smoking Status 1.778 m 75.3 kg 23.82 kg/m2 Pregnant Never Smoker Emergency Contacts Name Discharge Info Relation Home Work Mobile Supa Swanson DISCHARGE CAREGIVER [3] Spouse [3]   [phone] Patient Belongings The following personal items are in your possession at time of discharge: 
                             
 
  
  
 Please provide this summary of care documentation to your next provider. Signatures-by signing, you are acknowledging that this After Visit Summary has been reviewed with you and you have received a copy. Patient Signature:  ____________________________________________________________ Date:  ____________________________________________________________  
  
[facility] Provider Signature:  ____________________________________________________________ Date:  ____________________________________________________________

## 2017-11-17 NOTE — IP AVS SNAPSHOT
Summary of Care Report The Summary of Care report has been created to help improve care coordination. Users with access to [facility] or [address] (Web-based application) may access additional patient information including the Discharge Summary. If you are not currently a [address] user and need more information, please call the number listed below in the [place] section and ask to be connected with Medical Records. Facility Information Name Address Phone [facility] [address] [place] [phone] Patient Information Patient Name Sex Adrianne Bennett ([id number]) Female 1980 Discharge Information Admitting Provider Service Area Unit Vivi Davis MD / Caro Lopez [address] 3 Labor & Delivery / [phone] Discharge Provider Discharge Date/Time Discharge Disposition Destination (none) 2017 (Pending) AHR (none) Patient Language Language ENGLISH [13] Non-Hospital Problems as of 2017  Never Reviewed Class Noted - Resolved Last Modified Active Problems Current pregnancy with history of  labor  9/10/2017 - Present 9/10/2017 by Michael Arizmendi MD  
  Entered by Michael Arizmendi MD  
  
You are allergic to the following No active allergies Current Discharge Medication List  
  
ASK your doctor about these medications Dose & Instructions Dispensing Information Comments  
 progesterone 200 mg capsule Commonly known as:  PROMETRIUM Dose:  200 mg Insert 200 mg into rectum daily. Refills:  0 Follow-up Information Follow up With Details Comments Contact Info Natty Lei MD   [address] [place] [address] 
[phone] Discharge Instructions Continue hydration, rest, lie on l side . Call for increased strength and frequency of contractions. Keep office visit as scheduled on Tuesday. Chart Review Routing History No Routing History on File

## 2017-11-17 NOTE — IP AVS SNAPSHOT
Netta Bennett 
 
 
 [address] [place] [address] Patient: Sathya Franklin 
MRN: [id number] OKB:4/19/3562 My Medications ASK your physician about these medications Instructions Each Dose to Equal  
 Morning Noon Evening Bedtime  
 progesterone 200 mg capsule Commonly known as:  PROMETRIUM Your last dose was: Your next dose is: Insert 200 mg into rectum daily.   
 200 mg

## 2017-11-18 PROCEDURE — 59025 FETAL NON-STRESS TEST: CPT

## 2017-11-18 PROCEDURE — 81003 URINALYSIS AUTO W/O SCOPE: CPT

## 2017-11-18 PROCEDURE — 99283 EMERGENCY DEPT VISIT LOW MDM: CPT

## 2017-11-18 NOTE — PROGRESS NOTES
PRESENTING WITH [facility] ALL DAY SINCE NOON Q 5-7 MINS @ 36. I. oT AWARE BUT NOT TOO. UNCOMFORTABLE.   ucs q 8, Dr Rudy Andrade beeped, URINE NAD, Order to check cervix, and return beep with sbar. 0001 Cervix closed, D/C instructions read and understood. Keeping on monitor for reactive FHT. 0033 FHT REACTIVE,OFF THE MONITOR. PT AMBULATED OFF THE UNIT UNDELIVERED AWARE OF POC.

## 2017-11-18 NOTE — DISCHARGE INSTRUCTIONS
Continue hydration, rest, lie on l side . Call for increased strength and frequency of contractions. Keep office visit as scheduled on Tuesday.

## 2017-11-20 ENCOUNTER — HOSPITAL ENCOUNTER (EMERGENCY)
Age: 37
Discharge: HOME | End: 2017-11-20
Attending: OBSTETRICS & GYNECOLOGY | Admitting: OBSTETRICS & GYNECOLOGY
Payer: COMMERCIAL

## 2017-11-20 VITALS — RESPIRATION RATE: 18 BRPM | BODY MASS INDEX: 23.77 KG/M2 | WEIGHT: 166 LBS | HEIGHT: 70 IN | TEMPERATURE: 97.8 F

## 2017-11-20 PROBLEM — Z34.93 PREGNANT AND NOT YET DELIVERED IN THIRD TRIMESTER: Status: ACTIVE | Noted: 2017-11-20

## 2017-11-20 LAB
APPEARANCE UR: CLEAR
APPEARANCE UR: CLEAR
BILIRUB UR QL: NEGATIVE
BILIRUB UR QL: NEGATIVE
COLOR UR: YELLOW
COLOR UR: YELLOW
GLUCOSE UR QL STRIP.AUTO: NEGATIVE MG/DL
GLUCOSE UR QL STRIP.AUTO: NEGATIVE MG/DL
KETONES UR-MCNC: NEGATIVE MG/DL
KETONES UR-MCNC: NEGATIVE MG/DL
LEUKOCYTE ESTERASE UR QL STRIP: ABNORMAL
LEUKOCYTE ESTERASE UR QL STRIP: ABNORMAL
NITRITE UR QL: NEGATIVE
NITRITE UR QL: NEGATIVE
PH UR: 7 [PH] (ref 5–9)
PH UR: 7 [PH] (ref 5–9)
PROT UR QL: NEGATIVE MG/DL
PROT UR QL: NEGATIVE MG/DL
RBC # UR STRIP: NEGATIVE /UL
RBC # UR STRIP: NEGATIVE /UL
SERVICE CMNT-IMP: ABNORMAL
SERVICE CMNT-IMP: ABNORMAL
SP GR UR: 1.01 (ref 1–1.02)
SP GR UR: 1.01 (ref 1–1.02)
UROBILINOGEN UR QL: 1 EU/DL (ref 0.2–1)
UROBILINOGEN UR QL: 2 EU/DL (ref 0.2–1)

## 2017-11-20 PROCEDURE — 81003 URINALYSIS AUTO W/O SCOPE: CPT

## 2017-11-20 PROCEDURE — 59025 FETAL NON-STRESS TEST: CPT

## 2017-11-20 PROCEDURE — 99283 EMERGENCY DEPT VISIT LOW MDM: CPT

## 2017-11-20 RX ORDER — DIPHENHYDRAMINE HCL 25 MG
25 CAPSULE ORAL
Status: DISCONTINUED | OUTPATIENT
Start: 2017-11-20 | End: 2017-11-20 | Stop reason: HOSPADM

## 2017-11-20 RX ORDER — SODIUM CHLORIDE 0.9 % (FLUSH) 0.9 %
5-10 SYRINGE (ML) INJECTION EVERY 8 HOURS
Status: DISCONTINUED | OUTPATIENT
Start: 2017-11-20 | End: 2017-11-20 | Stop reason: HOSPADM

## 2017-11-20 RX ORDER — SODIUM CHLORIDE 0.9 % (FLUSH) 0.9 %
5-10 SYRINGE (ML) INJECTION AS NEEDED
Status: DISCONTINUED | OUTPATIENT
Start: 2017-11-20 | End: 2017-11-20 | Stop reason: HOSPADM

## 2017-11-20 NOTE — DISCHARGE SUMMARY
Obstetrical Discharge Summary     Name: Lola Diop MRN: [id number]  SSN: [id number]    YOB: 1980  Age: 42 y.o. Sex: female      Admit Date: 2017    Discharge Date: 2017     Admitting Physician: Pepper Logan MD     Attending Physician:  Pepper Logan MD     Admission Diagnoses: maternity  Pregnant and not yet delivered in third trimester    Discharge Diagnoses: This patient has no babies on file. Additional Diagnoses:   Hospital Problems  Never Reviewed          Codes Class Noted POA    Pregnant and not yet delivered in third trimester ICD-10-CM: Z34.93  ICD-9-CM: V22.1  2017 Unknown             Lab Results   Component Value Date/Time    Rubella, External Immune 2017    GrBStrep, External Negative 10/27/2017       Immunization(s):   There is no immunization history on file for this patient. Labs:   Recent Results (from the past 24 hour(s))   POC URINE MACROSCOPIC    Collection Time: 17 12:06 PM   Result Value Ref Range    Color YELLOW      Appearance CLEAR      Spec. gravity (POC) 1.015 1.001 - 1.023      pH, urine  (POC) 7.0 5.0 - 9.0      Protein (POC) NEGATIVE  NEG mg/dL    Glucose, urine (POC) NEGATIVE  NEG mg/dL    Ketones (POC) NEGATIVE  NEG mg/dL    Bilirubin (POC) NEGATIVE  NEG      Blood (POC) NEGATIVE  NEG      Urobilinogen (POC) 2.0 (H) 0.2 - 1.0 EU/dL    Nitrite (POC) NEGATIVE  NEG      Leukocyte esterase (POC) SMALL (A) NEG      Performed by Leopold Planas        Exam:    Fundus firm and nontender  Legs are normal without tenderness, swelling, or redness    Hospital Course: Pt was admitted for observation due to ho  delivery. Pts ctx spaced and cx still 1 cm after several hours. labor precautions given.       Patient Instructions:   Current Discharge Medication List      STOP taking these medications       progesterone (PROMETRIUM) 200 mg capsule Comments:   Reason for Stopping:               Reference my discharge instructions. No orders of the defined types were placed in this encounter.        Signed By:  Sai Webb MD     November 20, 2017

## 2017-11-20 NOTE — IP AVS SNAPSHOT
Denise Paris 
 
 
 [address] [place] [address] Patient: Janice Gallego 
MRN: [id number] ALEX:3/56/4782 My Medications ASK your physician about these medications Instructions Each Dose to Equal  
 Morning Noon Evening Bedtime  
 progesterone 200 mg capsule Commonly known as:  PROMETRIUM Your last dose was: Your next dose is: Insert 200 mg into rectum daily.   
 200 mg

## 2017-11-20 NOTE — IP AVS SNAPSHOT
[address] Patient: Manfred Rivera 
MRN: [id number] ZCM:0/70/5978 About your hospitalization You were admitted on:  N/A You last received care in the:  [facility] [address] [place] [address] You were discharged on:  November 20, 2017 Why you were hospitalized Your primary diagnosis was:  Not on File Your diagnoses also included:  Pregnant And Not Yet Delivered In Third Trimester Discharge Orders None A check ajay indicates which time of day the medication should be taken. My Medications ASK your physician about these medications Instructions Each Dose to Equal  
 Morning Noon Evening Bedtime  
 progesterone 200 mg capsule Commonly known as:  PROMETRIUM Your last dose was: Your next dose is: Insert 200 mg into rectum daily. 200 mg Discharge Instructions None Introducing [facility] SERVICES! Sangeetha Contreras introduces [employer] patient portal. Now you can access parts of your medical record, email your doctor's office, and request medication refills online. 1. In your internet browser, go to [email or web address] 2. Click on the First Time User? Click Here link in the Sign In box. You will see the New Member Sign Up page. 3. Enter your [employer] Access Code exactly as it appears below. You will not need to use this code after youve completed the sign-up process. If you do not sign up before the expiration date, you must request a new code. · [employer] Access Code: [email or web address] Expires: 2/18/2018 12:28 PM 
 
4. Enter the last four digits of your Social Security Number (xxxx) and Date of Birth (mm/dd/yyyy) as indicated and click Submit. You will be taken to the next sign-up page. 5. Create a [employer] ID.  This will be your [employer] login ID and cannot be changed, so think of one that is secure and easy to remember. 6. Create a [employer] password. You can change your password at any time. 7. Enter your Password Reset Question and Answer. This can be used at a later time if you forget your password. 8. Enter your e-mail address. You will receive e-mail notification when new information is available in [address] 9. Click Sign Up. You can now view and download portions of your medical record. 10. Click the Download Summary menu link to download a portable copy of your medical information. If you have questions, please visit the Frequently Asked Questions section of the [employer] website. Remember, [employer] is NOT to be used for urgent needs. For medical emergencies, dial 911. Now available from your iPhone and Android! Providers Seen During Your Hospitalization Provider Specialty Primary office phone Daniel Shelby 7 Gynecology [phone] Your Primary Care Physician (PCP) Primary Care Physician Office Phone Office Fax Sancho Cymeoc [phone] You are allergic to the following No active allergies Recent Documentation Height Weight BMI OB Status Smoking Status 1.778 m 75.3 kg 23.82 kg/m2 Pregnant Never Smoker Emergency Contacts Name Discharge Info Relation Home Work Mobile [employer] DISCHARGE CAREGIVER [3] Spouse [3]   [phone] Patient Belongings The following personal items are in your possession at time of discharge: 
                             
 
  
  
Discharge Instructions Attachments/References PREGNANCY: WEEKS 34 TO 36 (ENGLISH) PREGNANCY: KICK COUNTS (ENGLISH) PREGNANCY: PRECAUTIONS (ENGLISH) Patient Handouts Weeks 34 to 36 of Your Pregnancy: Care Instructions Your Care Instructions By now, your baby and your belly have grown quite large.  It is almost time to give birth. A full-term pregnancy can deliver between 37 and 42 weeks. Your baby's lungs are almost ready to breathe air. The bones in your baby's head are now firm enough to protect it, but soft enough to move down through the birth canal. 
You may feel excited, happy, anxious, or scared. You may wonder how you will know if you are in labor or what to expect during labor. Try to be flexible in your expectations of the birth. Because each birth is different, there is no way to know exactly what childbirth will be like for you. This care sheet will help you know what to expect and how to prepare. This may make your childbirth easier. If you haven't already had the Tdap shot during this pregnancy, talk to your doctor about getting it. It will help protect your  against pertussis infection. In the 36th week, most women have a test for group B streptococcus (GBS). GBS is a common bacteria that can live in the vagina and rectum. It can make your baby sick after birth. If you test positive, you will get antibiotics during labor. The medicine will keep your baby from getting the bacteria. Follow-up care is a key part of your treatment and safety. Be sure to make and go to all appointments, and call your doctor if you are having problems. It's also a good idea to know your test results and keep a list of the medicines you take. How can you care for yourself at home? Learn about pain relief choices · Pain is different for every woman. Talk with your doctor about your feelings about pain. · You can choose from several types of pain relief. These include medicine or breathing techniques, as well as comfort measures. You can use more than one option. · If you choose to have pain medicine during labor, talk to your doctor about your options. Some medicines lower anxiety and help with some of the pain. Others make your lower body numb so that you won't feel pain. · Be sure to tell your doctor about your pain medicine choice before you start labor or very early in your labor. You may be able to change your mind as labor progresses. · Rarely, a woman is put to sleep by medicine given through a mask or an IV. Labor and delivery · The first stage of labor has three parts: early, active, and transition. ¨ Most women have early labor at home. You can stay busy or rest, eat light snacks, drink clear fluids, and start counting contractions. ¨ When talking during a contraction gets hard, you may be moving to active labor. During active labor, you should head for the hospital if you are not there already. ¨ You are in active labor when contractions come every 3 to 4 minutes and last about 60 seconds. Your cervix is opening more rapidly. ¨ If your water breaks, contractions will come faster and stronger. ¨ During transition, your cervix is stretching, and contractions are coming more rapidly. ¨ You may want to push, but your cervix might not be ready. Your doctor will tell you when to push. · The second stage starts when your cervix is completely opened and you are ready to push. ¨ Contractions are very strong to push the baby down the birth canal. 
¨ You will feel the urge to push. You may feel like you need to have a bowel movement. ¨ You may be coached to push with contractions. These contractions will be very strong, but you will not have them as often. You can get a little rest between contractions. ¨ You may be emotional and irritable. You may not be aware of what is going on around you. ¨ One last push, and your baby is born. · The third stage is when a few more contractions push out the placenta. This may take 30 minutes or less. · The fourth stage is the welcome recovery. You may feel overwhelmed with emotions and exhausted but alert. This is a good time to start breastfeeding. Where can you learn more? Go to http://rodney-lambert.info/. Enter Z910 in the search box to learn more about \"Weeks 34 to 36 of Your Pregnancy: Care Instructions. \" Current as of: March 16, 2017 Content Version: 11.4 © 9554-3301 [employer]. Care instructions adapted under license by [employer] (which disclaims liability or warranty for this information). If you have questions about a medical condition or this instruction, always ask your healthcare professional. [place] [address] any warranty or liability for your use of this information. Counting Your Baby's Kicks: Care Instructions Your Care Instructions Counting your baby's kicks is one way your doctor can tell that your baby is healthy. Most women-especially in a first pregnancy-feel their baby move for the first time between 16 and 22 weeks. The movement may feel like flutters rather than kicks. Your baby may move more at certain times of the day. When you are active, you may notice less kicking than when you are resting. At your prenatal visits, your doctor will ask whether the baby is active. In your last trimester, your doctor may ask you to count the number of times you feel your baby move. Follow-up care is a key part of your treatment and safety. Be sure to make and go to all appointments, and call your doctor if you are having problems. It's also a good idea to know your test results and keep a list of the medicines you take. How do you count fetal kicks? · A common method of checking your baby's movement is to count the number of kicks or moves you feel in 1 hour. Ten movements (such as kicks, flutters, or rolls) in 1 hour are normal. Some doctors suggest that you count in the morning until you get to 10 movements. Then you can quit for that day and start again the next day. · Pick your baby's most active time of day to count. This may be any time from morning to evening. · If you do not feel 10 movements in an hour, your baby may be sleeping. Wait for the next hour and count again. When should you call for help? Call your doctor now or seek immediate medical care if: 
? · You noticed that your baby has stopped moving or is moving much less than normal. ? Watch closely for changes in your health, and be sure to contact your doctor if you have any problems. Where can you learn more? Go to http://rodney-lambert.info/. Enter Y271 in the search box to learn more about \"Counting Your Baby's Kicks: Care Instructions. \" Current as of: 2017 Content Version: 11.4 © 1769-7530 [employer]. Care instructions adapted under license by [employer] (which disclaims liability or warranty for this information). If you have questions about a medical condition or this instruction, always ask your healthcare professional. [address] any warranty or liability for your use of this information. Pregnancy Precautions: Care Instructions Your Care Instructions There is no sure way to prevent labor before your due date ( labor) or to prevent most other pregnancy problems. But there are things you can do to increase your chances of a healthy pregnancy. Go to your appointments, follow your doctor's advice, and take good care of yourself. Eat well, and exercise (if your doctor agrees). And make sure to drink plenty of water. Follow-up care is a key part of your treatment and safety. Be sure to make and go to all appointments, and call your doctor if you are having problems. It's also a good idea to know your test results and keep a list of the medicines you take. How can you care for yourself at home? · Make sure you go to your prenatal appointments. At each visit, your doctor will check your blood pressure. Your doctor will also check to see if you have protein in your urine.  High blood pressure and protein in urine are signs of preeclampsia. This condition can be dangerous for you and your baby. · Drink plenty of fluids, enough so that your urine is light yellow or clear like water. Dehydration can cause contractions. If you have kidney, heart, or liver disease and have to limit fluids, talk with your doctor before you increase the amount of fluids you drink. · Tell your doctor right away if you notice any symptoms of an infection, such as: ¨ Burning when you urinate. ¨ A foul-smelling discharge from your vagina. ¨ Vaginal itching. ¨ Unexplained fever. ¨ Unusual pain or soreness in your uterus or lower belly. · Eat a balanced diet. Include plenty of foods that are high in calcium and iron. ¨ Foods high in calcium include milk, cheese, yogurt, almonds, and broccoli. ¨ Foods high in iron include red meat, shellfish, poultry, eggs, beans, raisins, whole-grain bread, and leafy green vegetables. · Do not smoke. If you need help quitting, talk to your doctor about stop-smoking programs and medicines. These can increase your chances of quitting for good. · Do not drink alcohol or use illegal drugs. · Follow your doctor's directions about activity. Your doctor will let you know how much, if any, exercise you can do. · Ask your doctor if you can have sex. If you are at risk for early labor, your doctor may ask you to not have sex. · Take care to prevent falls. During pregnancy, your joints are loose, and your balance is off. Sports such as bicycling, skiing, or in-line skating can increase your risk of falling. And don't ride horses or motorcycles, dive, water ski, scuba dive, or parachute jump while you are pregnant. · Avoid getting very hot. Do not use saunas or hot tubs. Avoid staying out in the sun in hot weather for long periods. Take acetaminophen (Tylenol) to lower a high fever.  
· Do not take any over-the-counter or herbal medicines or supplements without talking to your doctor or pharmacist first. 
 When should you call for help? Call 911 anytime you think you may need emergency care. For example, call if: 
? · You passed out (lost consciousness). ? · You have severe vaginal bleeding. ? · You have severe pain in your belly or pelvis. ? · You have had fluid gushing or leaking from your vagina and you know or think the umbilical cord is bulging into your vagina. If this happens, immediately get down on your knees so your rear end (buttocks) is higher than your head. This will decrease the pressure on the cord until help arrives. · ?Call your doctor now or seek immediate medical care if: 
? · You have signs of preeclampsia, such as: 
¨ Sudden swelling of your face, hands, or feet. ¨ New vision problems (such as dimness or blurring). ¨ A severe headache. ? · You have any vaginal bleeding. ? · You have belly pain or cramping. ? · You have a fever. ? · You have had regular contractions (with or without pain) for an hour. This means that you have 8 or more within 1 hour or 4 or more in 20 minutes after you change your position and drink fluids. ? · You have a sudden release of fluid from your vagina. ? · You have low back pain or pelvic pressure that does not go away. ? · You notice that your baby has stopped moving or is moving much less than normal. ? Watch closely for changes in your health, and be sure to contact your doctor if you have any problems. Where can you learn more? Go to http://rodney-lambert.info/. Enter [id number] in the search box to learn more about \"Pregnancy Precautions: Care Instructions. \" Current as of: March 16, 2017 Content Version: 11.4 © 5898-6372 [employer]. Care instructions adapted under license by [employer] (which disclaims liability or warranty for this information).  If you have questions about a medical condition or this instruction, always ask your healthcare professional. [employer] disclaims any warranty or liability for your use of this information. Please provide this summary of care documentation to your next provider. Signatures-by signing, you are acknowledging that this After Visit Summary has been reviewed with you and you have received a copy. Patient Signature:  ____________________________________________________________ Date:  ____________________________________________________________  
  
Paulene Greener Provider Signature:  ____________________________________________________________ Date:  ____________________________________________________________

## 2017-11-20 NOTE — ROUTINE PROCESS
Pt presents C/O contractions every five minutes since 0500 this am. Pt oriented to room, EFM and toco placed and explained, no c/o vag bleeding or leakage at this time. 6051-Report given to Dr Anil Harrell, orders for two hours of observation given. 1041-Updated report given to Dr Anil Harrell via telephone, will come to unit to further assess pt.  1223- Dr Anil Harrell at bedside, SVE complete no change in cervix observed, plan of care discussed, pt verbalized understanding. 1232- Pt given d/c instructions as seen in [facility], pt verbalized understanding and ambulated out of CFB in stable condition with  at her side.

## 2017-11-22 ENCOUNTER — HOSPITAL ENCOUNTER (OUTPATIENT)
Age: 37
Setting detail: OBSERVATION
Discharge: HOME | End: 2017-11-22
Attending: SPECIALIST | Admitting: OBSTETRICS & GYNECOLOGY
Payer: COMMERCIAL

## 2017-11-22 VITALS
HEART RATE: 87 BPM | OXYGEN SATURATION: 100 % | RESPIRATION RATE: 18 BRPM | HEIGHT: 70 IN | DIASTOLIC BLOOD PRESSURE: 75 MMHG | BODY MASS INDEX: 23.77 KG/M2 | TEMPERATURE: 98.1 F | WEIGHT: 166 LBS | SYSTOLIC BLOOD PRESSURE: 121 MMHG

## 2017-11-22 LAB
ABO + RH BLD: NORMAL
BASOPHILS # BLD: 0 K/UL (ref 0–0.06)
BASOPHILS NFR BLD: 0 % (ref 0–2)
BLOOD GROUP ANTIBODIES SERPL: NORMAL
DIFFERENTIAL METHOD BLD: ABNORMAL
EOSINOPHIL # BLD: 0.1 K/UL (ref 0–0.4)
EOSINOPHIL NFR BLD: 1 % (ref 0–5)
ERYTHROCYTE [DISTWIDTH] IN BLOOD BY AUTOMATED COUNT: 12.8 % (ref 11.6–14.5)
HCT VFR BLD AUTO: 31.7 % (ref 35–45)
HGB BLD-MCNC: 10.4 G/DL (ref 12–16)
LYMPHOCYTES # BLD: 0.9 K/UL (ref 0.9–3.6)
LYMPHOCYTES NFR BLD: 13 % (ref 21–52)
MCH RBC QN AUTO: 27.6 PG (ref 24–34)
MCHC RBC AUTO-ENTMCNC: 32.8 G/DL (ref 31–37)
MCV RBC AUTO: 84.1 FL (ref 74–97)
MONOCYTES # BLD: 0.6 K/UL (ref 0.05–1.2)
MONOCYTES NFR BLD: 9 % (ref 3–10)
NEUTS SEG # BLD: 4.9 K/UL (ref 1.8–8)
NEUTS SEG NFR BLD: 77 % (ref 40–73)
PLATELET # BLD AUTO: 143 K/UL (ref 135–420)
PMV BLD AUTO: 12.2 FL (ref 9.2–11.8)
RBC # BLD AUTO: 3.77 M/UL (ref 4.2–5.3)
SPECIMEN EXP DATE BLD: NORMAL
WBC # BLD AUTO: 6.4 K/UL (ref 4.6–13.2)

## 2017-11-22 PROCEDURE — 86900 BLOOD TYPING SEROLOGIC ABO: CPT | Performed by: SPECIALIST

## 2017-11-22 PROCEDURE — 99282 EMERGENCY DEPT VISIT SF MDM: CPT

## 2017-11-22 PROCEDURE — 59025 FETAL NON-STRESS TEST: CPT

## 2017-11-22 PROCEDURE — 85025 COMPLETE CBC W/AUTO DIFF WBC: CPT | Performed by: SPECIALIST

## 2017-11-22 PROCEDURE — 99218 HC RM OBSERVATION: CPT

## 2017-11-22 PROCEDURE — 65270000029 HC RM PRIVATE

## 2017-11-22 RX ORDER — MAG HYDROX/ALUMINUM HYD/SIMETH 200-200-20
15 SUSPENSION, ORAL (FINAL DOSE FORM) ORAL
Status: DISCONTINUED | OUTPATIENT
Start: 2017-11-22 | End: 2017-11-22 | Stop reason: HOSPADM

## 2017-11-22 RX ORDER — LIDOCAINE HYDROCHLORIDE 10 MG/ML
30 INJECTION, SOLUTION EPIDURAL; INFILTRATION; INTRACAUDAL; PERINEURAL AS NEEDED
Status: DISCONTINUED | OUTPATIENT
Start: 2017-11-22 | End: 2017-11-22 | Stop reason: HOSPADM

## 2017-11-22 RX ORDER — ZOLPIDEM TARTRATE 5 MG/1
10 TABLET ORAL
Status: DISCONTINUED | OUTPATIENT
Start: 2017-11-22 | End: 2017-11-22

## 2017-11-22 RX ORDER — ZOLPIDEM TARTRATE 5 MG/1
5 TABLET ORAL
Status: DISCONTINUED | OUTPATIENT
Start: 2017-11-22 | End: 2017-11-22

## 2017-11-22 RX ORDER — ONDANSETRON 2 MG/ML
4 INJECTION INTRAMUSCULAR; INTRAVENOUS
Status: DISCONTINUED | OUTPATIENT
Start: 2017-11-22 | End: 2017-11-22 | Stop reason: HOSPADM

## 2017-11-22 RX ORDER — TERBUTALINE SULFATE 1 MG/ML
0.25 INJECTION SUBCUTANEOUS
Status: DISCONTINUED | OUTPATIENT
Start: 2017-11-22 | End: 2017-11-22 | Stop reason: HOSPADM

## 2017-11-22 RX ORDER — SODIUM CHLORIDE, SODIUM LACTATE, POTASSIUM CHLORIDE, CALCIUM CHLORIDE 600; 310; 30; 20 MG/100ML; MG/100ML; MG/100ML; MG/100ML
125 INJECTION, SOLUTION INTRAVENOUS CONTINUOUS
Status: DISCONTINUED | OUTPATIENT
Start: 2017-11-22 | End: 2017-11-22 | Stop reason: HOSPADM

## 2017-11-22 RX ORDER — HYDROMORPHONE HCL IN 0.9% NACL 50 MG/50ML
1 PLASTIC BAG, INJECTION (ML) INJECTION
Status: DISCONTINUED | OUTPATIENT
Start: 2017-11-22 | End: 2017-11-22 | Stop reason: HOSPADM

## 2017-11-22 RX ORDER — METHYLERGONOVINE MALEATE 0.2 MG/ML
0.2 INJECTION INTRAVENOUS AS NEEDED
Status: DISCONTINUED | OUTPATIENT
Start: 2017-11-22 | End: 2017-11-22 | Stop reason: HOSPADM

## 2017-11-22 RX ORDER — NALBUPHINE HYDROCHLORIDE 10 MG/ML
10 INJECTION, SOLUTION INTRAMUSCULAR; INTRAVENOUS; SUBCUTANEOUS
Status: DISCONTINUED | OUTPATIENT
Start: 2017-11-22 | End: 2017-11-22 | Stop reason: HOSPADM

## 2017-11-22 RX ORDER — ZOLPIDEM TARTRATE 5 MG/1
5 TABLET ORAL
Status: DISCONTINUED | OUTPATIENT
Start: 2017-11-22 | End: 2017-11-22 | Stop reason: HOSPADM

## 2017-11-22 RX ORDER — OXYTOCIN/RINGER'S LACTATE 20/1000 ML
500 PLASTIC BAG, INJECTION (ML) INTRAVENOUS ONCE
Status: DISCONTINUED | OUTPATIENT
Start: 2017-11-22 | End: 2017-11-22 | Stop reason: HOSPADM

## 2017-11-22 RX ORDER — OXYTOCIN/RINGER'S LACTATE 20/1000 ML
125 PLASTIC BAG, INJECTION (ML) INTRAVENOUS CONTINUOUS
Status: DISCONTINUED | OUTPATIENT
Start: 2017-11-22 | End: 2017-11-22 | Stop reason: HOSPADM

## 2017-11-22 RX ORDER — CARBOPROST TROMETHAMINE 250 UG/ML
250 INJECTION, SOLUTION INTRAMUSCULAR
Status: DISCONTINUED | OUTPATIENT
Start: 2017-11-22 | End: 2017-11-22 | Stop reason: HOSPADM

## 2017-11-22 RX ORDER — MISOPROSTOL 200 UG/1
800 TABLET ORAL
Status: DISCONTINUED | OUTPATIENT
Start: 2017-11-22 | End: 2017-11-22 | Stop reason: HOSPADM

## 2017-11-22 RX ORDER — OXYTOCIN 10 [USP'U]/ML
10 INJECTION, SOLUTION INTRAMUSCULAR; INTRAVENOUS
Status: DISCONTINUED | OUTPATIENT
Start: 2017-11-22 | End: 2017-11-22 | Stop reason: HOSPADM

## 2017-11-22 NOTE — IP AVS SNAPSHOT
[address] Patient: Althea Hernández 
MRN: [id number] UNP:7/45/2701 My Medications Notice You have not been prescribed any medications.

## 2017-11-22 NOTE — H&P
Dandre Dodd MD  [address] [facility]  [address] [facility] [address]  [phone]     Labor and delivery screening visit    Name: Sathya Franklin MRN: [id number]  SSN: [id number]    YOB: 1980  Age: 42 y.o. Sex: female        Elsie Ortega is a 42 y.o. [address] female who is due 2017, by Other Basis. This makes her 36w6d. She  is being seen for No chief complaint on file.  ,   OB History    Para Term  AB Living   7 4 1 3 2 4   SAB TAB Ectopic Molar Multiple Live Births    2    4      # Outcome Date GA Lbr Leon/2nd Weight Sex Delivery Anes PTL Lv   7 Current            6 TAB            5 TAB            4   36w0d    Vag-Spont   CHRISTINA   3   36w0d    Vag-Spont   CHRISTINA   2   30w0d    Vag-Spont   CHRISTINA   1 Term  40w0d    Vag-Spont   CHRISTINA          She is being seen in screening location 3414/. Additional Diagnoses:Present on Admission:  **None**       Historical Additional  Problem List.:   Problem List as of 2017  Never Reviewed          Codes Class Noted - Resolved    Pregnant and not yet delivered in third trimester ICD-10-CM: Z34.93  ICD-9-CM: V22.1  2017 - Present        Current pregnancy with history of  labor ICD-10-CM: O09.219  ICD-9-CM: V23.41  9/10/2017 - Present             No Known Allergies  No past medical history on file. Past Surgical History:   Procedure Laterality Date    BREAST SURGERY PROCEDURE UNLISTED      HX BREAST AUGMENTATION Bilateral     HX OTHER SURGICAL  2017    tubal reversal    HX TUBAL LIGATION       No family history on file. Social History     Social History    Marital status:      Spouse name: N/A    Number of children: N/A    Years of education: N/A     Occupational History    Not on file.      Social History Main Topics    Smoking status: Never Smoker    Smokeless tobacco: Never Used    Alcohol use No    Drug use: No    Sexual activity: Yes     Partners: Male Other Topics Concern    Not on file     Social History Narrative     Prior to Admission medications    Not on File        Objective: There were no vitals taken for this visit. Prenatal Labs:   Lab Results   Component Value Date/Time    Rubella, External Immune 2017    GrBStrep, External Negative 10/27/2017    HBsAg, External Non-reactive 2017    HIV, External Non-reactive 2017    RPR, External Non-reactive 2017    Gonorrhea, External Negative 2017    Chlamydia, External Negative 2017         No results found for this or any previous visit (from the past 24 hour(s)). Fetal Heart Rate:   Baseline FHR: No data found. Fetal heart variability:moderate  Fetal Heart Rate decelerations: none  Fetal Heart Rate accelerations: yes  At least 10 beats elevation and two or more in twenty minutes  Baseline FHR:between 120-160beats per minute  Fetal Non-stress Test: Reactive  Assessment of NST:  Reactive and  No evidence of fetal comprimise         Estimated Date of Delivery: 17  OB History      Para Term  AB Living    7 4 1 3 2 4    SAB TAB Ectopic Molar Multiple Live Births     2    4              Physical Exam: Per myself or nursing assessment:  Patient without distress  HEENT: No obvious head trauma, she can hear at a conversational level, vision is adequate, and no choking or difficulty swallowing. Neurologically: She oriented to time and place as well as understands her situation and give a good medical history. Chest: clear to auscultation and percussion  Abdomen: benign with no obvious gallbladder or appendicits type pain, no CVA tenderness and the fundal size is consistent with her dates within four centimeters. Pelvic: External genitalia normal without inflammation, lesions or abnormal discharge  Extremities: with some swelling but not hyperreflexia  CervicalExam: / / /  -/0 st     VTX on pelvic exam    Impression/Plan:     Plan:   This may the real thing this time, has had steroids, will not do any aggressive treatment to stop and observe for now. Procedures done: screening visit of moderate complexity.                                      Non-stress test, [id number]                             [id number]                            Signed By:  Abi Garcia MD     November 22, 2017

## 2017-11-22 NOTE — PROGRESS NOTES
5716 - Patient arrived to unit with complaints of contractions starting at [address] and have increased in intensity and frequency throughout the night. Patient denies leaking of fluid or decreased fetal movement but does states she has some bleeding with urination. Patient is a  36w6d patient. Dr. Remy Stallworth at bedside for assessment. SVE unchanged from 2 days ago /-1. Orders to admit patient received. 46 - Consents signed. IV started and locked. Labs sent. VS stable. Will continue to monitor.

## 2017-11-22 NOTE — PROGRESS NOTES
After breakfast, pt decided that she wants to go home, Dr. Joanna Sweet made aware, to sen pt home with ambien 5 mg. IV heplock d/c and d/c home with instructions, labor precautions given.

## 2017-11-22 NOTE — ROUTINE PROCESS
Bedside and Verbal shift change report given to Kathleen Dubon RN (oncoming nurse) by Ana Vásquez. Romina Loyd RN (offgoing nurse). Report included the following information SBAR, Recent Results and Med Rec Status. Assumed care of pt. Introduced myself and updated whiteboard, explained nursing plan of care for my shift. Pt alert and oriented; assessment and vitals completed, WNL. Pt denies headache, visual disturbances and epigastric pain. Pt c/o pain that she rates 0/10. Will medicate after assessment. Pt verbalized agreement to plan. Questions answered.

## 2017-11-22 NOTE — PROGRESS NOTES
Labor progress note      Has irregular contractions. Comfortable appearing on my arrival in room      Vitals:  Visit Vitals    /75    Pulse 87    Temp 98.1 °F (36.7 °C)    Resp 18    Ht 5' 10\" (1.778 m)    Wt 75.3 kg (166 lb)    SpO2 100%    Breastfeeding No    BMI 23.82 kg/m2       Temp (24hrs), Av.1 °F (36.7 °C), Min:98 °F (36.7 °C), Max:98.1 °F (36.7 °C)      Labs:  WBC   Date/Time Value Ref Range Status   2017 05:10 AM 6.4 4.6 - 13.2 K/uL Final   09/10/2017 02:45 PM 7.4 4.6 - 13.2 K/uL Final   2017 03:56 PM 6.9 4.0 - 11.0 1000/mm3 Final     HGB   Date/Time Value Ref Range Status   2017 05:10 AM 10.4 (L) 12.0 - 16.0 g/dL Final   09/10/2017 02:45 PM 11.3 (L) 12.0 - 16.0 g/dL Final   2017 03:56 PM 12.5 (L) 13.0 - 17.2 gm/dl Final     PLATELET   Date/Time Value Ref Range Status   2017 05:10  135 - 420 K/uL Final           Physical Exam:  Cervical Exam:  /-2  Membranes:  intact  Uterine Activity: irregular  Fetal Heart Rate: reassuring      Assessment:    contractions at 36.6w  No cervical change    Plan:   Offered dc to home or recheck at noon. Patient wants to rest and be rechecked later.             Jordy Dooley MD

## 2017-11-22 NOTE — IP AVS SNAPSHOT
[address] [place] Patient: Colten Morales 
MRN: [id number] About your hospitalization You were admitted on:  November 22, 2017 You last received care in the:  [address] [place] [address] You were discharged on:  November 22, 2017 Why you were hospitalized Your primary diagnosis was:  Not on File Your diagnoses also included:  Labor And Delivery, Indication For Care Discharge Orders None A check ajay indicates which time of day the medication should be taken. My Medications Notice You have not been prescribed any medications. Discharge Instructions None Introducing [facility]! Micky Black introduces Trippifi patient portal. Now you can access parts of your medical record, email your doctor's office, and request medication refills online. 1. In your internet browser, go to [email or web address] 2. Click on the First Time User? Click Here link in the Sign In box. You will see the New Member Sign Up page. 3. Enter your Trippifi Access Code exactly as it appears below. You will not need to use this code after youve completed the sign-up process. If you do not sign up before the expiration date, you must request a new code. · Trippifi Access Code: [email or web address] Expires: 2/18/2018 12:28 PM 
 
4. Enter the last four digits of your Social Security Number (xxxx) and Date of Birth (mm/dd/yyyy) as indicated and click Submit. You will be taken to the next sign-up page. 5. Create a Trippifi ID. This will be your Trippifi login ID and cannot be changed, so think of one that is secure and easy to remember. 6. Create a Trippifi password. You can change your password at any time. 7. Enter your Password Reset Question and Answer. This can be used at a later time if you forget your password. 8. Enter your e-mail address. You will receive e-mail notification when new information is available in [address] 9. Click Sign Up. You can now view and download portions of your medical record. 10. Click the Download Summary menu link to download a portable copy of your medical information. If you have questions, please visit the Frequently Asked Questions section of the Titansant website. Remember, OneSun is NOT to be used for urgent needs. For medical emergencies, dial 911. Now available from your iPhone and Android! Providers Seen During Your Hospitalization Provider Specialty Primary office phone Perry Lara MD Obstetrics & Gynecology [phone] Your Primary Care Physician (PCP) Primary Care Physician Office Phone Office Fax Roya Zavaleta [phone] You are allergic to the following No active allergies Recent Documentation Height Weight Breastfeeding? BMI OB Status Smoking Status 1.778 m 75.3 kg No 23.82 kg/m2 Pregnant Never Smoker Emergency Contacts Name Discharge Info Relation Home Work Mobile Supa Swanson DISCHARGE CAREGIVER [3] Spouse [3] [phone] Patient Belongings The following personal items are in your possession at time of discharge: 
  Dental Appliances: None  Visual Aid: None      Home Medications: None   Jewelry: Earrings, Ring  Clothing: At bedside    Other Valuables: At bedside Discharge Instructions Attachments/References PREGNANCY: KICK COUNTS (ENGLISH) PREGNANCY: MIKE WAYNE CONTRACTIONS (ENGLISH) PREGNANCY: WEEK 37 (ENGLISH) PREGNANCY: WEEKS 34 TO 36 (ENGLISH) Patient Handouts Counting Your Baby's Kicks: Care Instructions Your Care Instructions Counting your baby's kicks is one way your doctor can tell that your baby is healthy.  Most women-especially in a first pregnancy-feel their baby move for the first time between 16 and 22 weeks. The movement may feel like flutters rather than kicks. Your baby may move more at certain times of the day. When you are active, you may notice less kicking than when you are resting. At your prenatal visits, your doctor will ask whether the baby is active. In your last trimester, your doctor may ask you to count the number of times you feel your baby move. Follow-up care is a key part of your treatment and safety. Be sure to make and go to all appointments, and call your doctor if you are having problems. It's also a good idea to know your test results and keep a list of the medicines you take. How do you count fetal kicks? · A common method of checking your baby's movement is to count the number of kicks or moves you feel in 1 hour. Ten movements (such as kicks, flutters, or rolls) in 1 hour are normal. Some doctors suggest that you count in the morning until you get to 10 movements. Then you can quit for that day and start again the next day. · Pick your baby's most active time of day to count. This may be any time from morning to evening. · If you do not feel 10 movements in an hour, your baby may be sleeping. Wait for the next hour and count again. When should you call for help? Call your doctor now or seek immediate medical care if: 
? · You noticed that your baby has stopped moving or is moving much less than normal. ? Watch closely for changes in your health, and be sure to contact your doctor if you have any problems. Where can you learn more? Go to http://rodney-lambert.info/. Enter I489 in the search box to learn more about \"Counting Your Baby's Kicks: Care Instructions. \" Current as of: March 16, 2017 Content Version: 11.4 © 8843-9575 Healthwise, nuvoTV.  Care instructions adapted under license by Medico.com (which disclaims liability or warranty for this information). If you have questions about a medical condition or this instruction, always ask your healthcare professional. [address] any warranty or liability for your use of this information. Sofia Genera Contractions: Care Instructions Your Care Instructions Issaquena Hidalgo contractions prepare your uterus for labor. Think of them as a \"warm-up\" exercise that your body does. You may begin to feel them between the 28th and 30th weeks of your pregnancy. But they start as early as the 20th week. Francisco Hidalgo contractions usually occur more often during the ninth month. They may go away when you are active and return when you rest. These contractions are like mild contractions of true labor, but they occur less often. (You feel fewer than 8 in an hour.) They don't cause your cervix to open. It may be hard for you to tell the difference between Sofia Genera contractions and true labor, especially in your first pregnancy. Follow-up care is a key part of your treatment and safety. Be sure to make and go to all appointments, and call your doctor if you are having problems. It's also a good idea to know your test results and keep a list of the medicines you take. How can you care for yourself at home? · Try a warm bath to help relieve muscle tension and reduce pain. · Change positions every 30 minutes. Take breaks if you must sit for a long time. Get up and walk around. · Drink plenty of water, enough so that your urine is light yellow or clear like water. · Taking short walks may help you feel better. Your doctor needs to check any contractions that are getting stronger or closer together. Where can you learn more? Go to http://rodney-lambert.info/. Enter 680 [id number] in the search box to learn more about \"Issaquena Hidalgo Contractions: Care Instructions. \" Current as of: March 16, 2017 Content Version: 11.4 © 7965-7779 Healthwise, ReachLocal. Care instructions adapted under license by Thinglink (which disclaims liability or warranty for this information). If you have questions about a medical condition or this instruction, always ask your healthcare professional. [place] any warranty or liability for your use of this information. Week 37 of Your Pregnancy: Care Instructions Your Care Instructions You are near the end of your pregnancy-and you're probably pretty uncomfortable. It may be harder to walk around. Lying down probably isn't comfortable either. You may have trouble getting to sleep or staying asleep. Most women deliver their babies between 40 and 41 weeks. This is a good time to think about packing a bag for the hospital with items you'll need. Then you'll be ready when labor starts. Follow-up care is a key part of your treatment and safety. Be sure to make and go to all appointments, and call your doctor if you are having problems. It's also a good idea to know your test results and keep a list of the medicines you take. How can you care for yourself at home? Learn about breastfeeding · Breastfeeding is best for your baby and good for you. · Breast milk has antibodies to help your baby fight infections. · Mothers who breastfeed often lose weight faster, because making milk burns calories. · Learning the best ways to hold your baby will make breastfeeding easier. · Let your partner bathe and diaper the baby to keep your partner from feeling left out. Snuggle together when you breastfeed. · You may want to learn how to use a breast pump and store your milk. · If you choose to bottle feed, make the feeding feel like breastfeeding so you can bond with your baby. Always hold your baby and the bottle. Do not prop bottles or let your baby fall asleep with a bottle. Learn about crying · It is common for babies to cry for 1 to 3 hours a day. Some cry more, some cry less. · Babies don't cry to make you upset or because you are a bad parent. · Crying is how your baby communicates. Your baby may be hungry; have gas; need a diaper change; or feel cold, warm, tired, lonely, or tense. Sometimes babies cry for unknown reasons. · If you respond to your baby's needs, he or she will learn to trust you. · Try to stay calm when your baby cries. Your baby may get more upset if he or she senses that you are upset. Know how to care for your  · Your baby's umbilical cord stump will drop off on its own, usually between 1 and 2 weeks. To care for your baby's umbilical cord area: ¨ Clean the area at the bottom of the cord 2 or 3 times a day. ¨ Pay special attention to the area where the cord attaches to the skin. ¨ Keep the diaper folded below the cord. ¨ Use a damp washcloth or cotton ball to sponge bathe your baby until the stump has come off. · Your baby's first dark stool is called meconium. After the meconium is passed, your baby will develop his or her own bowel pattern. ¨ Some babies, especially  babies, have several bowel movements a day. Others have one or two a day, or one every 2 to 3 days. ¨  babies often have loose, yellow stools. Formula-fed babies have more formed stools. ¨ If your baby's stools look like little pellets, he or she is constipated. After 2 days of constipation, call your baby's doctor. · If your baby will be circumcised, you can care for him at home. ¨ Gently rinse his penis with warm water after every diaper change. Do not try to remove the film that forms on the penis. This film will go away on its own. Pat dry. ¨ Put petroleum ointment, such as Vaseline, on the area of the diaper that will touch your baby's penis. This will keep the diaper from sticking to your baby. ¨ Ask the doctor about giving your baby acetaminophen (Tylenol) for pain. Where can you learn more? Go to http://rodney-lambert.info/. Enter 68 [phone] [id number] in the search box to learn more about \"Week 37 of Your Pregnancy: Care Instructions. \" Current as of: 2017 Content Version: 11.4 © 0506-7869 datango. Care instructions adapted under license by madvertise (which disclaims liability or warranty for this information). If you have questions about a medical condition or this instruction, always ask your healthcare professional. [place] [address] any warranty or liability for your use of this information. Weeks 34 to 36 of Your Pregnancy: Care Instructions Your Care Instructions By now, your baby and your belly have grown quite large. It is almost time to give birth. A full-term pregnancy can deliver between 37 and 42 weeks. Your baby's lungs are almost ready to breathe air. The bones in your baby's head are now firm enough to protect it, but soft enough to move down through the birth canal. 
You may feel excited, happy, anxious, or scared. You may wonder how you will know if you are in labor or what to expect during labor. Try to be flexible in your expectations of the birth. Because each birth is different, there is no way to know exactly what childbirth will be like for you. This care sheet will help you know what to expect and how to prepare. This may make your childbirth easier. If you haven't already had the Tdap shot during this pregnancy, talk to your doctor about getting it. It will help protect your  against pertussis infection. In the 36th week, most women have a test for group B streptococcus (GBS). GBS is a common bacteria that can live in the vagina and rectum. It can make your baby sick after birth. If you test positive, you will get antibiotics during labor. The medicine will keep your baby from getting the bacteria. Follow-up care is a key part of your treatment and safety. Be sure to make and go to all appointments, and call your doctor if you are having problems. It's also a good idea to know your test results and keep a list of the medicines you take. How can you care for yourself at home? Learn about pain relief choices · Pain is different for every woman. Talk with your doctor about your feelings about pain. · You can choose from several types of pain relief. These include medicine or breathing techniques, as well as comfort measures. You can use more than one option. · If you choose to have pain medicine during labor, talk to your doctor about your options. Some medicines lower anxiety and help with some of the pain. Others make your lower body numb so that you won't feel pain. · Be sure to tell your doctor about your pain medicine choice before you start labor or very early in your labor. You may be able to change your mind as labor progresses. · Rarely, a woman is put to sleep by medicine given through a mask or an IV. Labor and delivery · The first stage of labor has three parts: early, active, and transition. ¨ Most women have early labor at home. You can stay busy or rest, eat light snacks, drink clear fluids, and start counting contractions. ¨ When talking during a contraction gets hard, you may be moving to active labor. During active labor, you should head for the hospital if you are not there already. ¨ You are in active labor when contractions come every 3 to 4 minutes and last about 60 seconds. Your cervix is opening more rapidly. ¨ If your water breaks, contractions will come faster and stronger. ¨ During transition, your cervix is stretching, and contractions are coming more rapidly. ¨ You may want to push, but your cervix might not be ready. Your doctor will tell you when to push. · The second stage starts when your cervix is completely opened and you are ready to push. ¨ Contractions are very strong to push the baby down the birth canal. 
¨ You will feel the urge to push. You may feel like you need to have a bowel movement. ¨ You may be coached to push with contractions. These contractions will be very strong, but you will not have them as often. You can get a little rest between contractions. ¨ You may be emotional and irritable. You may not be aware of what is going on around you. ¨ One last push, and your baby is born. · The third stage is when a few more contractions push out the placenta. This may take 30 minutes or less. · The fourth stage is the welcome recovery. You may feel overwhelmed with emotions and exhausted but alert. This is a good time to start breastfeeding. Where can you learn more? Go to http://rodney-lambert.info/. Enter P140 in the search box to learn more about \"Weeks 34 to 36 of Your Pregnancy: Care Instructions. \" Current as of: March 16, 2017 Content Version: 11.4 © 8870-6084 ScribbleLive. Care instructions adapted under license by Posterbee (which disclaims liability or warranty for this information). If you have questions about a medical condition or this instruction, always ask your healthcare professional. [address] any warranty or liability for your use of this information. Please provide this summary of care documentation to your next provider. Signatures-by signing, you are acknowledging that this After Visit Summary has been reviewed with you and you have received a copy. Patient Signature:  ____________________________________________________________ Date:  ____________________________________________________________  
  
Val Avina Provider Signature:  ____________________________________________________________ Date:  ____________________________________________________________

## 2017-11-26 ENCOUNTER — ANESTHESIA EVENT (OUTPATIENT)
Dept: LABOR AND DELIVERY | Age: 37
End: 2017-11-26
Payer: COMMERCIAL

## 2017-11-26 ENCOUNTER — ANESTHESIA (OUTPATIENT)
Dept: LABOR AND DELIVERY | Age: 37
End: 2017-11-26
Payer: COMMERCIAL

## 2017-11-26 ENCOUNTER — HOSPITAL ENCOUNTER (INPATIENT)
Age: 37
LOS: 1 days | Discharge: HOME | End: 2017-11-27
Attending: SPECIALIST | Admitting: SPECIALIST
Payer: COMMERCIAL

## 2017-11-26 PROBLEM — Z34.90 PREGNANCY: Status: ACTIVE | Noted: 2017-11-26

## 2017-11-26 LAB
ABO + RH BLD: NORMAL
BASOPHILS # BLD: 0 K/UL (ref 0–0.06)
BASOPHILS NFR BLD: 0 % (ref 0–2)
BLOOD GROUP ANTIBODIES SERPL: NORMAL
DIFFERENTIAL METHOD BLD: ABNORMAL
EOSINOPHIL # BLD: 0.1 K/UL (ref 0–0.4)
EOSINOPHIL NFR BLD: 1 % (ref 0–5)
ERYTHROCYTE [DISTWIDTH] IN BLOOD BY AUTOMATED COUNT: 12.8 % (ref 11.6–14.5)
HCT VFR BLD AUTO: 32.7 % (ref 35–45)
HGB BLD-MCNC: 10.5 G/DL (ref 12–16)
LYMPHOCYTES # BLD: 1 K/UL (ref 0.9–3.6)
LYMPHOCYTES NFR BLD: 13 % (ref 21–52)
MCH RBC QN AUTO: 27.1 PG (ref 24–34)
MCHC RBC AUTO-ENTMCNC: 32.1 G/DL (ref 31–37)
MCV RBC AUTO: 84.5 FL (ref 74–97)
MONOCYTES # BLD: 0.9 K/UL (ref 0.05–1.2)
MONOCYTES NFR BLD: 12 % (ref 3–10)
NEUTS SEG # BLD: 5.7 K/UL (ref 1.8–8)
NEUTS SEG NFR BLD: 74 % (ref 40–73)
PLATELET # BLD AUTO: 142 K/UL (ref 135–420)
PMV BLD AUTO: 11.7 FL (ref 9.2–11.8)
RBC # BLD AUTO: 3.87 M/UL (ref 4.2–5.3)
SPECIMEN EXP DATE BLD: NORMAL
WBC # BLD AUTO: 7.7 K/UL (ref 4.6–13.2)

## 2017-11-26 PROCEDURE — 74011250637 HC RX REV CODE- 250/637: Performed by: SPECIALIST

## 2017-11-26 PROCEDURE — 3E0R3BZ INTRODUCTION OF ANESTHETIC AGENT INTO SPINAL CANAL, PERCUTANEOUS APPROACH: ICD-10-PCS | Performed by: NURSE ANESTHETIST, CERTIFIED REGISTERED

## 2017-11-26 PROCEDURE — 86900 BLOOD TYPING SEROLOGIC ABO: CPT | Performed by: SPECIALIST

## 2017-11-26 PROCEDURE — 76060000078 HC EPIDURAL ANESTHESIA

## 2017-11-26 PROCEDURE — 77030034849

## 2017-11-26 PROCEDURE — 65270000029 HC RM PRIVATE

## 2017-11-26 PROCEDURE — 74011250636 HC RX REV CODE- 250/636: Performed by: SPECIALIST

## 2017-11-26 PROCEDURE — 74011250636 HC RX REV CODE- 250/636

## 2017-11-26 PROCEDURE — 74011000250 HC RX REV CODE- 250

## 2017-11-26 PROCEDURE — 85025 COMPLETE CBC W/AUTO DIFF WBC: CPT | Performed by: SPECIALIST

## 2017-11-26 PROCEDURE — 77030020255 HC SOL INJ LR 1000ML BG

## 2017-11-26 PROCEDURE — 77030007879 HC KT SPN EPDRL TELE -B: Performed by: NURSE ANESTHETIST, CERTIFIED REGISTERED

## 2017-11-26 PROCEDURE — 75410000002 HC LABOR FEE PER 1 HR

## 2017-11-26 PROCEDURE — 75410000003 HC RECOV DEL/VAG/CSECN EA 0.5 HR

## 2017-11-26 PROCEDURE — 75410000000 HC DELIVERY VAGINAL/SINGLE

## 2017-11-26 RX ORDER — LIDOCAINE HYDROCHLORIDE AND EPINEPHRINE 15; 5 MG/ML; UG/ML
INJECTION, SOLUTION EPIDURAL AS NEEDED
Status: DISCONTINUED | OUTPATIENT
Start: 2017-11-26 | End: 2017-11-26 | Stop reason: HOSPADM

## 2017-11-26 RX ORDER — MAG HYDROX/ALUMINUM HYD/SIMETH 200-200-20
15 SUSPENSION, ORAL (FINAL DOSE FORM) ORAL
Status: DISCONTINUED | OUTPATIENT
Start: 2017-11-26 | End: 2017-11-26 | Stop reason: HOSPADM

## 2017-11-26 RX ORDER — PROMETHAZINE HYDROCHLORIDE 25 MG/ML
25 INJECTION, SOLUTION INTRAMUSCULAR; INTRAVENOUS
Status: DISCONTINUED | OUTPATIENT
Start: 2017-11-26 | End: 2017-11-27 | Stop reason: HOSPADM

## 2017-11-26 RX ORDER — METHYLERGONOVINE MALEATE 0.2 MG/ML
0.2 INJECTION INTRAVENOUS AS NEEDED
Status: DISCONTINUED | OUTPATIENT
Start: 2017-11-26 | End: 2017-11-27 | Stop reason: HOSPADM

## 2017-11-26 RX ORDER — MISOPROSTOL 200 UG/1
800 TABLET ORAL
Status: DISCONTINUED | OUTPATIENT
Start: 2017-11-26 | End: 2017-11-27 | Stop reason: HOSPADM

## 2017-11-26 RX ORDER — BUPIVACAINE HYDROCHLORIDE 2.5 MG/ML
INJECTION, SOLUTION EPIDURAL; INFILTRATION; INTRACAUDAL AS NEEDED
Status: DISCONTINUED | OUTPATIENT
Start: 2017-11-26 | End: 2017-11-26 | Stop reason: HOSPADM

## 2017-11-26 RX ORDER — SODIUM CHLORIDE 0.9 % (FLUSH) 0.9 %
5-10 SYRINGE (ML) INJECTION EVERY 8 HOURS
Status: DISCONTINUED | OUTPATIENT
Start: 2017-11-26 | End: 2017-11-26 | Stop reason: HOSPADM

## 2017-11-26 RX ORDER — OXYTOCIN/RINGER'S LACTATE 20/1000 ML
500 PLASTIC BAG, INJECTION (ML) INTRAVENOUS ONCE
Status: COMPLETED | OUTPATIENT
Start: 2017-11-26 | End: 2017-11-26

## 2017-11-26 RX ORDER — SODIUM CHLORIDE 0.9 % (FLUSH) 0.9 %
5-10 SYRINGE (ML) INJECTION AS NEEDED
Status: DISCONTINUED | OUTPATIENT
Start: 2017-11-26 | End: 2017-11-26 | Stop reason: HOSPADM

## 2017-11-26 RX ORDER — LIDOCAINE HYDROCHLORIDE 10 MG/ML
30 INJECTION, SOLUTION EPIDURAL; INFILTRATION; INTRACAUDAL; PERINEURAL AS NEEDED
Status: DISCONTINUED | OUTPATIENT
Start: 2017-11-26 | End: 2017-11-26 | Stop reason: HOSPADM

## 2017-11-26 RX ORDER — IBUPROFEN 400 MG/1
800 TABLET ORAL
Status: DISCONTINUED | OUTPATIENT
Start: 2017-11-26 | End: 2017-11-27 | Stop reason: HOSPADM

## 2017-11-26 RX ORDER — ONDANSETRON 2 MG/ML
4 INJECTION INTRAMUSCULAR; INTRAVENOUS
Status: DISCONTINUED | OUTPATIENT
Start: 2017-11-26 | End: 2017-11-26 | Stop reason: HOSPADM

## 2017-11-26 RX ORDER — FENTANYL CITRATE 50 UG/ML
INJECTION, SOLUTION INTRAMUSCULAR; INTRAVENOUS
Status: COMPLETED
Start: 2017-11-26 | End: 2017-11-26

## 2017-11-26 RX ORDER — MINERAL OIL
30 OIL (ML) ORAL AS NEEDED
Status: COMPLETED | OUTPATIENT
Start: 2017-11-26 | End: 2017-11-26

## 2017-11-26 RX ORDER — OXYTOCIN 10 [USP'U]/ML
10 INJECTION, SOLUTION INTRAMUSCULAR; INTRAVENOUS
Status: DISCONTINUED | OUTPATIENT
Start: 2017-11-26 | End: 2017-11-26 | Stop reason: HOSPADM

## 2017-11-26 RX ORDER — NALBUPHINE HYDROCHLORIDE 10 MG/ML
10 INJECTION, SOLUTION INTRAMUSCULAR; INTRAVENOUS; SUBCUTANEOUS
Status: DISCONTINUED | OUTPATIENT
Start: 2017-11-26 | End: 2017-11-26 | Stop reason: HOSPADM

## 2017-11-26 RX ORDER — HYDROMORPHONE HCL IN 0.9% NACL 50 MG/50ML
1 PLASTIC BAG, INJECTION (ML) INJECTION
Status: DISCONTINUED | OUTPATIENT
Start: 2017-11-26 | End: 2017-11-26 | Stop reason: HOSPADM

## 2017-11-26 RX ORDER — FENTANYL CITRATE 50 UG/ML
100 INJECTION, SOLUTION INTRAMUSCULAR; INTRAVENOUS ONCE
Status: COMPLETED | OUTPATIENT
Start: 2017-11-26 | End: 2017-11-26

## 2017-11-26 RX ORDER — TERBUTALINE SULFATE 1 MG/ML
0.25 INJECTION SUBCUTANEOUS
Status: DISCONTINUED | OUTPATIENT
Start: 2017-11-26 | End: 2017-11-26 | Stop reason: HOSPADM

## 2017-11-26 RX ORDER — ZOLPIDEM TARTRATE 5 MG/1
5 TABLET ORAL
Status: DISCONTINUED | OUTPATIENT
Start: 2017-11-26 | End: 2017-11-27 | Stop reason: HOSPADM

## 2017-11-26 RX ORDER — ACETAMINOPHEN 325 MG/1
650 TABLET ORAL
Status: DISCONTINUED | OUTPATIENT
Start: 2017-11-26 | End: 2017-11-27 | Stop reason: HOSPADM

## 2017-11-26 RX ORDER — ACETAMINOPHEN 325 MG/1
650 TABLET ORAL
Status: DISCONTINUED | OUTPATIENT
Start: 2017-11-26 | End: 2017-11-26

## 2017-11-26 RX ORDER — AMOXICILLIN 250 MG
1 CAPSULE ORAL
Status: DISCONTINUED | OUTPATIENT
Start: 2017-11-26 | End: 2017-11-27 | Stop reason: HOSPADM

## 2017-11-26 RX ORDER — SODIUM CHLORIDE, SODIUM LACTATE, POTASSIUM CHLORIDE, CALCIUM CHLORIDE 600; 310; 30; 20 MG/100ML; MG/100ML; MG/100ML; MG/100ML
125 INJECTION, SOLUTION INTRAVENOUS CONTINUOUS
Status: DISCONTINUED | OUTPATIENT
Start: 2017-11-26 | End: 2017-11-26 | Stop reason: HOSPADM

## 2017-11-26 RX ORDER — OXYTOCIN/RINGER'S LACTATE 20/1000 ML
125 PLASTIC BAG, INJECTION (ML) INTRAVENOUS CONTINUOUS
Status: DISCONTINUED | OUTPATIENT
Start: 2017-11-26 | End: 2017-11-26 | Stop reason: HOSPADM

## 2017-11-26 RX ORDER — OXYCODONE AND ACETAMINOPHEN 5; 325 MG/1; MG/1
2 TABLET ORAL
Status: DISCONTINUED | OUTPATIENT
Start: 2017-11-26 | End: 2017-11-27 | Stop reason: HOSPADM

## 2017-11-26 RX ADMIN — SODIUM CHLORIDE, SODIUM LACTATE, POTASSIUM CHLORIDE, AND CALCIUM CHLORIDE 500 ML: 600; 310; 30; 20 INJECTION, SOLUTION INTRAVENOUS at 01:40

## 2017-11-26 RX ADMIN — FENTANYL CITRATE 100 MCG: 50 INJECTION, SOLUTION INTRAMUSCULAR; INTRAVENOUS at 02:24

## 2017-11-26 RX ADMIN — IBUPROFEN 800 MG: 400 TABLET, FILM COATED ORAL at 08:49

## 2017-11-26 RX ADMIN — MINERAL OIL 30 ML: 99.9 LIQUID ORAL; TOPICAL at 02:57

## 2017-11-26 RX ADMIN — Medication 125 ML/HR: at 06:00

## 2017-11-26 RX ADMIN — LIDOCAINE HYDROCHLORIDE AND EPINEPHRINE 3 ML: 15; 5 INJECTION, SOLUTION EPIDURAL at 02:23

## 2017-11-26 RX ADMIN — Medication 10000 MILLI-UNITS/HR: at 03:05

## 2017-11-26 RX ADMIN — BUPIVACAINE HYDROCHLORIDE 8 ML: 2.5 INJECTION, SOLUTION EPIDURAL; INFILTRATION; INTRACAUDAL at 02:21

## 2017-11-26 RX ADMIN — IBUPROFEN 800 MG: 400 TABLET, FILM COATED ORAL at 18:03

## 2017-11-26 RX ADMIN — SODIUM CHLORIDE, SODIUM LACTATE, POTASSIUM CHLORIDE, AND CALCIUM CHLORIDE 500 ML: 600; 310; 30; 20 INJECTION, SOLUTION INTRAVENOUS at 02:06

## 2017-11-26 RX ADMIN — ACETAMINOPHEN 650 MG: 325 TABLET, FILM COATED ORAL at 19:31

## 2017-11-26 RX ADMIN — Medication 10 ML/HR: at 02:28

## 2017-11-26 NOTE — PROGRESS NOTES
Pt reports to L&D via wheelchair complaining of contractions every 3-5 minutes. Denies leaking of fluid, reports small amount of bleeding and mucus. Pt changed to gown, placed on monitor. . SVE performed 5/80/-3.

## 2017-11-26 NOTE — H&P
Obstetrical History & Physical    Name: Sidney Cervantes MRN: [id number]  SSN: [id number]    YOB: 1980  Age: 42 y.o. Sex: female      Assessment/Plan:     IDENTIFYING DATA  Sidney Cervantes is a  42 y.o. pregnant patient. Estimated Date of Delivery: 17  Her Estimated Gestational Age is 37w3d weeks. IMPRESSION:    SOL TIUP Tubal reversal.   Presents 5 cm tay wants epidural - OK. RECOMMENDATIONS:    IV fluids, epidural, manage labor. See prenatal record. Subjective:   Chief complaint:    Chief Complaint   Patient presents with    Contractions     Estimated Date of Delivery: 17  OB History      Para Term  AB Living    7 4 1 3 2 4    SAB TAB Ectopic Molar Multiple Live Births     2    4          No past medical history on file. Past Surgical History:   Procedure Laterality Date    BREAST SURGERY PROCEDURE UNLISTED      HX BREAST AUGMENTATION Bilateral 2013    HX OTHER SURGICAL  2017    tubal reversal    HX TUBAL LIGATION       Social History     Occupational History    Not on file. Social History Main Topics    Smoking status: Never Smoker    Smokeless tobacco: Never Used    Alcohol use No    Drug use: No    Sexual activity: Yes     Partners: Male     No family history on file. No Known Allergies  Prior to Admission medications    Not on File        Review of Systems: A comprehensive review of systems was negative except for that written in the HPI. Objective:   Vitals:  Vitals:    17 0042   Temp: 98.2 °F (36.8 °C)      FETAL MONITORING:  Baseline FHR: No data found. Membranes:  Intact    Labs:  No results found for this or any previous visit (from the past 12 hour(s)). GENERAL APPEARANCE:  HEAD, EYES, EARS, NOSE, THROAT:Normocephalic. EOMI, Hearing intact. NECK: Supple with normal appearance. THYROID: No apparent abnormal size, nodularity or asymmetry.    RESPIRATORY EFFORT: Breathing comfortably without apparent difficulty. HEART: Pulse normal.  REFLEXES: normal.  ABDOMEN: Appropriate for gestational age. SCARS: none. SKIN: Unusual coloration not apparent. NEUROPSYCHIATRIC: Oriented to time and place with grossly appropriate affect. EXTERNAL GENITALIA: Grossly normal age appropriate appearance. Lesions, Trauma, Bartholin's abscess, Vulvitis- not detected. URETHRAL MEATUS: Normal appearance. URETHRA: Masses, Tenderness, Prolapse, Lesions - not detected. BLADDER: Fullness, masses, tenderness - not detected.      Prenatal Labs:   Lab Results   Component Value Date/Time    Rubella, External Immune 04/13/2017    GrBStrep, External Negative 10/27/2017    HBsAg, External Non-reactive 04/13/2017    HIV, External Non-reactive 04/13/2017    RPR, External Non-reactive 04/13/2017    Gonorrhea, External Negative 04/13/2017    Chlamydia, External Negative 04/13/2017         Signed By:    Pascale Noe MD  November 26, 2017 12:58 AM

## 2017-11-26 NOTE — ANESTHESIA PROCEDURE NOTES
Epidural Block    Start time: 11/26/2017 2:11 AM  End time: 11/26/2017 2:30 AM  Performed by: Stanislav Handley  Authorized by: Stanislav Handley     Pre-Procedure  Indication: labor epidural    Preanesthetic Checklist: patient identified, risks and benefits discussed, anesthesia consent, site marked, patient being monitored, timeout performed and anesthesia consent    Timeout Time: 02:14        Epidural:   Patient position:  Seated  Prep region:  Lumbar  Prep: Betadine and Patient draped    Location:  L3-4    Needle and Epidural Catheter:   Needle Type:  Tuohy  Needle Gauge:  18 G  Injection Technique:  Loss of resistance using air  Attempts:  1  Catheter Size:  19 G  Catheter at Skin Depth (cm):  9  Depth in Epidural Space (cm):  4  Events: no blood with aspiration, no cerebrospinal fluid with aspiration and negative aspiration test    Events comment:  Left leg paresthesia  Test Dose:  Lidocaine 1.5% w/ epi and negative    Assessment:   Catheter Secured:  Tegaderm and tape  Insertion:  Uncomplicated  Patient tolerance:  Patient tolerated the procedure well with no immediate complications  Fentanyl 992 mcg via Epidural cath.

## 2017-11-26 NOTE — PROGRESS NOTES
Checked in on patient, eating lunch. No concerns or needs noted at this time. Family at bedside. 1300- Hourly rounding done, no complaints or needs at this time.

## 2017-11-26 NOTE — PROGRESS NOTES
Spoke to Dr. Dayna Gutierrez by phone to report pt arrival.  Orders to admit, start IV, epidural ok once labs back.

## 2017-11-26 NOTE — IP AVS SNAPSHOT
Claude Gomez 
 
 
 [address] [place] [address] Patient: Maggie Kennedy 
MRN: [id number] GWF:[id number] About your hospitalization You were admitted on:  November 26, 2017 You last received care in the:  [place] [address] You were discharged on:  November 27, 2017 Why you were hospitalized Your primary diagnosis was:  Not on File Your diagnoses also included:  Pregnancy Things You Need To Do (next 8 weeks) Follow up with Mendez Cole MD  
  
Phone:  [phone] Where:  [address], [address], [facility] [address] Follow up with [address] [place] [address] in 6 week(s)  
postpartum check Phone:  [phone] Where:  [address], [address], [place] [id number] Discharge Orders None A check ajay indicates which time of day the medication should be taken. My Medications Notice You have not been prescribed any medications. Discharge Instructions CONGRATULATIONS ON THE BIRTH OF YOUR BABY! The first six weeks after childbirth is a time of physical and emotional adjustment. This handout will help to answer questions and provide guidance during the postpartum period. Every family's adjustment is unique, so please call if you have further concerns. At anytime we can be reached at [phone]. During office hours please ask to speak to a charge nurse. After hours, the answering service will take a message and the Nurse-Midwife on-call will return your call. If your question can wait until office hours: Monday-Friday 8:30-4:00, please do so. For emergencies or urgent concerns do not hesitate to call us after hours. DIET Your body is in need of a well-balanced, high protein diet to recuperate from birth.   Please continue to take your prenatal vitamins for 6 weeks or as long as you are breastfeeding. Continue to drink at least 6-8 cups of water or other liquid a day. A breastfeeding mother also needs extra protein, calories and calcium containing foods. It is a good rule to drink fluids with every feeding in order to maintain an adequate milk supply and avoid dehydration. Your baby will probably not be bothered by things in your diet, but if the baby seems extremely fussy or develops a rash, you may want to discuss possible food intolerances with your baby's care provider. PAIN MEDICATIONS Acetaminophen (Tylenol), ibuprofen (Motrin), or other prescribed pain medication may be taken as directed to relieve discomfort. The above medications pass in very minimal amounts into the breast milk and usually will not cause problems. There are medications that may affect the baby, so please consult your baby's care provider before taking medication. If you are breastfeeding, be sure to mention this to any care provider you see so that medications that are safe may be selected. There is an excellent resource called [employer] that is a resource for medication safety in pregnancy and lactation. You can visit their website at [employer]/ or call them toll free at [phone] if you have any questions about medication safety. UTERINE INVOLUTION / VAGINAL BLEEDING Involution is the process of the uterus returning to pre-pregnant size. It will take approximately six weeks for this process to occur. To achieve this size your uterus becomes firm to slow bleeding loss from the placental site. The first 7 days after birth, the bleeding is red and heavy. It may change with your activity and position. Some small clots are normal.   After ten days, the bleeding should be pale pink and slowed considerably. The next several weeks may progress to a pink, mucousy discharge. This may continue for 6-8 weeks, depending on your activity. During the first four weeks after delivery we recommend using sanitary pads instead of tampons. Douching should also be avoided, but it is fine to take a tub bath so long as the tub is very clean. ACTIVITY/EXERCISE Adequate rest is essential to recovery. Try to rest or sleep when the baby sleeps. After two weeks, you may begin going for short walks, doing Kegel exercises and abdominal crunches. Avoid heavy, jarring or aerobic exercises. Remember to start out slowly and build up to your previous fitness level. Use common sense and don't overdo as rest is important and the benefits of increased rest are a quicker recovery. For the first two weeks after a  try to limit trips up or down steps. Do not lift anything heavier than the baby during this time. Lifting the baby or other objects should be done by bending at the knees rather than the waist.  Driving should be avoided during the first two to three weeks until you have the strength to push firmly on the brakes in case of an emergency. You may ride as a passenger, but DO wear a seat belt at all times. After a few weeks, you may resume normal activity at whatever pace is comfortable for you. Exercise may also be resumed gradually. Walking is a good way to start. Finally, try to be reasonable in your expectations. Caring for a new baby after major surgery can be quite trying. Arrange for assistance at home to ensure that you get enough rest.  
 
POSTPARTUM CHECK You may call the office when you return home to set up a postpartum visit. Most patients will be seen at 6 weeks after delivery, but after a  or other circumstances you may be seen in 2 weeks or less. If you are discharged from the hospital with staples that must be removed, you will be asked to come in sooner. At your postpartum visit, a pelvic exam may be performed.   If you are having any problems or concerns, please do not hesitate to call. Once again our number is [phone]. MOOD CHANGES Significant hormonal changes occur in the days following delivery, and as a result, many women experience brief episodes of tearfulness or feeling \"blue. \"  These emotional swings may be made worse by lack of sleep and by the adjustments inherent in becoming a mother. For some women, these fluctuations are minor. For others, they are overwhelming; creating feelings of anxiety, depression, or the inability to cope. If you have difficulty functioning as a result of feeling down, or if the mood changes seem severe, do not improve, or result is thoughts of harming yourself or others CALL RIGHT AWAY. PERINEAL CARE The basic goals of perineal care are to prevent infection, to relieve pain and promote healing. Your stitches will dissolve in four to six weeks, and do not need to be removed. After urinating, please continue to clean with warm water from front to back. Please continue sitz baths as instructed twice a day for a week or as needed. Call the office if you see pus in the suture site, or have unusual or severe swelling or pain that seems to be getting worse. INCISION CARE If you had a , clean and dry the incision gently as you would the rest of your body. Washing over the area with soap and water, and showering are fine. If steri-strips are present they will gradually come off with time. Tub baths are permitted. You may experience numbness and burning in the area surrounding the incision which usually resolves gradually over the next several weeks or months. RETURN OF MENSTRUATION Your first menstrual period may occur as soon as four to six weeks after your delivery if you are not breast-feeding. If breast-feeding it is more difficult to predict when your first period will occur.   Even if you are not yet menstruating, you may be ovulating and it may be possible to conceive again. It is common for your first period after childbirth to be very heavy with an increased amount of cramping. BREASTS Breast-feeding Mothers: Colostrum is excreted in the first 24-72 hours. Mature breast milk will appear on the 2nd to 5th day. Engorgement may occur with the mature milk making your breasts feel warm and very full. Frequent feedings will make you more comfortable. Babies do not nurse on regular schedules. Nursing every 1 1/2 to 2 hours is normal and frequent feeding DOES NOT mean you are not making enough milk. To avoid nipple confusion, do not give bottles for the first 4 weeks. Growth spurts are common and may require more frequent feedings. This is the way baby increases your milk supply. During a growth spurt, you may feel you are feeding very frequently and that your breasts are \"empty. \"  Don't worry, your milk is produced by supply and demand so this increased frequency of feeding will increase your milk supply within 48 hours. Sore nipples may occur with frequent feedings and are sometimes also caused by improper latch. Check for a proper latch. Baby should have a wide open mouth. Use different positions at each feeding if possible. Express a small amount of colostrum or breast milk onto the sore area and leave bra flaps unlatched until dry. The lactation consultant at [facility] is available for outpatient consultation without charge. Call [phone] from Monday-Friday 9:00am- 3:00pm to arrange an outpatient appointment with her. Local [facility] and consultants may also be very helpful. If You Are Not Breast-feeding: You will experience swelling, engorgement and some milk production. There are no safe medications available to stop lactation.   Some remedies for engorgement include: wearing a tight bra, ice packs and cold green cabbage leaves placed between the breast and your bra.  Change these frequently. Tylenol or Motrin should help with the discomfort. SEXUAL ADJUSTMENTS We recommend that you wait at least four weeks before resuming sexual intercourse. A sore perineum, a demanding baby and fatigue will certainly affect your ability to enjoy lovemaking! A vaginal lubricant is recommended to help with any dryness. It is very important to remember that you will ovulate BEFORE your first period and can conceive. If you do not wish another pregnancy right away, please take precautions to avoid pregnancy. If you would like a prescription method of birth control, please discuss this with us at your 6 week visit. ELIMINATION We remind all postpartum patients that it may take a few days for your bowels to return to normal, especially if you had a long labor. For those who had C-sections or severe lacerations, we recommend that you use a stool softener twice daily for at least two weeks. Many stool softeners are over-the-counter. Colace (Docusate Sodium) is recommended. Bulk forming agents such as Metamucil or Fibercon may be used daily in addition to a stool softener to promote regular bowel movements. Eating fresh fruits and vegetables along with whole grains is helpful as well. Do not be afraid to have a bowel movement as your stitches will not \"come out\" in the course of having a bowel movement. Urination may be difficult due to soreness around the urethra, or as an after effect of epidural.  This is temporary and can be helped  by squirting water over the perineum or try going in the shower. Hemorrhoids are common after birth. Tucks pads, Anusol cream and avoiding constipation are helpful. If constipation does occur, you may take Milk of Magnesia or Senekot according to the package instructions. DANGER SIGNS! CALL WITHOUT DELAY IF YOU ARE EXPERIENCING ANY OF THE FOLLOWING: 
* Unusually heavy bleeding, soaking more than 1 or more pads in an hour. * Vaginal discharge with strong foul odor. * Fever of 101 or higher * Unusual pain or tenderness in the abdominal area. * If breasts are red, hot or have a painful lump. * Depression that persists longer than 1-2 weeks or is severe. * Any urinary frequency accompanied by urgency or pain. * A lump in leg or calf especially if painful, warm or red. We thank you for choosing us for your prenatal care and/or delivery. We wish you all happiness and health with your baby for his or her lifetime! Vidal Rincon MD 
 
  
  
  
PROTEIN LOUNGE Announcement We are excited to announce that we are making your provider's discharge notes available to you in PROTEIN LOUNGE. You will see these notes when they are completed and signed by the physician that discharged you from your recent hospital stay. If you have any questions or concerns about any information you see in PROTEIN LOUNGE, please call the Health Information Department where you were seen or reach out to your Primary Care Provider for more information about your plan of care. Introducing [facility]! Juan Pichardo introduces PROTEIN LOUNGE patient portal. Now you can access parts of your medical record, email your doctor's office, and request medication refills online. 1. In your internet browser, go to [email or web address] 2. Click on the First Time User? Click Here link in the Sign In box. You will see the New Member Sign Up page. 3. Enter your PROTEIN LOUNGE Access Code exactly as it appears below. You will not need to use this code after youve completed the sign-up process. If you do not sign up before the expiration date, you must request a new code. · PROTEIN LOUNGE Access Code: [email or web address] Expires: 2/18/2018 12:28 PM 
 
4. Enter the last four digits of your Social Security Number (xxxx) and Date of Birth (mm/dd/yyyy) as indicated and click Submit. You will be taken to the next sign-up page. 5. Create a HITbills ID. This will be your HITbills login ID and cannot be changed, so think of one that is secure and easy to remember. 6. Create a HITbills password. You can change your password at any time. 7. Enter your Password Reset Question and Answer. This can be used at a later time if you forget your password. 8. Enter your e-mail address. You will receive e-mail notification when new information is available in [address] 9. Click Sign Up. You can now view and download portions of your medical record. 10. Click the Download Summary menu link to download a portable copy of your medical information. If you have questions, please visit the Frequently Asked Questions section of the HITbills website. Remember, HITbills is NOT to be used for urgent needs. For medical emergencies, dial 911. Now available from your iPhone and Android! Providers Seen During Your Hospitalization Provider Specialty Primary office phone Thomas Summers MD Obstetrics & Gynecology [phone] Your Primary Care Physician (PCP) Primary Care Physician Office Phone Office Fax [facility] [phone] You are allergic to the following No active allergies Recent Documentation Height Weight Breastfeeding? BMI OB Status Smoking Status 1.778 m 74.8 kg Unknown 23.68 kg/m2 Pregnant Never Smoker Emergency Contacts Name Discharge Info Relation Home Work Mobile Supa Swanson DISCHARGE CAREGIVER [3] Spouse [3] [phone] Patient Belongings The following personal items are in your possession at time of discharge: 
  Dental Appliances: None  Visual Aid: None      Home Medications: None   Jewelry: Ring  Clothing: At bedside    Other Valuables: Cell Phone Discharge Instructions Attachments/References DEPRESSION: POSTPARTUM (ENGLISH) BABY-FRIENDLY BREASTFEEDING: GENERAL INFO (ENGLISH) Patient Handouts Depression After Childbirth: Care Instructions Your Care Instructions Many women get the \"baby blues\" during the first few days after childbirth. You may lose sleep, feel irritable, and cry easily. You may feel happy one minute and sad the next. Hormone changes are one cause of these emotional changes. Also, the demands of a new baby, along with visits from relatives or other family needs, add to a mother's stress. The \"baby blues\" often peak around the fourth day. Then they ease up in less than 2 weeks. If your moodiness or anxiety lasts for more than 2 weeks, or if you feel like life is not worth living, you may have postpartum depression. This is different for each mother. Some mothers with serious depression may worry intensely about their infant's well-being. Others may feel distant from their child. Some mothers might even feel that they might harm their baby. A mother may have signs of paranoia, wondering if someone is watching her. Depression is not a sign of weakness. It is a medical condition that requires treatment. Medicine and counseling often work well to reduce depression. Talk to your doctor about taking antidepressant medicine while breastfeeding. Follow-up care is a key part of your treatment and safety. Be sure to make and go to all appointments, and call your doctor if you are having problems. It's also a good idea to know your test results and keep a list of the medicines you take. How do you know if you are depressed? With all the changes in your life, you may not know if you are depressed. Pregnancy sometimes causes changes in how you feel that are similar to the symptoms of depression. Symptoms of depression include: · Feeling sad or hopeless and losing interest in daily activities. These are the most common symptoms of depression. · Sleeping too much or not enough. · Feeling tired. You may feel as if you have no energy. · Eating too much or too little. · Writing or talking about death, such as writing suicide notes or talking about guns, knives, or pills. Keep the numbers for these national suicide hotlines: 2-819-216-TALK (5-568-779-166.623.6102) and 9-863-WJELSKG (8-369.372.9086). If you or someone you know talks about suicide or feeling hopeless, get help right away. How can you care for yourself at home? · Be safe with medicines. Take your medicines exactly as prescribed. Call your doctor if you think you are having a problem with your medicine. · Eat a healthy diet so that you can keep up your energy. · Get regular daily exercise, such as walks, to help improve your mood. · Get as much sunlight as possible. Keep your shades and curtains open. Get outside as much as you can. · Avoid using alcohol or other substances to feel better. · Get as much rest and sleep as possible. Avoid doing too much. Being too tired can increase depression. · Play stimulating music throughout your day and soothing music at night. · Schedule outings and visits with friends and family. Ask them to call you regularly, so that you do not feel alone. · Ask for help with preparing food and other daily tasks. Family and friends are often happy to help a mother with a . · Be honest with yourself and those who care about you. Tell them about your struggle. · Join a support group of new mothers. No one can better understand the challenges of caring for a  than other new mothers. · If you feel like life is not worth living or are feeling hopeless, get help right away. Keep the numbers for these national suicide hotlines: -TALK (0-974-314-451-791-5912) and 5-090-QHMGYJP (5-319-304-708-987-6942). When should you call for help? Call 911 anytime you think you may need emergency care. For example, call if: 
? · You feel you cannot stop from hurting yourself, your baby, or someone else. ?Call your doctor now or seek immediate medical care if: ? · You are having trouble caring for yourself or your baby. ? · You hear voices. ? Watch closely for changes in your health, and be sure to contact your doctor if: 
? · You have problems with your depression medicine. ? · You do not get better as expected. Where can you learn more? Go to http://rodney-lambert.info/. Enter Q392 in the search box to learn more about \"Depression After Childbirth: Care Instructions. \" Current as of: May 12, 2017 Content Version: 11.4 © 5705-0943 [employer]. Care instructions adapted under license by [employer] (which disclaims liability or warranty for this information). If you have questions about a medical condition or this instruction, always ask your healthcare professional. [address] any warranty or liability for your use of this information. Learning About Starting to Breastfeed Planning ahead Before your baby is born, plan ahead. Learn all you can about breastfeeding. This helps make breastfeeding easier. · Early in your pregnancy, talk to your doctor or midwife about breastfeeding. · Learn the basics before your baby is born. The staff at hospitals and birthing centers can help you find a lactation specialist. This person is often a nurse who has been trained to teach and advise women about breastfeeding. Or you can take a breastfeeding class. · Plan ahead for times when you will need help after your baby is born. Many women get help from friends and family. Some join a support group to talk to other moms who breastfeed. · Buy the equipment you'll need. Examples are breast pads, nipple cream, extra pillows, and nursing bras. Find out about breast pumps too. Getting help from your hospital or birthing center It's important to have support from the doctors, nurses, and hospital staff who care for you and your baby.  Before it's time for you to give birth, ask about the breastfeeding policies at your hospital or birthing center. Look for a hospital or birthing center that has policies for: · \"Rooming in. \" This policy encourages you to have your baby in the room with you. It can allow you to breastfeed more often. · Supplemental feedings. Tell the staff that your baby is to get only your breast milk from birth. If staff feed your baby water, sugar solution, or formula right after birth without a medical reason, it may make it harder for you to breastfeed. · Pacifiers or artificial nipples. Staff should not give your  these items without your permission. They may interfere with breastfeeding. · Follow-up. Find out if your hospital can help you with breastfeeding issues after you go home. See if you can get information on support groups or other contacts. They might help if you need help setting up and staying with your breastfeeding routine. Your first feeding It's best to start breastfeeding within 1 hour of birth. For each feeding, you go through these basic steps: · Get ready for the feeding. Be calm and relaxed, and try not to be distracted. Get some water or juice for yourself. Use two or three pillows to help support your baby while he or she is nursing. · Find a breastfeeding position that is comfortable for you and your baby. Examples are the cradle and the football positions. Make sure the baby's head and chest are lined up straight and facing your breast. It's best to switch which breast you start with each time. · Get the baby latched on well. Your baby's mouth needs to be wide open, like a yawn, so you may need to gently touch the middle of your baby's lower lip. When your baby's mouth is open wide, quickly bring the baby onto your nipple and areola. The areola is the dark [place] around your nipple. · Provide a complete feeding. Let your baby nurse for at least 15 minutes.  Be sure to burp your baby after each breast. 
 In the first days after birth, your breasts make a thick, yellow liquid called colostrum. This liquid gives your baby nutrients and antibodies against infection. It is all that babies need at first. Your breasts will fill with milk a few days after the birth. Talk to your doctor, midwife, or lactation specialist right away if you are having problems and aren't sure what to do. How often to breastfeed Plan to breastfeed your baby on demand rather than setting a strict schedule. For the first few days, be prepared to breastfeed every 1 to 3 hours. That often works out to about 8 to 12 times in a 24-hour period. Wake a sleeping baby to feed, if you need to. If you breastfeed more often, it will help your breasts to produce more milk. After you go home After you're home, don't be afraid to call your doctor, midwife, or lactation specialist with questions. That's true even if you don't know what's bothering you. They are used to parents of newborns calling. They can help you figure out if there is a problem, and if so, how to fix it. Plan for times when you will be apart from your baby. Use a breast pump to collect breast milk ahead of time. You can store milk in the refrigerator or freezer. Then it's ready when someone else will be taking care of your baby. Breastfeeding is a learned skill that gets easier over time. You are more likely to succeed if you plan ahead, learn the basic techniques, and know where to get help and support. Where can you learn more? Go to [email or web address]-lambert.[email or web address] Enter K730 in the search box to learn more about \"Learning About Starting to Breastfeed. \" Current as of: March 16, 2017 Content Version: 11.4 © 9896-8953 [employer]wise, [employer]. Care instructions adapted under license by [employer] (which disclaims liability or warranty for this information).  If you have questions about a medical condition or this instruction, always ask your healthcare professional. [address] any warranty or liability for your use of this information. Please provide this summary of care documentation to your next provider. Signatures-by signing, you are acknowledging that this After Visit Summary has been reviewed with you and you have received a copy. Patient Signature:  ____________________________________________________________ Date:  ____________________________________________________________  
  
Melissa Wallace Provider Signature:  ____________________________________________________________ Date:  ____________________________________________________________

## 2017-11-26 NOTE — PROGRESS NOTES
Bedside and Verbal shift change report given to SIMONA Moreno (oncoming nurse) by Jeremiah Maya RN (offgoing nurse). Report included the following information SBAR, Kardex, Intake/Output, MAR and Recent Results.

## 2017-11-26 NOTE — ROUTINE PROCESS
Dr Akua Springer @ bedside, pt breathing with ucs,. 0150 Iv infusing, Pt requesting Epidural waiting on Labs.   0205 Anaesthesia paged for Epidural.   0212 in room  0214 Time out done,   0223 test dose  0228 Continous infusion going. Pt comfortable  0247 Marks inserted, Srom with presenting part. 0504 dr Akua Springer @ bedside, pt instructed on pushing.  0304  VMI OVER INTACT PERINEUM. bABY SKIN TO SKIN.  050 0Feeling the need to void,l leg still heavy, can wait. 0540 Oob to bathroom, voided, svetlana care done, feeling light headed when stood to wash hands, Pt helped to wheelchair, back to bed. Color pale, awake alert. Iv reconnected. Will rest before transfere to [facility]bu   0635 Short nap, eat some crackers, drank some Ga feeling better  . 8824 Pad change, gown change, doing well, will transfere. 1566 Voided on transfer,no lightheadiness. Back to bed to attempt b/f.

## 2017-11-26 NOTE — PROCEDURES
[address] for Birth   Delivery Note    Patient's Name:  Will Reynolds MRN: [id number]. Patient's : 1980. Age: 42 y.o. Date of Service:  2017  3:25 AM    Physician:  Sarabjit Salazar MD    Delivered at 37w3d weeks gestation.     DELIVERY SUMMARY:  Information for the patient's :  Kristen Taveras [[id number]]     Delivery Type: Vaginal, Spontaneous Delivery   Delivery Date: 2017   Delivery Time: 3:22 AM     Birth Weight:       Sex:  male  Delivery Clinician:      Gestational Age: Gestational Age: 44w3d    Presentation: Vertex   Position: Right  Occiput  Anterior     Apgars were 8  and 9      Resuscitation Method: Tactile Stimulation;Suctioning-bulb;Umbilical Catheter     Meconium Stained: None  Living Status: Living       Placenta Date/Time:   3:19 AM   Placenta Removal: Spontaneous   Placenta Appearance: Vertex [1]     Cord Information: 3 Vessels    Cord Events: None       Cord Blood Sent?:  Yes    Blood Gases Sent?:  No     Episiotomy: no.  Laceration: no  Repair: no  Epidural: yes    Vital Signs and Lab Data:   Visit Vitals    Temp 98.2 °F (36.8 °C)    Breastfeeding Unknown       Temp (24hrs), Av.2 °F (36.8 °C), Min:98.2 °F (36.8 °C), Max:98.2 °F (36.8 °C)      WBC   Date/Time Value Ref Range Status   2017 01:20 AM 7.7 4.6 - 13.2 K/uL Final   2017 05:10 AM 6.4 4.6 - 13.2 K/uL Final   09/10/2017 02:45 PM 7.4 4.6 - 13.2 K/uL Final     HGB   Date/Time Value Ref Range Status   2017 01:20 AM 10.5 (L) 12.0 - 16.0 g/dL Final   2017 05:10 AM 10.4 (L) 12.0 - 16.0 g/dL Final   09/10/2017 02:45 PM 11.3 (L) 12.0 - 16.0 g/dL Final     PLATELET   Date/Time Value Ref Range Status   2017 01:20  135 - 420 K/uL Final       Prenatal Labs:  Lab Results   Component Value Date/Time    ABO/Rh(D) B POSITIVE 2017 01:20 AM    ABO,Rh B+ 2017    Rubella, External Immune 2017    GrBStrep, External Negative 10/27/2017 Plan: Normal Postpartum Care    Attending Attestation: I was present and scrubbed for the entire procedure    Signed By: Oc Waddell MD     November 26, 2017

## 2017-11-26 NOTE — ROUTINE PROCESS
Verbal shift change report given to AMEENA Slade (oncoming nurse) by SIMONA Craig RN (offgoing nurse). Report included the following information SBAR, Procedure Summary, Intake/Output, MAR and Recent Results.

## 2017-11-26 NOTE — ROUTINE PROCESS
TRANSFER - IN REPORT:    Verbal report received from AMEENA Loja RN(name) on [employer]  being received from L&D(unit) for routine progression of care      Report consisted of patients Situation, Background, Assessment and   Recommendations(SBAR). Information from the following report(s) SBAR, Kardex, Procedure Summary, Intake/Output, MAR and Recent Results] was reviewed with the receiving nurse. Opportunity for questions and clarification was provided. Assessment completed upon patients arrival to unit and care assumed.

## 2017-11-26 NOTE — IP AVS SNAPSHOT
Myrna Boswell 
 
 
 [address] [place] [address] Patient: Shruthi Cantrell 
MRN: [id number] My Medications Notice You have not been prescribed any medications.

## 2017-11-27 VITALS
SYSTOLIC BLOOD PRESSURE: 98 MMHG | TEMPERATURE: 98 F | OXYGEN SATURATION: 99 % | WEIGHT: 165 LBS | RESPIRATION RATE: 16 BRPM | DIASTOLIC BLOOD PRESSURE: 63 MMHG | BODY MASS INDEX: 23.62 KG/M2 | HEIGHT: 70 IN | HEART RATE: 75 BPM

## 2017-11-27 LAB
HCT VFR BLD AUTO: 27.9 % (ref 35–45)
HGB BLD-MCNC: 9 G/DL (ref 12–16)

## 2017-11-27 PROCEDURE — 85014 HEMATOCRIT: CPT | Performed by: SPECIALIST

## 2017-11-27 PROCEDURE — 85018 HEMOGLOBIN: CPT | Performed by: SPECIALIST

## 2017-11-27 PROCEDURE — 74011250637 HC RX REV CODE- 250/637: Performed by: SPECIALIST

## 2017-11-27 PROCEDURE — 36415 COLL VENOUS BLD VENIPUNCTURE: CPT | Performed by: SPECIALIST

## 2017-11-27 RX ADMIN — IBUPROFEN 800 MG: 400 TABLET, FILM COATED ORAL at 10:06

## 2017-11-27 RX ADMIN — ACETAMINOPHEN 650 MG: 325 TABLET, FILM COATED ORAL at 03:21

## 2017-11-27 RX ADMIN — IBUPROFEN 800 MG: 400 TABLET, FILM COATED ORAL at 01:57

## 2017-11-27 NOTE — ROUTINE PROCESS
Verbal shift change report given to AUNG Garibay RN (oncoming nurse) by [facility]. Abena Hu RN (offgoing nurse). Report included the following information SBAR, Procedure Summary, Intake/Output, MAR and Recent Results.

## 2017-11-27 NOTE — ROUTINE PROCESS
Discharge instructions reviewed with patient, understanding verbalized of all instructions given. Time given for questions, all questions answered. Copy of discharge paperwork provided to mom. Mom brought to front entrance in wheelchair, discharged in stable condition.

## 2017-11-27 NOTE — ROUTINE PROCESS
Bedside shift change report given to severino meyer rn (oncoming nurse) by lelia olivares rn (offgoing nurse). Report included the following information Kardex, Procedure Summary, Intake/Output, MAR and Recent Results.

## 2017-11-27 NOTE — LACTATION NOTE
Mother breast fed two other babies and since then has had breast implants. Mother states she did have breast changes during pregnancy and feel like her milk is coming in. Discussed possible impact on supply and the need to keep close count of diapers and supply. Experienced mother. She states the baby is latching and nursing well. Overall review and general discussion. Gave BF information, daily log and gel pads. Offered assistance if needed.

## 2017-11-27 NOTE — ADT AUTH CERT NOTES
Patient Demographics        Patient Name [address] Sex  Address Phone       Javier Soto [phone] Female 1980 [facility] [address] [address] [phone] (Mobile)           CSN:       [id number]           Admit Date: Admit Time Room Bed       2017 12:38 AM 3413 [[id number]] 01 [[id number]]           Attending Providers        Provider Pager From To       Sarabjit Salazar MD  17       Delivery Date: 2017   Delivery Time: 3:22 AM   Delivery Type: Vaginal, Spontaneous Delivery  Sex:  male    Gestational Age: 44w3d     [place] Measurements:  Birth Weight: 3.57 kg    Birth Length: 21\"          Delivery Clinician:     Living?:   Delivery Location: L&D

## 2017-11-27 NOTE — ANESTHESIA POSTPROCEDURE EVALUATION
Post-Anesthesia Evaluation and Assessment    Patient: Bishop Oviedo MRN: [id number]  SSN: [id number]    YOB: 1980  Age: 42 y.o. Sex: female       Cardiovascular Function/Vital Signs  Visit Vitals    BP 98/63 (BP 1 Location: Left arm, BP Patient Position: At rest)    Pulse 75    Temp 36.7 °C (98 °F)    Resp 16    Ht 5' 10\" (1.778 m)    Wt 74.8 kg (165 lb)    SpO2 99%    Breastfeeding Unknown    BMI 23.68 kg/m2       Patient is status post epidural anesthesia for * No procedures listed *. Nausea/Vomiting: None    Postoperative hydration reviewed and adequate. Pain:  Pain Scale 1: Numeric (0 - 10) (11/27/17 1003)  Pain Intensity 1: 4 (11/27/17 1003)   Managed    Neurological Status:   Neuro (WDL): Within Defined Limits (11/26/17 1535)   At baseline    Mental Status and Level of Consciousness: Alert and oriented     Pulmonary Status:   O2 Device: Room air (11/27/17 0437)   Adequate oxygenation and airway patent    Complications related to anesthesia: None    Post-anesthesia assessment completed.  No concerns    Signed By: Ami Roberto CRNA     November 27, 2017

## 2017-11-27 NOTE — DISCHARGE INSTRUCTIONS

## 2017-11-27 NOTE — DISCHARGE SUMMARY
Obstetrical Discharge Summary     Name: Shantel Rice MRN: [id number]  SSN: [id number]    YOB: 1980  Age: 42 y.o. Sex: female      Admit Date: 2017    Discharge Date: 2017     Admitting Physician: Jing Noland MD     Attending Physician:  Jing Noland MD     Admission Diagnoses: labor  Pregnancy  Labor and Delivery Indication for Care    Discharge Diagnoses:   Information for the patient's :  Lizz Heath [[id number]]   Delivery of a 3.57 kg male infant via Vaginal, Spontaneous Delivery on 2017 at 3:22 AM  by . Apgars were 8 and 9. Additional Diagnoses:   Hospital Problems  Never Reviewed          Codes Class Noted POA    Pregnancy ICD-10-CM: Z34.90  ICD-9-CM: V22.2  2017 Unknown             Lab Results   Component Value Date/Time    Rubella, External Immune 2017    GrBStrep, External Negative 10/27/2017       Immunization(s):   Immunization History   Administered Date(s) Administered    DTaP 10/26/2017    Influenza Vaccine 2017        Labs:   Recent Results (from the past 24 hour(s))   HEMOGLOBIN    Collection Time: 17  5:55 AM   Result Value Ref Range    HGB 9.0 (L) 12.0 - 16.0 g/dL   HEMATOCRIT    Collection Time: 17  5:55 AM   Result Value Ref Range    HCT 27.9 (L) 35.0 - 45.0 %       Exam:  Chest is clear to auscultation. Fundus firm and nontender  Bowel sounds are normal  Legs are normal without tenderness, swelling, or redness    Hospital Course: Normal hospital course following the delivery. Patient Instructions: There are no discharge medications for this patient. Reference my discharge instructions.     Follow-up Appointments   Procedures    FOLLOW UP VISIT Appointment in: 6 Weeks     Standing Status:   Standing     Number of Occurrences:   1     Order Specific Question:   Appointment in     Answer:   6 Weeks        Signed By:  Arthur Lima MD     2017

## 2018-05-02 LAB
ANTIBODY SCREEN, EXTERNAL: NEGATIVE
CHLAMYDIA, EXTERNAL: NEGATIVE
HBSAG, EXTERNAL: NEGATIVE
HCT, EXTERNAL: 35.7
HGB, EXTERNAL: 11.3
HIV, EXTERNAL: NEGATIVE
N. GONORRHEA, EXTERNAL: NEGATIVE
PLATELET CNT,   EXTERNAL: 277
RPR, EXTERNAL: NEGATIVE
RUBELLA, EXTERNAL: NORMAL
TYPE, ABO & RH, EXTERNAL: NORMAL

## 2018-05-13 ENCOUNTER — HOSPITAL ENCOUNTER (EMERGENCY)
Age: 38
Discharge: HOME | End: 2018-05-13
Attending: EMERGENCY MEDICINE | Admitting: EMERGENCY MEDICINE
Payer: COMMERCIAL

## 2018-05-13 VITALS
TEMPERATURE: 98.3 F | WEIGHT: 150 LBS | BODY MASS INDEX: 21.47 KG/M2 | HEART RATE: 73 BPM | RESPIRATION RATE: 18 BRPM | OXYGEN SATURATION: 100 % | HEIGHT: 70 IN | SYSTOLIC BLOOD PRESSURE: 108 MMHG | DIASTOLIC BLOOD PRESSURE: 72 MMHG

## 2018-05-13 DIAGNOSIS — O46.90 VAGINAL BLEEDING IN PREGNANCY: ICD-10-CM

## 2018-05-13 DIAGNOSIS — O20.0 THREATENED ABORTION: Primary | ICD-10-CM

## 2018-05-13 LAB
AMORPH CRY URNS QL MICRO: ABNORMAL
APPEARANCE UR: CLEAR
BACTERIA URNS QL MICRO: NEGATIVE /HPF
BILIRUB UR QL: NEGATIVE
COLOR UR: YELLOW
EPITH CASTS URNS QL MICRO: ABNORMAL /LPF (ref 0–5)
GLUCOSE UR STRIP.AUTO-MCNC: NEGATIVE MG/DL
HGB UR QL STRIP: ABNORMAL
KETONES UR QL STRIP.AUTO: NEGATIVE MG/DL
LEUKOCYTE ESTERASE UR QL STRIP.AUTO: ABNORMAL
NITRITE UR QL STRIP.AUTO: NEGATIVE
PH UR STRIP: 6.5 [PH] (ref 5–8)
PROT UR STRIP-MCNC: NEGATIVE MG/DL
RBC #/AREA URNS HPF: ABNORMAL /HPF (ref 0–5)
SP GR UR REFRACTOMETRY: 1.01 (ref 1–1.03)
UROBILINOGEN UR QL STRIP.AUTO: 0.2 EU/DL (ref 0.2–1)
WBC URNS QL MICRO: ABNORMAL /HPF (ref 0–4)

## 2018-05-13 PROCEDURE — 81001 URINALYSIS AUTO W/SCOPE: CPT | Performed by: PHYSICIAN ASSISTANT

## 2018-05-13 PROCEDURE — 99283 EMERGENCY DEPT VISIT LOW MDM: CPT

## 2018-05-13 PROCEDURE — 87086 URINE CULTURE/COLONY COUNT: CPT | Performed by: EMERGENCY MEDICINE

## 2018-05-13 NOTE — ED PROVIDER NOTES
EMERGENCY DEPARTMENT HISTORY AND PHYSICAL EXAM    11:53 AM      Date: 2018  Patient Name: Beata Chamorro    History of Presenting Illness     Chief Complaint   Patient presents with    Vaginal Bleeding    Vaginal Discharge         History Provided By: Patient    Chief Complaint: Vaginal bleeding and discharge  Duration: Few Hours PTA  Timing:  Acute  Location: Vagina  Quality: brown, red  Severity: Mild  Modifying Factors: none reported  Associated Symptoms: denies any other associated signs or symptoms      Additional History (Context): Beata Chamorro is a 42 y.o. female with hx tubal ligation who is 12 wks gravid and presents with vaginal spotting starting this morning. Pt noticed red/brown discharge after removing Monistat. Denies cramping, abd pain.  A0. PCP: Eddie Jonas MD        Past History     Past Medical History:  History reviewed. No pertinent past medical history. Past Surgical History:  Past Surgical History:   Procedure Laterality Date    BREAST SURGERY PROCEDURE UNLISTED      HX BREAST AUGMENTATION Bilateral 2013    HX OTHER SURGICAL  2017    tubal reversal    HX TUBAL LIGATION         Family History:  History reviewed. No pertinent family history. Social History:  Social History   Substance Use Topics    Smoking status: Never Smoker    Smokeless tobacco: Never Used    Alcohol use No       Allergies:  No Known Allergies      Review of Systems       Review of Systems   Gastrointestinal: Negative for abdominal distention and abdominal pain. Genitourinary: Positive for vaginal bleeding and vaginal discharge. Negative for pelvic pain. All other systems reviewed and are negative.         Physical Exam     Visit Vitals    /72 (BP 1 Location: Right arm, BP Patient Position: At rest)    Pulse 73    Temp 98.3 °F (36.8 °C)    Resp 18    Ht 5' 10\" (1.778 m)    Wt 68 kg (150 lb)    SpO2 100%    BMI 21.52 kg/m2         Physical Exam   Constitutional: She is oriented to person, place, and time. She appears well-developed and well-nourished. No distress. HENT:   Head: Normocephalic and atraumatic. Mouth/Throat: Oropharynx is clear and moist.   Eyes: Conjunctivae and EOM are normal. Pupils are equal, round, and reactive to light. No scleral icterus. Neck: Normal range of motion. Neck supple. Cardiovascular: Normal rate, regular rhythm and normal heart sounds. No murmur heard. Pulmonary/Chest: Effort normal and breath sounds normal. No respiratory distress. Abdominal: Soft. Bowel sounds are normal. She exhibits no distension. There is no tenderness. Genitourinary:   Genitourinary Comments: Os closed   No blood in vaginal vault  Mild irritation to cervix   Musculoskeletal: She exhibits no edema. Lymphadenopathy:     She has no cervical adenopathy. Neurological: She is alert and oriented to person, place, and time. Coordination normal.   Skin: Skin is warm and dry. No rash noted. Psychiatric: She has a normal mood and affect. Her behavior is normal.   Nursing note and vitals reviewed.         Diagnostic Study Results     Labs -  Recent Results (from the past 12 hour(s))   URINALYSIS W/ RFLX MICROSCOPIC    Collection Time: 05/13/18 11:35 AM   Result Value Ref Range    Color YELLOW      Appearance CLEAR      Specific gravity 1.015 1.005 - 1.030      pH (UA) 6.5 5.0 - 8.0      Protein NEGATIVE  NEG mg/dL    Glucose NEGATIVE  NEG mg/dL    Ketone NEGATIVE  NEG mg/dL    Bilirubin NEGATIVE  NEG      Blood TRACE (A) NEG      Urobilinogen 0.2 0.2 - 1.0 EU/dL    Nitrites NEGATIVE  NEG      Leukocyte Esterase SMALL (A) NEG     URINE MICROSCOPIC ONLY    Collection Time: 05/13/18 11:35 AM   Result Value Ref Range    WBC 0 to 3 0 - 4 /hpf    RBC 4 to 10 0 - 5 /hpf    Epithelial cells 3+ 0 - 5 /lpf    Bacteria NEGATIVE  NEG /hpf    Amorphous Crystals 1+ (A) NEG       Radiologic Studies -   No orders to display         Medical Decision Making   I am the first provider for this patient. I reviewed the vital signs, available nursing notes, past medical history, past surgical history, family history and social history. Vital Signs-Reviewed the patient's vital signs. Pulse Oximetry Analysis -  100% on room air (Interpretation) nonhypoxic    Cardiac Monitor:  Rate: 73    Records Reviewed: Nursing Notes (Time of Review: 11:53 AM)    ED Course: Progress Notes, Reevaluation, and Consults:        Provider Notes (Medical Decision Making):   MDM  Number of Diagnoses or Management Options  Threatened :   Vaginal bleeding in pregnancy:   Diagnosis management comments:  with spotting after using monistat denies cramping Rh+ with fetal and cardiac activity by US no active bleeding noted by exam bimanuel not performed will dc home        Amount and/or Complexity of Data Reviewed  Clinical lab tests: reviewed and ordered          Procedures:     Bedside US  Date/Time: 2018 11:55 AM  Performed by: Eduardo Cordero  Authorized by: Eduardo Cordero     Verbal consent obtained: Yes    Given by:  Patient  Type of procedure: Focused pelvic ultrasound obstetrical  Indications:  Vaginal bleeding  Intrauterine Pregnancy:  Present  Fetal heart    FHR (bpm):  154  Fetal motion    Interpretation:  Live intrauterine pregnancy          Diagnosis     Clinical Impression:   1. Threatened     2.  Vaginal bleeding in pregnancy        Disposition: Discharge    Follow-up Information     Follow up With Details Comments Contact [facility] EMERGENCY DEPT  As needed, If symptoms worsen [address] [place] [address]    Mary Devries MD Schedule an appointment as soon as possible for a visit for ED follow up appointment  [address] [place], [place] [address]y 1  [phone]             Patient's Medications    No medications on file     _______________________________    Attestations:  Scribe Attestation     Francisco Chung acting as a scribe for and in the presence of Yonathan Montgomery MD      May 13, 2018 at 12:19 PM       Provider Attestation:      I personally performed the services described in the documentation, reviewed the documentation, as recorded by the scribe in my presence, and it accurately and completely records my words and actions.  May 13, 2018 at 12:19 PM - Yonathan Montgomery MD    _______________________________

## 2018-05-15 LAB
BACTERIA SPEC CULT: NORMAL
SERVICE CMNT-IMP: NORMAL

## 2018-08-02 ENCOUNTER — HOSPITAL ENCOUNTER (EMERGENCY)
Age: 38
Discharge: HOME | End: 2018-08-02
Attending: EMERGENCY MEDICINE | Admitting: EMERGENCY MEDICINE
Payer: COMMERCIAL

## 2018-08-02 VITALS
HEIGHT: 70 IN | OXYGEN SATURATION: 100 % | BODY MASS INDEX: 21.62 KG/M2 | DIASTOLIC BLOOD PRESSURE: 68 MMHG | SYSTOLIC BLOOD PRESSURE: 106 MMHG | WEIGHT: 151 LBS | HEART RATE: 78 BPM | RESPIRATION RATE: 18 BRPM | TEMPERATURE: 97.9 F

## 2018-08-02 DIAGNOSIS — M79.651 RIGHT THIGH PAIN: Primary | ICD-10-CM

## 2018-08-02 PROCEDURE — 99283 EMERGENCY DEPT VISIT LOW MDM: CPT

## 2018-08-03 NOTE — ED TRIAGE NOTES
Patient noticed a lump on right upper thigh today. Moderate pain. On modified bed rest for pregnancy at 23 weeks.

## 2018-08-03 NOTE — ED PROVIDER NOTES
EMERGENCY DEPARTMENT HISTORY AND PHYSICAL EXAM 
 
11:39 PM 
 
 
Date: 8/2/2018 Patient Name: Jeff Ku History of Presenting Illness Chief Complaint Patient presents with  
 Skin Problem History Provided By: Patient Chief Complaint: Leg swelling Duration:  A couple hours Timing:  Acute Location: Right thigh Quality: Aching and Throbbing Severity: Mild Modifying Factors: N/A Associated Symptoms: denies any other associated signs or symptoms Additional History (Context): Jeff Ku is a 42 y.o. female with No significant past medical history who presents to the ED with c/o acute, mild, swelling of right thigh onset a couple hours. Pt reports she gets Deana shots weekly to prevent pre-term labor and that she has noticed swelling on her right thigh this evening and is concerned as of of the side-effects of the injection is blood clots. States that the swelling is not appreciated at the injection site and that she is experiencing throbbing and aching at her swelling site. Pt is G6-P5 with healthy births. Denies smoking, EtOH and drug use. Denies any fever, chills, CP, SOB, nausea, vomiting, diarrhea, urinary sx and any associated signs or sx. No other sx or complaints at this time. PCP: Trudy Schumacher MD 
 
 
 
Past History Past Medical History: 
History reviewed. No pertinent past medical history. Past Surgical History: 
Past Surgical History:  
Procedure Laterality Date  BREAST SURGERY PROCEDURE UNLISTED  HX BREAST AUGMENTATION Bilateral 2013  HX OTHER SURGICAL  2017  
 tubal reversal  
 HX TUBAL LIGATION  2003 Family History: No family history on file. Social History: 
Social History Substance Use Topics  Smoking status: Never Smoker  Smokeless tobacco: Never Used  Alcohol use No  
 
 
Allergies: 
No Known Allergies Review of Systems Review of Systems Constitutional: Negative for chills and fever.   
HENT: Negative for trouble swallowing. Respiratory: Negative for shortness of breath. Cardiovascular: Negative for chest pain. Gastrointestinal: Negative for abdominal pain, diarrhea, nausea and vomiting. Genitourinary: Negative for difficulty urinating and dysuria. Musculoskeletal: Positive for myalgias. Negative for back pain and neck pain. Skin: Negative for wound. Positive for localized swelling on lateral right thigh Neurological: Negative for syncope. Psychiatric/Behavioral: Negative for behavioral problems. All other systems reviewed and are negative. Physical Exam  
 
Visit Vitals  /68 (BP 1 Location: Right arm, BP Patient Position: At rest)  Pulse 78  Temp 97.9 °F (36.6 °C)  Resp 18  Ht 5' 10\" (1.778 m)  Wt 68.5 kg (151 lb)  SpO2 100%  BMI 21.67 kg/m2 Physical Exam  
Constitutional: She is oriented to person, place, and time. She appears well-developed. No distress. well-appearing, nad HENT:  
Head: Normocephalic and atraumatic. Eyes: EOM are normal.  
Neck: Normal range of motion. Cardiovascular: Normal rate and intact distal pulses. Pulmonary/Chest: Effort normal and breath sounds normal. No respiratory distress. Abdominal: Soft. There is no tenderness. Musculoskeletal: Normal range of motion. She exhibits no edema. Mechanically stable, no swelling or palpable lump noted to right upper thigh Neurological: She is alert and oriented to person, place, and time. No focal deficits noted, pt ambulates without difficulty Skin: Skin is warm. No localized redness or warmth noted to right upper thigh Psychiatric: Her behavior is normal.  
Nursing note and vitals reviewed. Diagnostic Study Results Labs - No results found for this or any previous visit (from the past 12 hour(s)). Radiologic Studies - No orders to display Medical Decision Making I am the first provider for this patient.  
 
I reviewed the vital signs, available nursing notes, past medical history, past surgical history, family history and social history. Vital Signs-Reviewed the patient's vital signs. Provider Notes: MDM Number of Diagnoses or Management Options Right thigh pain:  
Diagnosis management comments: 43 yo  at 23 wks presents with 1-day h/o right thigh swelling. No trauma, no cp/sob, no other symptoms or complaints. Examination unremarkable with no abnormality palpated on my exam and ctab with no increased wob. DVT seems clinically unlikely and US not available at this facility at this time. Discussed poc for dc home, symptom management, follow-up for US, return precautions. Amount and/or Complexity of Data Reviewed Review and summarize past medical records: yes Patient Progress Patient progress: stable Procedures Diagnosis Clinical Impression: 1. Right thigh pain Disposition: Discharged Follow-up Information Follow up With Details Comments Contact Info Katie Watson MD Call in 2 days  [address] [facility] [address] [facility] [address] 
[phone] [facility] EMERGENCY DEPT Go to As needed, If symptoms worsen [address] 
[phone] There are no discharge medications for this patient. 
 
_______________________________ Attestations: 
Scribe Attestation Claudia Roman acting as a scribe for and in the presence of Jeff Goldstein MD     
2018 at 11:39 PM 
    
Provider Attestation:     
I personally performed the services described in the documentation, reviewed the documentation, as recorded by the scribe in my presence, and it accurately and completely records my words and actions. 2018 at 11:39 PM - Jeff Goldstein MD   
_______________________________

## 2018-09-04 ENCOUNTER — HOSPITAL ENCOUNTER (OUTPATIENT)
Age: 38
Discharge: HOME | End: 2018-09-04
Attending: SPECIALIST | Admitting: OBSTETRICS & GYNECOLOGY
Payer: COMMERCIAL

## 2018-09-04 VITALS
WEIGHT: 160 LBS | BODY MASS INDEX: 22.4 KG/M2 | HEIGHT: 71 IN | RESPIRATION RATE: 20 BRPM | TEMPERATURE: 98 F | HEART RATE: 78 BPM | SYSTOLIC BLOOD PRESSURE: 120 MMHG | DIASTOLIC BLOOD PRESSURE: 88 MMHG

## 2018-09-04 PROBLEM — O47.00 PRETERM UTERINE CONTRACTIONS: Status: ACTIVE | Noted: 2018-09-04

## 2018-09-04 LAB
APPEARANCE UR: CLEAR
BILIRUB UR QL: NEGATIVE
COLOR UR: YELLOW
FIBRONECTIN FETAL VAG QL: NEGATIVE
GLUCOSE UR QL STRIP.AUTO: NEGATIVE MG/DL
KETONES UR-MCNC: NEGATIVE MG/DL
LEUKOCYTE ESTERASE UR QL STRIP: NEGATIVE
NITRITE UR QL: NEGATIVE
PH UR: 7.5 [PH] (ref 5–9)
PROT UR QL: NEGATIVE MG/DL
RBC # UR STRIP: NEGATIVE /UL
SERVICE CMNT-IMP: NORMAL
SP GR UR: 1.02 (ref 1–1.02)
UROBILINOGEN UR QL: 0.2 EU/DL (ref 0.2–1)

## 2018-09-04 PROCEDURE — 99284 EMERGENCY DEPT VISIT MOD MDM: CPT

## 2018-09-04 PROCEDURE — 81003 URINALYSIS AUTO W/O SCOPE: CPT

## 2018-09-04 PROCEDURE — 82731 ASSAY OF FETAL FIBRONECTIN: CPT | Performed by: OBSTETRICS & GYNECOLOGY

## 2018-09-04 RX ORDER — CLINDAMYCIN PHOSPHATE 20 MG/G
1 CREAM VAGINAL
COMMUNITY
End: 2018-11-09

## 2018-09-04 RX ORDER — SODIUM CHLORIDE 0.9 % (FLUSH) 0.9 %
5-10 SYRINGE (ML) INJECTION EVERY 8 HOURS
Status: DISCONTINUED | OUTPATIENT
Start: 2018-09-04 | End: 2018-09-05 | Stop reason: HOSPADM

## 2018-09-04 RX ORDER — SODIUM CHLORIDE, SODIUM LACTATE, POTASSIUM CHLORIDE, CALCIUM CHLORIDE 600; 310; 30; 20 MG/100ML; MG/100ML; MG/100ML; MG/100ML
125 INJECTION, SOLUTION INTRAVENOUS CONTINUOUS
Status: CANCELLED | OUTPATIENT
Start: 2018-09-04

## 2018-09-04 RX ORDER — SODIUM CHLORIDE 0.9 % (FLUSH) 0.9 %
5-10 SYRINGE (ML) INJECTION AS NEEDED
Status: DISCONTINUED | OUTPATIENT
Start: 2018-09-04 | End: 2018-09-05 | Stop reason: HOSPADM

## 2018-09-04 NOTE — IP AVS SNAPSHOT
[address] Patient: Maria Teresa Masterson MRN: [id number] NZS:6/11/1857 A check ajay indicates which time of day the medication should be taken. My Medications Notice You have not been prescribed any medications.

## 2018-09-04 NOTE — IP AVS SNAPSHOT
[address] Patient: Pia Ruiz MRN: [id number] FLK: About your hospitalization You were admitted on:  2018 You last received care in the:  [address] [place] [address] You were discharged on:  2018 Why you were hospitalized Your primary diagnosis was:  Not on File Your diagnoses also included:   Uterine Contractions Follow-up Information None Discharge Orders None A check ajay indicates which time of day the medication should be taken. My Medications Notice You have not been prescribed any medications. Discharge Instructions Call Dr. Rubio Solid office in the morning to decide if bed rest is needed. Call sooner for any other concerns or questions. Introducing [facility]! Lianne Asif introduces [employer] patient portal. Now you can access parts of your medical record, email your doctor's office, and request medication refills online. 1. In your internet browser, go to [email or web address] 2. Click on the First Time User? Click Here link in the Sign In box. You will see the New Member Sign Up page. 3. Enter your [employer] Access Code exactly as it appears below. You will not need to use this code after youve completed the sign-up process. If you do not sign up before the expiration date, you must request a new code. · [employer] Access Code: [email or web address] Expires: 12/3/2018 10:19 PM 
 
4. Enter the last four digits of your Social Security Number (xxxx) and Date of Birth (mm/dd/yyyy) as indicated and click Submit. You will be taken to the next sign-up page. 5. Create a Savings.comt ID. This will be your [employer] login ID and cannot be changed, so think of one that is secure and easy to remember. 6. Create a Savings.comt password. You can change your password at any time. 7. Enter your Password Reset Question and Answer. This can be used at a later time if you forget your password. 8. Enter your e-mail address. You will receive e-mail notification when new information is available in [address] 9. Click Sign Up. You can now view and download portions of your medical record. 10. Click the Download Summary menu link to download a portable copy of your medical information. If you have questions, please visit the Frequently Asked Questions section of the [employer] website. Remember, [employer] is NOT to be used for urgent needs. For medical emergencies, dial 911. Now available from your iPhone and Android! Introducing Alvin Martinez As a [employer] patient, I wanted to make you aware of our electronic visit tool called Alvin Martinez. Peer60/RELEASEIF allows you to connect within minutes with a medical provider 24 hours a day, seven days a week via a mobile device or tablet or logging into a secure website from your computer. You can access Alvin Underwoodfin from anywhere in the [place]. A virtual visit might be right for you when you have a simple condition and feel like you just dont want to get out of bed, or cant get away from work for an appointment, when your regular [employer] provider is not available (evenings, weekends or holidays), or when youre out of town and need minor care. Electronic visits cost only $49 and if the Peer60/RELEASEIF provider determines a prescription is needed to treat your condition, one can be electronically transmitted to a nearby pharmacy*. Please take a moment to enroll today if you have not already done so. The enrollment process is free and takes just a few minutes. To enroll, please download the Peer60/RELEASEIF clementina to your tablet or phone, or visit [email or web address] to enroll on your computer.    
And, as an [address] [place] [address] patient with a [employer] account, the results of your visits will be scanned into your electronic medical record and your primary care provider will be able to view the scanned results. We urge you to continue to see your regular [facility] provider for your ongoing medical care. And while your primary care provider may not be the one available when you seek a Alvin Underwoodfin virtual visit, the peace of mind you get from getting a real diagnosis real time can be priceless. For more information on Alvin Axelajanenefin, view our Frequently Asked Questions (FAQs) at [email or web address]. Sincerely, 
 
Vira Agee MD 
Chief Medical Officer [address] *:  certain medications cannot be prescribed via [employer] Providers Seen During Your Hospitalization Provider Specialty Primary office phone Aidee Huber MD Obstetrics & Gynecology [phone] Your Primary Care Physician (PCP) Primary Care Physician Office Phone Office Fax Geoffrey Hernández [phone] You are allergic to the following No active allergies Recent Documentation OB Status Smoking Status Pregnant Never Smoker Emergency Contacts Name Discharge Info Relation Home Work Mobile Supa Swanson DISCHARGE CAREGIVER [3] Spouse [3] [phone] Patient Belongings The following personal items are in your possession at time of discharge: 
                             
 
  
  
Discharge Instructions Attachments/References PREGNANCY: FRANCISCO WAYNE CONTRACTIONS (ENGLISH) PREGNANCY: GENERAL INFO (ENGLISH) PREGNANCY: PRECAUTIONS (ENGLISH) Patient Handouts Alivia Rose Contractions: Care Instructions Your Care Instructions Francisco Wayne contractions prepare your uterus for labor. Think of them as a \"warm-up\" exercise that your body does.  You may begin to feel them between the 28th and 30th weeks of your pregnancy. But they start as early as the 20th week. Francisco Hidalgo contractions usually occur more often during the ninth month. They may go away when you are active and return when you rest. These contractions are like mild contractions of true labor, but they occur less often. (You feel fewer than 8 in an hour.) They don't cause your cervix to open. It may be hard for you to tell the difference between [facility] contractions and true labor, especially in your first pregnancy. Follow-up care is a key part of your treatment and safety. Be sure to make and go to all appointments, and call your doctor if you are having problems. It's also a good idea to know your test results and keep a list of the medicines you take. How can you care for yourself at home? · Try a warm bath to help relieve muscle tension and reduce pain. · Change positions every 30 minutes. Take breaks if you must sit for a long time. Get up and walk around. · Drink plenty of water, enough so that your urine is light yellow or clear like water. · Taking short walks may help you feel better. Your doctor needs to check any contractions that are getting stronger or closer together. Where can you learn more? Go to http://rodney-lambert.info/. Enter 388 [id number] in the search box to learn more about \"Mather Hidalgo Contractions: Care Instructions. \" Current as of: November 21, 2017 Content Version: 11.7 © 5938-[address]82 [address]. Care instructions adapted under license by [employer] (which disclaims liability or warranty for this information). If you have questions about a medical condition or this instruction, always ask your healthcare professional. [place] [address] any warranty or liability for your use of this information. Learning About Pregnancy Your Care Instructions Your health in the early weeks of your pregnancy is particularly important for your baby's health. Take good care of yourself. Anything you do that harms your body can also harm your baby. Make sure to go to all of your doctor appointments. Regular checkups will help keep you and your baby healthy. How can you care for yourself at home? Diet 
  · Eat a balanced diet. Make sure your diet includes plenty of beans, peas, and leafy green vegetables.  
  · Do not skip meals or go for many hours without eating. If you are nauseated, try to eat a small, healthy snack every 2 to 3 hours.  
  · Do not eat fish that has a high level of mercury, such as shark, swordfish, or mackerel. Do not eat more than one can of tuna each week.  
  · Drink plenty of fluids, enough so that your urine is light yellow or clear like water. If you have kidney, heart, or liver disease and have to limit fluids, talk with your doctor before you increase the amount of fluids you drink.  
  · Cut down on caffeine, such as coffee, tea, and cola.  
  · Do not drink alcohol, such as beer, wine, or hard liquor.  
  · Take a multivitamin that contains at least 400 micrograms (mcg) of folic acid to help prevent birth defects. Fortified cereal and whole wheat bread are good additional sources of folic acid.  
  · Increase the calcium in your diet. Try to drink a quart of skim milk each day. You may also take calcium supplements and choose foods such as cheese and yogurt.  
 Lifestyle 
  · Make sure you go to your follow-up appointments.  
  · Get plenty of rest. You may be unusually tired while you are pregnant.  
  · Get at least 30 minutes of exercise on most days of the week. Walking is a good choice. If you have not exercised in the past, start out slowly. Take several short walks each day.  
  · Do not smoke. If you need help quitting, talk to your doctor about stop-smoking programs. These can increase your chances of quitting for good.   · Do not touch cat feces or litter boxes. Also, wash your hands after you handle raw meat, and fully cook all meat before you eat it. Wear gloves when you work in the yard or garden, and wash your hands well when you are done. Cat feces, raw or undercooked meat, and contaminated dirt can cause an infection that may harm your baby or lead to a miscarriage.  
  · Do not use saunas or hot tubs. Raising your body temperature may harm your baby.  
  · Avoid chemical fumes, paint fumes, or poisons.  
  · Do not use illegal drugs or alcohol. Medicines 
  · Review all of your medicines with your doctor. Some of your routine medicines may need to be changed to protect your baby.  
  · Use acetaminophen (Tylenol) to relieve minor problems, such as a mild headache or backache or a mild fever with cold symptoms. Do not use nonsteroidal anti-inflammatory drugs (NSAIDs), such as ibuprofen (Advil, Motrin) or naproxen (Aleve), unless your doctor says it is okay.  
  · Do not take two or more pain medicines at the same time unless the doctor told you to. Many pain medicines have acetaminophen, which is Tylenol. Too much acetaminophen (Tylenol) can be harmful.  
  · Take your medicines exactly as prescribed. Call your doctor if you think you are having a problem with your medicine.  
 To manage morning sickness 
  · If you feel sick when you first wake up, try eating a small snack (such as crackers) before you get out of bed. Allow some time to digest the snack, and then get out of bed slowly.  
  · Do not skip meals or go for long periods without eating. An empty stomach can make nausea worse.  
  · Eat small, frequent meals instead of three large meals each day.  
  · Drink plenty of fluids. Sports drinks, such as Gatorade or Powerade, are good choices.  
  · Eat foods that are high in protein but low in fat.  
  · If you are taking iron supplements, ask your doctor if they are necessary. Iron can make nausea worse.   · Avoid any smells, such as coffee, that make you feel sick.  
  · Get lots of rest. Morning sickness may be worse when you are tired. Follow-up care is a key part of your treatment and safety. Be sure to make and go to all appointments, and call your doctor if you are having problems. It's also a good idea to know your test results and keep a list of the medicines you take. Where can you learn more? Go to http://rodney-lambert.info/. Enter I001 in the search box to learn more about \"Learning About Pregnancy. \" Current as of: 2017 Content Version: 11.7 © 4327-9580 [employer]. Care instructions adapted under license by [employer] (which disclaims liability or warranty for this information). If you have questions about a medical condition or this instruction, always ask your healthcare professional. [place] [address] any warranty or liability for your use of this information. Pregnancy Precautions: Care Instructions Your Care Instructions There is no sure way to prevent labor before your due date ( labor) or to prevent most other pregnancy problems. But there are things you can do to increase your chances of a healthy pregnancy. Go to your appointments, follow your doctor's advice, and take good care of yourself. Eat well, and exercise (if your doctor agrees). And make sure to drink plenty of water. Follow-up care is a key part of your treatment and safety. Be sure to make and go to all appointments, and call your doctor if you are having problems. It's also a good idea to know your test results and keep a list of the medicines you take. How can you care for yourself at home? · Make sure you go to your prenatal appointments. At each visit, your doctor will check your blood pressure. Your doctor will also check to see if you have protein in your urine.  High blood pressure and protein in urine are signs of preeclampsia. This condition can be dangerous for you and your baby. · Drink plenty of fluids, enough so that your urine is light yellow or clear like water. Dehydration can cause contractions. If you have kidney, heart, or liver disease and have to limit fluids, talk with your doctor before you increase the amount of fluids you drink. · Tell your doctor right away if you notice any symptoms of an infection, such as: ¨ Burning when you urinate. ¨ A foul-smelling discharge from your vagina. ¨ Vaginal itching. ¨ Unexplained fever. ¨ Unusual pain or soreness in your uterus or lower belly. · Eat a balanced diet. Include plenty of foods that are high in calcium and iron. ¨ Foods high in calcium include milk, cheese, yogurt, almonds, and broccoli. ¨ Foods high in iron include red meat, shellfish, poultry, eggs, beans, raisins, whole-grain bread, and leafy green vegetables. · Do not smoke. If you need help quitting, talk to your doctor about stop-smoking programs and medicines. These can increase your chances of quitting for good. · Do not drink alcohol or use illegal drugs. · Follow your doctor's directions about activity. Your doctor will let you know how much, if any, exercise you can do. · Ask your doctor if you can have sex. If you are at risk for early labor, your doctor may ask you to not have sex. · Take care to prevent falls. During pregnancy, your joints are loose, and your balance is off. Sports such as bicycling, skiing, or in-line skating can increase your risk of falling. And don't ride horses or motorcycles, dive, water ski, scuba dive, or parachute jump while you are pregnant. · Avoid getting very hot. Do not use saunas or hot tubs. Avoid staying out in the sun in hot weather for long periods. Take acetaminophen (Tylenol) to lower a high fever.  
· Do not take any over-the-counter or herbal medicines or supplements without talking to your doctor or pharmacist first. 
 When should you call for help? Call 911 anytime you think you may need emergency care. For example, call if: 
  · You passed out (lost consciousness).  
  · You have severe vaginal bleeding.  
  · You have severe pain in your belly or pelvis.  
  · You have had fluid gushing or leaking from your vagina and you know or think the umbilical cord is bulging into your vagina. If this happens, immediately get down on your knees so your rear end (buttocks) is higher than your head. This will decrease the pressure on the cord until help arrives. ·  
 Call your doctor now or seek immediate medical care if: 
  · You have signs of preeclampsia, such as: 
¨ Sudden swelling of your face, hands, or feet. ¨ New vision problems (such as dimness or blurring). ¨ A severe headache.  
  · You have any vaginal bleeding.  
  · You have belly pain or cramping.  
  · You have a fever.  
  · You have had regular contractions (with or without pain) for an hour. This means that you have 8 or more within 1 hour or 4 or more in 20 minutes after you change your position and drink fluids.  
  · You have a sudden release of fluid from your vagina.  
  · You have low back pain or pelvic pressure that does not go away.  
  · You notice that your baby has stopped moving or is moving much less than normal.  
 Watch closely for changes in your health, and be sure to contact your doctor if you have any problems. Where can you learn more? Go to http://rodney-lambert.info/. Enter [id number] in the search box to learn more about \"Pregnancy Precautions: Care Instructions. \" Current as of: November 21, 2017 Content Version: 11.7 © 9839-3980 [employer]. Care instructions adapted under license by [employer] (which disclaims liability or warranty for this information).  If you have questions about a medical condition or this instruction, always ask your healthcare professional. [employer] disclaims any warranty or liability for your use of this information. Please provide this summary of care documentation to your next provider. Signatures-by signing, you are acknowledging that this After Visit Summary has been reviewed with you and you have received a copy. Patient Signature:  ____________________________________________________________ Date:  ____________________________________________________________  
  
[facility] Provider Signature:  ____________________________________________________________ Date:  ____________________________________________________________

## 2018-09-05 NOTE — PROGRESS NOTES
Pt discharged home ambulating with belongings. Follow up instructions and pregnancy education given. Pt agrees to DC home now.

## 2018-09-05 NOTE — H&P
Obstetric Admission History and Physical 
 
Name: Hola Soliz MRN: [id number] SSN: [id number] YOB: 1980  Age: 42 y.o. Sex: female Subjective: Chief complaint:  contractions Faith Bishop is a 42 y.o.  female,  who presents at 29 weeks gestation with contractions. . On admission EFM strip is obtained and is Category 1. On admission membranes are intact She is here to r/out labor. Hx of cerclage . OB HISTORY 
G 8 P 2325 PAST MEDICAL HISTORY No past medical history on file. PAST SURGICAL HISTORY Past Surgical History:  
Procedure Laterality Date  BREAST SURGERY PROCEDURE UNLISTED  HX BREAST AUGMENTATION Bilateral 2013  HX OTHER SURGICAL  2017  
 tubal reversal  
 HX TUBAL LIGATION  18 cerclage SOCIAL HISTORY Social History Social History  Marital status:  Spouse name: N/A  
 Number of children: N/A  
 Years of education: N/A Occupational History  Not on file. Social History Main Topics  Smoking status: Never Smoker  Smokeless tobacco: Never Used  Alcohol use No  
 Drug use: No  
 Sexual activity: Yes  
  Partners: Male Other Topics Concern  Not on file Social History Narrative FAMILY HISTORY No family history on file. ANTEPARTUM HISTORY: Please note [facility] records available from Dr Ger Lewis for complete information ALLERGY: 
No Known Allergies HOME MEDICATIONS: 
Prior to Admission medications Not on File Review of Systems: A comprehensive review of systems was negative Objective: VITAL SIGNS: 
Stable Physical Exam: 
Patient without distress. Heart: Regular rate and rhythm Lung: clear to auscultation throughout lung fields, no wheezes, no rales, no rhonchi and normal respiratory effort Abdomen: soft, nontender External Genitalia: normal general appearance and normal general appearance without lesions Vagina: normal mucosa without prolapse or lesions and normal without tenderness, induration or masses Cervix:long, closed, strings of cerclage in place Extremities: extremities normal, atraumatic, no cyanosis or edema Neurologic: Grossly normal 
 
 
Assessment:  
 
1) 28w1d weeks Intrauterine Pregnancy . 2) Cerclage for short cervix 3) contractions ; q10min , irregular at home: not noted in the [address] 
4)Reassuring fetal status 5)cervix closed Plan:  
 
UA negative IV fluids while waiting on fFN Signed By:  Romeo Ellis MD   
 2018   
 
10: patient feeling better after bolus, no contractions . FFN negative. PTL precautions reviewed, she will call the office in the morning and discuss her work duties and make a plan with Dr Jeanie Victor

## 2018-09-05 NOTE — PROGRESS NOTES
Pt wheelchair in from ER c/o UCs since 0900 today when she was at [facility] office for her [address] injection for PTL. Has intact Cerclage with plan to remove it 10/31/2018. UCs improved but returned around 1300, this was her first day back at work since the Cerclage was placed. Clindamycin was 9/3/2018. Had a positive pregnancy test at her postpartum visit. Pt of Dr. Mariaelena Cody. Active FM. Denies vaginal bleeding or leaking of fluid. Next OB visit is 2018. .

## 2018-09-05 NOTE — DISCHARGE INSTRUCTIONS
Call Dr. Jeanie Victor office in the morning to decide if bed rest is needed. Call sooner for any other concerns or questions.

## 2018-09-26 LAB — GRBS, EXTERNAL: NEGATIVE

## 2018-09-30 ENCOUNTER — HOSPITAL ENCOUNTER (OUTPATIENT)
Age: 38
Setting detail: OBSERVATION
Discharge: HOME | End: 2018-09-30
Attending: SPECIALIST | Admitting: OBSTETRICS & GYNECOLOGY
Payer: COMMERCIAL

## 2018-09-30 VITALS
BODY MASS INDEX: 22.68 KG/M2 | SYSTOLIC BLOOD PRESSURE: 124 MMHG | TEMPERATURE: 98.5 F | HEART RATE: 71 BPM | WEIGHT: 162 LBS | HEIGHT: 71 IN | RESPIRATION RATE: 20 BRPM | DIASTOLIC BLOOD PRESSURE: 67 MMHG

## 2018-09-30 PROBLEM — O47.00 PRETERM CONTRACTIONS: Status: ACTIVE | Noted: 2018-09-30

## 2018-09-30 LAB
APPEARANCE UR: CLEAR
BILIRUB UR QL: NEGATIVE
COLOR UR: YELLOW
FIBRONECTIN FETAL VAG QL: NEGATIVE
GLUCOSE UR QL STRIP.AUTO: NEGATIVE MG/DL
KETONES UR-MCNC: NEGATIVE MG/DL
LEUKOCYTE ESTERASE UR QL STRIP: ABNORMAL
NITRITE UR QL: NEGATIVE
PH UR: 6.5 [PH] (ref 5–9)
PROT UR QL: NEGATIVE MG/DL
RBC # UR STRIP: ABNORMAL /UL
SERVICE CMNT-IMP: ABNORMAL
SP GR UR: 1.02 (ref 1–1.02)
UROBILINOGEN UR QL: 0.2 EU/DL (ref 0.2–1)

## 2018-09-30 PROCEDURE — 82731 ASSAY OF FETAL FIBRONECTIN: CPT | Performed by: OBSTETRICS & GYNECOLOGY

## 2018-09-30 PROCEDURE — 87077 CULTURE AEROBIC IDENTIFY: CPT | Performed by: OBSTETRICS & GYNECOLOGY

## 2018-09-30 PROCEDURE — 96372 THER/PROPH/DIAG INJ SC/IM: CPT

## 2018-09-30 PROCEDURE — 87186 SC STD MICRODIL/AGAR DIL: CPT | Performed by: OBSTETRICS & GYNECOLOGY

## 2018-09-30 PROCEDURE — 99218 HC RM OBSERVATION: CPT

## 2018-09-30 PROCEDURE — 81003 URINALYSIS AUTO W/O SCOPE: CPT

## 2018-09-30 PROCEDURE — 74011250636 HC RX REV CODE- 250/636: Performed by: OBSTETRICS & GYNECOLOGY

## 2018-09-30 PROCEDURE — 99285 EMERGENCY DEPT VISIT HI MDM: CPT

## 2018-09-30 PROCEDURE — 87086 URINE CULTURE/COLONY COUNT: CPT | Performed by: OBSTETRICS & GYNECOLOGY

## 2018-09-30 RX ORDER — BETAMETHASONE SODIUM PHOSPHATE AND BETAMETHASONE ACETATE 3; 3 MG/ML; MG/ML
12 INJECTION, SUSPENSION INTRA-ARTICULAR; INTRALESIONAL; INTRAMUSCULAR; SOFT TISSUE EVERY 24 HOURS
Status: DISCONTINUED | OUTPATIENT
Start: 2018-09-30 | End: 2018-10-01 | Stop reason: HOSPADM

## 2018-09-30 RX ADMIN — BETAMETHASONE SODIUM PHOSPHATE AND BETAMETHASONE ACETATE 12 MG: 3; 3 INJECTION, SUSPENSION INTRA-ARTICULAR; INTRALESIONAL; INTRAMUSCULAR at 22:31

## 2018-09-30 NOTE — IP AVS SNAPSHOT
[address] Patient: Brian Stock MRN: [id number] QFQ: About your hospitalization You were admitted on:  2018 You last received care in the:  [address] [place] [address] You were discharged on:  2018 Why you were hospitalized Your primary diagnosis was:  Not on File Your diagnoses also included:   Contractions Follow-up Information None Discharge Orders None A check ajay indicates which time of day the medication should be taken. My Medications ASK your doctor about these medications Instructions Each Dose to Equal  
 Morning Noon Evening Bedtime  
 clindamycin 2 % vaginal cream  
Commonly known as:  CLEOCIN Your last dose was: Your next dose is: Insert 1 Applicator into vagina nightly. Indications: once a week 1 Applicator MARY CARMEN (PF) 275 mg/1.1 mL Atin Generic drug:  hydroxyprogest(PF)(preg presv) Your last dose was: Your next dose is:    
   
   
 by SubCUTAneous route once. Indications: once a week on Tuesday Discharge Instructions Follow up at [address] [place] [address] tomorrow 10/1/18 at 10:30pm to receive your #2 dose of Betamethasone. Follow up with Dr. Christine Mendez as scheduled on Tuesday 10/2/18 in his office,  Call sooner for any concerns. Introducing [facility]! Trinity Haynes introduces [employer] patient portal. Now you can access parts of your medical record, email your doctor's office, and request medication refills online. 1. In your internet browser, go to [email or web address] 2. Click on the First Time User? Click Here link in the Sign In box. You will see the New Member Sign Up page. 3. Enter your [employer] Access Code exactly as it appears below.  You will not need to use this code after youve completed the sign-up process. If you do not sign up before the expiration date, you must request a new code. · [employer] Access Code: [email or web address] Expires: 12/3/2018 10:19 PM 
 
4. Enter the last four digits of your Social Security Number (xxxx) and Date of Birth (mm/dd/yyyy) as indicated and click Submit. You will be taken to the next sign-up page. 5. Create a [employer] ID. This will be your [employer] login ID and cannot be changed, so think of one that is secure and easy to remember. 6. Create a [employer] password. You can change your password at any time. 7. Enter your Password Reset Question and Answer. This can be used at a later time if you forget your password. 8. Enter your e-mail address. You will receive e-mail notification when new information is available in [address] 9. Click Sign Up. You can now view and download portions of your medical record. 10. Click the Download Summary menu link to download a portable copy of your medical information. If you have questions, please visit the Frequently Asked Questions section of the [employer] website. Remember, [employer] is NOT to be used for urgent needs. For medical emergencies, dial 911. Now available from your iPhone and Android! Introducing Alvin Martinez As a [employer] patient, I wanted to make you aware of our electronic visit tool called Alvin Martinez. [employer] 24/7 allows you to connect within minutes with a medical provider 24 hours a day, seven days a week via a mobile device or tablet or logging into a secure website from your computer. You can access Alvin Martinez from anywhere in the [place].  
 
A virtual visit might be right for you when you have a simple condition and feel like you just dont want to get out of bed, or cant get away from work for an appointment, when your regular [employer] provider is not available (evenings, weekends or holidays), or when youre out of town and need minor care. Electronic visits cost only $49 and if the [employer] 24/7 provider determines a prescription is needed to treat your condition, one can be electronically transmitted to a nearby pharmacy*. Please take a moment to enroll today if you have not already done so. The enrollment process is free and takes just a few minutes. To enroll, please download the [employer] 24/7 clementina to your tablet or phone, or visit [email or web address] to enroll on your computer. And, as an [address] [place] [address] patient with a [employer] account, the results of your visits will be scanned into your electronic medical record and your primary care provider will be able to view the scanned results. We urge you to continue to see your regular [employer] provider for your ongoing medical care. And while your primary care provider may not be the one available when you seek a [employer] virtual visit, the peace of mind you get from getting a real diagnosis real time can be priceless. For more information on [employer], view our Frequently Asked Questions (FAQs) at [email or web address]. Sincerely, 
 
Carolin Coelho MD 
Chief Medical Officer [address] *:  certain medications cannot be prescribed via [employer] Unresulted Labs-Please follow up with your PCP about these lab tests Order Current Status CULTURE, URINE In process Providers Seen During Your Hospitalization Provider Specialty Primary office phone Erna Lilly MD Obstetrics & Gynecology [phone] Your Primary Care Physician (PCP) Primary Care Physician Office Phone Office Fax [place] [phone] You are allergic to the following No active allergies Recent Documentation Height Weight BMI OB Status Smoking Status 1.791 m 73.5 kg 22.92 kg/m2 Pregnant Never Smoker Emergency Contacts Name Discharge Info Relation Home Work Mobile Supa Swanson DISCHARGE CAREGIVER [3] Spouse [3] [phone] Patient Belongings The following personal items are in your possession at time of discharge: 
                             
 
  
  
Discharge Instructions Attachments/References PREGNANCY: MKIE HIDALGO CONTRACTIONS (ENGLISH) PREGNANCY: GENERAL INFO (ENGLISH) PREGNANCY: PRECAUTIONS (ENGLISH) Patient Handouts Marrie Reaper Contractions: Care Instructions Your Care Instructions Mike Hidalgo contractions prepare your uterus for labor. Think of them as a \"warm-up\" exercise that your body does. You may begin to feel them between the 28th and 30th weeks of your pregnancy. But they start as early as the 20th week. Rincon Hidalgo contractions usually occur more often during the ninth month. They may go away when you are active and return when you rest. These contractions are like mild contractions of true labor, but they occur less often. (You feel fewer than 8 in an hour.) They don't cause your cervix to open. It may be hard for you to tell the difference between Marrie Reaper contractions and true labor, especially in your first pregnancy. Follow-up care is a key part of your treatment and safety. Be sure to make and go to all appointments, and call your doctor if you are having problems. It's also a good idea to know your test results and keep a list of the medicines you take. How can you care for yourself at home? · Try a warm bath to help relieve muscle tension and reduce pain. · Change positions every 30 minutes. Take breaks if you must sit for a long time. Get up and walk around. · Drink plenty of water, enough so that your urine is light yellow or clear like water. · Taking short walks may help you feel better. Your doctor needs to check any contractions that are getting stronger or closer together. Where can you learn more? Go to http://rodney-lambert.info/. Enter 986 [id number] [id number] in the search box to learn more about \"Francisco Hidalgo Contractions: Care Instructions. \" Current as of: November 21, 2017 Content Version: 11.7 © 0490-2240 [employer]. Care instructions adapted under license by [employer] (which disclaims liability or warranty for this information). If you have questions about a medical condition or this instruction, always ask your healthcare professional. [place] [address] any warranty or liability for your use of this information. Learning About Pregnancy Your Care Instructions Your health in the early weeks of your pregnancy is particularly important for your baby's health. Take good care of yourself. Anything you do that harms your body can also harm your baby. Make sure to go to all of your doctor appointments. Regular checkups will help keep you and your baby healthy. How can you care for yourself at home? Diet 
  · Eat a balanced diet. Make sure your diet includes plenty of beans, peas, and leafy green vegetables.  
  · Do not skip meals or go for many hours without eating. If you are nauseated, try to eat a small, healthy snack every 2 to 3 hours.  
  · Do not eat fish that has a high level of mercury, such as shark, swordfish, or mackerel. Do not eat more than one can of tuna each week.  
  · Drink plenty of fluids, enough so that your urine is light yellow or clear like water.  If you have kidney, heart, or liver disease and have to limit fluids, talk with your doctor before you increase the amount of fluids you drink.  
  · Cut down on caffeine, such as coffee, tea, and cola.  
  · Do not drink alcohol, such as beer, wine, or hard liquor.  
  · Take a multivitamin that contains at least 400 micrograms (mcg) of folic acid to help prevent birth defects. Fortified cereal and whole wheat bread are good additional sources of folic acid.  
  · Increase the calcium in your diet. Try to drink a quart of skim milk each day. You may also take calcium supplements and choose foods such as cheese and yogurt.  
 Lifestyle 
  · Make sure you go to your follow-up appointments.  
  · Get plenty of rest. You may be unusually tired while you are pregnant.  
  · Get at least 30 minutes of exercise on most days of the week. Walking is a good choice. If you have not exercised in the past, start out slowly. Take several short walks each day.  
  · Do not smoke. If you need help quitting, talk to your doctor about stop-smoking programs. These can increase your chances of quitting for good.  
  · Do not touch cat feces or litter boxes. Also, wash your hands after you handle raw meat, and fully cook all meat before you eat it. Wear gloves when you work in the yard or garden, and wash your hands well when you are done. Cat feces, raw or undercooked meat, and contaminated dirt can cause an infection that may harm your baby or lead to a miscarriage.  
  · Do not use saunas or hot tubs. Raising your body temperature may harm your baby.  
  · Avoid chemical fumes, paint fumes, or poisons.  
  · Do not use illegal drugs or alcohol. Medicines 
  · Review all of your medicines with your doctor. Some of your routine medicines may need to be changed to protect your baby.  
  · Use acetaminophen (Tylenol) to relieve minor problems, such as a mild headache or backache or a mild fever with cold symptoms. Do not use nonsteroidal anti-inflammatory drugs (NSAIDs), such as ibuprofen (Advil, Motrin) or naproxen (Aleve), unless your doctor says it is okay.  
  · Do not take two or more pain medicines at the same time unless the doctor told you to. Many pain medicines have acetaminophen, which is Tylenol. Too much acetaminophen (Tylenol) can be harmful.   · Take your medicines exactly as prescribed. Call your doctor if you think you are having a problem with your medicine.  
 To manage morning sickness 
  · If you feel sick when you first wake up, try eating a small snack (such as crackers) before you get out of bed. Allow some time to digest the snack, and then get out of bed slowly.  
  · Do not skip meals or go for long periods without eating. An empty stomach can make nausea worse.  
  · Eat small, frequent meals instead of three large meals each day.  
  · Drink plenty of fluids. Sports drinks, such as Gatorade or Powerade, are good choices.  
  · Eat foods that are high in protein but low in fat.  
  · If you are taking iron supplements, ask your doctor if they are necessary. Iron can make nausea worse.  
  · Avoid any smells, such as coffee, that make you feel sick.  
  · Get lots of rest. Morning sickness may be worse when you are tired. Follow-up care is a key part of your treatment and safety. Be sure to make and go to all appointments, and call your doctor if you are having problems. It's also a good idea to know your test results and keep a list of the medicines you take. Where can you learn more? Go to http://rodney-lambert.info/. Enter Y631 in the search box to learn more about \"Learning About Pregnancy. \" Current as of: 2017 Content Version: 11.7 © 4723-0926 [employer]. Care instructions adapted under license by [employer] (which disclaims liability or warranty for this information). If you have questions about a medical condition or this instruction, always ask your healthcare professional. [place] [address] any warranty or liability for your use of this information. Pregnancy Precautions: Care Instructions Your Care Instructions There is no sure way to prevent labor before your due date ( labor) or to prevent most other pregnancy problems. But there are things you can do to increase your chances of a healthy pregnancy. Go to your appointments, follow your doctor's advice, and take good care of yourself. Eat well, and exercise (if your doctor agrees). And make sure to drink plenty of water. Follow-up care is a key part of your treatment and safety. Be sure to make and go to all appointments, and call your doctor if you are having problems. It's also a good idea to know your test results and keep a list of the medicines you take. How can you care for yourself at home? · Make sure you go to your prenatal appointments. At each visit, your doctor will check your blood pressure. Your doctor will also check to see if you have protein in your urine. High blood pressure and protein in urine are signs of preeclampsia. This condition can be dangerous for you and your baby. · Drink plenty of fluids, enough so that your urine is light yellow or clear like water. Dehydration can cause contractions. If you have kidney, heart, or liver disease and have to limit fluids, talk with your doctor before you increase the amount of fluids you drink. · Tell your doctor right away if you notice any symptoms of an infection, such as: ¨ Burning when you urinate. ¨ A foul-smelling discharge from your vagina. ¨ Vaginal itching. ¨ Unexplained fever. ¨ Unusual pain or soreness in your uterus or lower belly. · Eat a balanced diet. Include plenty of foods that are high in calcium and iron. ¨ Foods high in calcium include milk, cheese, yogurt, almonds, and broccoli. ¨ Foods high in iron include red meat, shellfish, poultry, eggs, beans, raisins, whole-grain bread, and leafy green vegetables. · Do not smoke. If you need help quitting, talk to your doctor about stop-smoking programs and medicines. These can increase your chances of quitting for good. · Do not drink alcohol or use illegal drugs. · Follow your doctor's directions about activity. Your doctor will let you know how much, if any, exercise you can do. · Ask your doctor if you can have sex. If you are at risk for early labor, your doctor may ask you to not have sex. · Take care to prevent falls. During pregnancy, your joints are loose, and your balance is off. Sports such as bicycling, skiing, or in-line skating can increase your risk of falling. And don't ride horses or motorcycles, dive, water ski, scuba dive, or parachute jump while you are pregnant. · Avoid getting very hot. Do not use saunas or hot tubs. Avoid staying out in the sun in hot weather for long periods. Take acetaminophen (Tylenol) to lower a high fever. · Do not take any over-the-counter or herbal medicines or supplements without talking to your doctor or pharmacist first. 
When should you call for help? Call 911 anytime you think you may need emergency care. For example, call if: 
  · You passed out (lost consciousness).  
  · You have severe vaginal bleeding.  
  · You have severe pain in your belly or pelvis.  
  · You have had fluid gushing or leaking from your vagina and you know or think the umbilical cord is bulging into your vagina. If this happens, immediately get down on your knees so your rear end (buttocks) is higher than your head. This will decrease the pressure on the cord until help arrives. ·  
 Call your doctor now or seek immediate medical care if: 
  · You have signs of preeclampsia, such as: 
¨ Sudden swelling of your face, hands, or feet. ¨ New vision problems (such as dimness or blurring). ¨ A severe headache.  
  · You have any vaginal bleeding.  
  · You have belly pain or cramping.  
  · You have a fever.  
  · You have had regular contractions (with or without pain) for an hour. This means that you have 8 or more within 1 hour or 4 or more in 20 minutes after you change your position and drink fluids.   · You have a sudden release of fluid from your vagina.  
  · You have low back pain or pelvic pressure that does not go away.  
  · You notice that your baby has stopped moving or is moving much less than normal.  
 Watch closely for changes in your health, and be sure to contact your doctor if you have any problems. Where can you learn more? Go to http://rodney-lambert.info/. Enter [id number] in the search box to learn more about \"Pregnancy Precautions: Care Instructions. \" Current as of: November 21, 2017 Content Version: 11.7 © 0659-2997 [employer]. Care instructions adapted under license by [employer] (which disclaims liability or warranty for this information). If you have questions about a medical condition or this instruction, always ask your healthcare professional. [address] any warranty or liability for your use of this information. Please provide this summary of care documentation to your next provider. Signatures-by signing, you are acknowledging that this After Visit Summary has been reviewed with you and you have received a copy. Patient Signature:  ____________________________________________________________ Date:  ____________________________________________________________  
  
Lamberto Araujo Provider Signature:  ____________________________________________________________ Date:  ____________________________________________________________

## 2018-09-30 NOTE — IP AVS SNAPSHOT
[address] Patient: Silvesrte Steele MRN: [id number] JCY:0[birth year]89 A check ajay indicates which time of day the medication should be taken. My Medications ASK your doctor about these medications Instructions Each Dose to Equal  
 Morning Noon Evening Bedtime  
 clindamycin 2 % vaginal cream  
Commonly known as:  CLEOCIN Your last dose was: Your next dose is: Insert 1 Applicator into vagina nightly. Indications: once a week 1 Applicator MARY CARMEN (PF) 275 mg/1.1 mL Atin Generic drug:  hydroxyprogest(PF)(preg presv) Your last dose was: Your next dose is:    
   
   
 by SubCUTAneous route once. Indications: once a week on Tuesday

## 2018-09-30 NOTE — PROGRESS NOTES
Pt ambulated in from home c/o UCs starting at 1600 that have worsened 7/10 pain occurring every 5 min. Active FM. Denies vaginal bleeding or leaking fluid. Pt received her weekly [place] injection on Tuesday in Dr. Simon Conner office. Cerclage in place due to be removed 10/31/18 at 2pm.   .   31.5 weeks.

## 2018-10-01 ENCOUNTER — HOSPITAL ENCOUNTER (OUTPATIENT)
Age: 38
Discharge: HOME | End: 2018-10-01
Attending: SPECIALIST | Admitting: OBSTETRICS & GYNECOLOGY
Payer: COMMERCIAL

## 2018-10-01 VITALS
BODY MASS INDEX: 23.19 KG/M2 | SYSTOLIC BLOOD PRESSURE: 103 MMHG | WEIGHT: 162 LBS | TEMPERATURE: 98.1 F | HEART RATE: 76 BPM | DIASTOLIC BLOOD PRESSURE: 57 MMHG | RESPIRATION RATE: 16 BRPM | HEIGHT: 70 IN

## 2018-10-01 PROCEDURE — 99284 EMERGENCY DEPT VISIT MOD MDM: CPT

## 2018-10-01 PROCEDURE — 96372 THER/PROPH/DIAG INJ SC/IM: CPT

## 2018-10-01 PROCEDURE — 59025 FETAL NON-STRESS TEST: CPT

## 2018-10-01 PROCEDURE — 74011250636 HC RX REV CODE- 250/636: Performed by: OBSTETRICS & GYNECOLOGY

## 2018-10-01 RX ORDER — BETAMETHASONE SODIUM PHOSPHATE AND BETAMETHASONE ACETATE 3; 3 MG/ML; MG/ML
12 INJECTION, SUSPENSION INTRA-ARTICULAR; INTRALESIONAL; INTRAMUSCULAR; SOFT TISSUE ONCE
Status: COMPLETED | OUTPATIENT
Start: 2018-10-01 | End: 2018-10-01

## 2018-10-01 RX ADMIN — BETAMETHASONE SODIUM PHOSPHATE AND BETAMETHASONE ACETATE 12 MG: 3; 3 INJECTION, SUSPENSION INTRA-ARTICULAR; INTRALESIONAL; INTRAMUSCULAR at 21:47

## 2018-10-01 NOTE — PROGRESS NOTES
Antepartum progress note    Patient reports feels ctxs, but talks through them and remains comfortable. Denies VB/LOF.  +FM. Patient Vitals for the past 8 hrs:   Temp Pulse Resp BP   09/30/18 1952 98.5 °F (36.9 °C) 71 20 124/67         Recent Results (from the past 24 hour(s))   FETAL FIBRONECTIN    Collection Time: 09/30/18  9:12 PM   Result Value Ref Range    Fetal fibronectin NEGATIVE  NEG     POC URINE MACROSCOPIC    Collection Time: 09/30/18  9:25 PM   Result Value Ref Range    Color YELLOW      Appearance CLEAR      Spec. gravity (POC) 1.020 1.001 - 1.023      pH, urine  (POC) 6.5 5.0 - 9.0      Protein (POC) NEGATIVE  NEG mg/dL    Glucose, urine (POC) NEGATIVE  NEG mg/dL    Ketones (POC) NEGATIVE  NEG mg/dL    Bilirubin (POC) NEGATIVE  NEG      Blood (POC) Trace Intact (A) NEG      Urobilinogen (POC) 0.2 0.2 - 1.0 EU/dL    Nitrite (POC) NEGATIVE  NEG      Leukocyte esterase (POC) TRACE (A) NEG      Performed by Duncan Lynn        Physical Exam:  Gen: A&O, NAD, resting comfortable in bed and during ctxs  Cervix:  Cl/50/hi, unchanged, cerclage in place, no blood    TOCO:  [id number], irregular pattern  FHTs:  130s, mod variability, +accels, no decels    FFN neg      Assessment/Plan:  Contractions more spaced out, cervix remains closed, FFN neg. Rx for Macrobid to treat UTI. Offered Rocephin, but pt would like to avoid any more shots. BMZ #1 given here, she will return tomorrow night for 2nd shot. Strict PTL precautions reviewed. Patient agrees with plan. DC home. She already has appt scheduled with Dr. Christine Mendez 10/2 and 10/3.       Linda Arteaga MD

## 2018-10-01 NOTE — IP AVS SNAPSHOT
[address] Patient: Davie Canada MRN: [id number] CMS:3/42/2456 A check ajay indicates which time of day the medication should be taken. My Medications ASK your doctor about these medications Instructions Each Dose to Equal  
 Morning Noon Evening Bedtime  
 clindamycin 2 % vaginal cream  
Commonly known as:  CLEOCIN Your last dose was: Your next dose is: Insert 1 Applicator into vagina nightly. Indications: once a week 1 Applicator MARY CARMEN (PF) 275 mg/1.1 mL Atin Generic drug:  hydroxyprogest(PF)(preg presv) Your last dose was: Your next dose is:    
   
   
 by SubCUTAneous route once. Indications: once a week on Tuesday

## 2018-10-01 NOTE — DISCHARGE INSTRUCTIONS
Follow up at [address] [place] [address] tomorrow 10/1/18 at 10:30pm to receive your #2 dose of Betamethasone. Follow up with Dr. Simon Conner as scheduled on Tuesday 10/2/18 in his office,  Call sooner for any concerns.

## 2018-10-01 NOTE — H&P
Obstetrical Problem History & Physical     Name: Derek Nelson MRN: [id number]  SSN: [id number]    YOB: 1980  Age: 34 y.o. Sex: female        Subjective:   Chief complaint:    Chief Complaint   Patient presents with    Contractions     39yo [id number] at 31.6wks with BENOIT 18 by 6.3wk US. Patient follows with Dr. Kayleen Frias for prenatal care, and had cerclage placed at [facility] 18 at [facility] for short cervix 1.9cm with h/o PTD. She has been receiving [place] weekly at Dr. Lobo Apgar office, last injection 5 days ago. Today she reports contractions q4-15min starting around 1600, irregular pattern. Denies VB/LOF. She denies recent intercourse or doing anything different today. +FM. +urinary frequency/urgency. Denies dysuria. OB HISTORY  OB History      Para Term  AB Living    8 5 1 3 1 5    SAB TAB Ectopic Molar Multiple Live Births     1    5      99 -  at [address], 6lb 14oz, F  00 -  at 30wks, 3lb 12oz, M  02 -  at 36wks, 6lb, M  03 -  at 36wks, 6lb, F  17 -  at 37.4wks, 8lb, M  SAB x2    PAST MEDICAL HISTORY  Denies    PAST SURGICAL HISTORY  Past Surgical History:   Procedure Laterality Date    HX BREAST AUGMENTATION Bilateral     HX OTHER SURGICAL      Right foot    HX OTHER SURGICAL      Tubal reversal    HX TUBAL LIGATION         SOCIAL HISTORY  Social History     Social History    Marital status:      Spouse name: Daniel Hughes Number of children: 5    Years of education: 15     Occupational History    Not on file. Social History Main Topics    Smoking status: Never Smoker    Smokeless tobacco: Never Used    Alcohol use No    Drug use: No    Sexual activity: Yes     Partners: Male     Other Topics Concern    Not on file     Social History Narrative   Denies tobacco, EtOH, illicit drug use.     FAMILY HISTORY  Hypertension - mother  Renal disease - father  Hyperthyroidism - mother ALLERGY:  NKDA    HOME MEDICATIONS:  Prior to Admission medications    Medication Sig Start Date End Date Taking? Authorizing Provider   clindamycin (CLEOCIN) 2 % vaginal cream Insert 1 Applicator into vagina nightly. Indications: once a week    Historical Provider   hydroxyprogest,PF,,preg presv, (MARY CARMEN, PF,) 275 mg/1.1 mL atIn by SubCUTAneous route once. Indications: once a week on Tuesday    Historical Provider        Review of Systems:  A comprehensive review of systems is negative other than stated in HPI. Denies fevers, chills, N/V/D, CP, SOB. Objective:   VITAL SIGNS:  Visit Vitals    /67 (BP 1 Location: Right arm, BP Patient Position: Sitting)    Pulse 71    Temp 98.5 °F (36.9 °C)    Resp 20    Ht 5' 10.5\" (1.791 m)    Wt 73.5 kg (162 lb)    BMI 22.92 kg/m2       Physical Exam:  Gen:  A&O, NAD, resting comfortably in bed. Abdomen: Gravid, nontender, soft  Back:  No CVA tenderness  Cervix: cl/50/hi, cerclage in place, no blood on exam  Extremities: nontender, no edema/redness, neg calf tenderness    TOCO:  q5-15min, irregular  FHTs: 130s, mod variability, +accels, no decels  BSUS:  Cephalic, anterior placenta, LILIAM 14.5    UA trace leuks/blood, neg protein/ketones    Please see prenatals for labs. Assessment/Plan:     16NN [id number] at 31.6wks with  contractions  - H/o PTD with cerclage in place, [place] weekly  - No signs of dehydration. Urine with trace leuks/blood. Concern for UTI. Pt has voided small amounts of urine 3 times since arrival.  Will send urine for a culture. - FFN collected. Will continue to monitor at this time, recheck cervix. Patient comfortable. - given h/o PTD and ctxs even though cervix closed, will give BMZ now and repeat in 24hrs. Pt reports has had before and agrees with plan.       Signed By:    Camilo Luther MD  2018 9:15 PM

## 2018-10-01 NOTE — PROGRESS NOTES
Dr. Dusty Diaz paged and returned call, updated on c/o UCs with intact Cerclage. She will be in to assess the pt. Aware recent hx asymptomatic UTI, pt is orally hydrating in an attempt to provide a urine sample.

## 2018-10-01 NOTE — PROGRESS NOTES
Pt ambulated to ER with belongings for DC home. Education and f/u handouts given. Pt agrees with discharge. She agrees to f/u tomorrow 10/1/18 at 2230 on L&D for #2 Betamethasone injection.

## 2018-10-01 NOTE — IP AVS SNAPSHOT
[address] Patient: Larwence Apgar MRN: [id number] XIJ: About your hospitalization You were admitted on:  2018 You last received care in the:  [address] [place] [address] You were discharged on:  2018 Why you were hospitalized Your primary diagnosis was:  Not on File Your diagnoses also included:  Pregnancy Follow-up Information None Discharge Orders None A check ajay indicates which time of day the medication should be taken. My Medications ASK your doctor about these medications Instructions Each Dose to Equal  
 Morning Noon Evening Bedtime  
 clindamycin 2 % vaginal cream  
Commonly known as:  CLEOCIN Your last dose was: Your next dose is: Insert 1 Applicator into vagina nightly. Indications: once a week 1 Applicator MARY CARMEN (PF) 275 mg/1.1 mL Atin Generic drug:  hydroxyprogest(PF)(preg presv) Your last dose was: Your next dose is:    
   
   
 by SubCUTAneous route once. Indications: once a week on Tuesday Discharge Instructions None Introducing [facility]! [facility] introduces [employer] Plus patient portal. Now you can access parts of your medical record, email your doctor's office, and request medication refills online. 1. In your internet browser, go to [email or web address] 2. Click on the First Time User? Click Here link in the Sign In box. You will see the New Member Sign Up page. 3. Enter your Galeno Plus Access Code exactly as it appears below. You will not need to use this code after youve completed the sign-up process. If you do not sign up before the expiration date, you must request a new code. · Galeno Plus Access Code: [email or web address] Expires: 12/3/2018 10:19 PM 
 
 4. Enter the last four digits of your Social Security Number (xxxx) and Date of Birth (mm/dd/yyyy) as indicated and click Submit. You will be taken to the next sign-up page. 5. Create a [employer] ID. This will be your [employer] login ID and cannot be changed, so think of one that is secure and easy to remember. 6. Create a [employer] password. You can change your password at any time. 7. Enter your Password Reset Question and Answer. This can be used at a later time if you forget your password. 8. Enter your e-mail address. You will receive e-mail notification when new information is available in [address] 9. Click Sign Up. You can now view and download portions of your medical record. 10. Click the Download Summary menu link to download a portable copy of your medical information. If you have questions, please visit the Frequently Asked Questions section of the [employer] website. Remember, [employer] is NOT to be used for urgent needs. For medical emergencies, dial 911. Now available from your iPhone and Android! Introducing Alvin Martinez As a [employer] patient, I wanted to make you aware of our electronic visit tool called Alvin UnderwoodPhotoShelter. [employer] 24/7 allows you to connect within minutes with a medical provider 24 hours a day, seven days a week via a mobile device or tablet or logging into a secure website from your computer. You can access Alvin Martinez from anywhere in the [place]. A virtual visit might be right for you when you have a simple condition and feel like you just dont want to get out of bed, or cant get away from work for an appointment, when your regular [employer] provider is not available (evenings, weekends or holidays), or when youre out of town and need minor care.   Electronic visits cost only $49 and if the Alvin Martinez provider determines a prescription is needed to treat your condition, one can be electronically transmitted to a nearby pharmacy*. Please take a moment to enroll today if you have not already done so. The enrollment process is free and takes just a few minutes. To enroll, please download the eSnips 24/7 clementina to your tablet or phone, or visit [email or web address] to enroll on your computer. And, as an [address] [place] [address] patient with a Yadio account, the results of your visits will be scanned into your electronic medical record and your primary care provider will be able to view the scanned results. We urge you to continue to see your regular eSnips provider for your ongoing medical care. And while your primary care provider may not be the one available when you seek a Haload virtual visit, the peace of mind you get from getting a real diagnosis real time can be priceless. For more information on Haload, view our Frequently Asked Questions (FAQs) at [email or web address]. Sincerely, 
 
Jose Elizabeth MD 
Chief Medical Officer [employer] *:  certain medications cannot be prescribed via Haload Providers Seen During Your Hospitalization Provider Specialty Primary office phone Trinity Redd MD Obstetrics & Gynecology [phone] Your Primary Care Physician (PCP) Primary Care Physician Office Phone Office Fax Cami Hubbard [phone] You are allergic to the following No active allergies Recent Documentation Height Weight BMI OB Status Smoking Status 1.778 m 73.5 kg 23.24 kg/m2 Pregnant Never Smoker Emergency Contacts Name Discharge Info Relation Home Work Mobile Supa Swanson DISCHARGE CAREGIVER [3] Spouse [3] [phone] Patient Belongings The following personal items are in your possession at time of discharge: Discharge Instructions Attachments/References PREGNANCY: WEEKS 32 TO 34 (ENGLISH)  LABOR (ENGLISH) PREGNANCY: KICK COUNTS (ENGLISH) Patient Handouts Weeks 32 to 34 of Your Pregnancy: Care Instructions Your Care Instructions During the last few weeks of your pregnancy, you may have more aches and pains. It's important to rest when you can. Your growing baby is putting more pressure on your bladder. So you may need to urinate more often. Hemorrhoids are also common. These are painful, itchy veins in the rectal area. In the 36th week, most women have a test for group B streptococcus (GBS). GBS is a common bacteria that can live in the vagina and rectum. It can make your baby sick after birth. If you test positive, you will get antibiotics during labor. These will keep your baby from getting the bacteria. You may want to talk with your doctor about banking your baby's umbilical cord blood. This is the blood left in the cord after birth. If you want to save this blood, you must arrange it ahead of time. You can't decide at the last minute. If you haven't already had the Tdap shot during this pregnancy, talk to your doctor about getting it. It will help protect your  against pertussis infection. Follow-up care is a key part of your treatment and safety. Be sure to make and go to all appointments, and call your doctor if you are having problems. It's also a good idea to know your test results and keep a list of the medicines you take. How can you care for yourself at home? Ease hemorrhoids · Get more liquids, fruits, vegetables, and fiber in your diet. This will help keep your stools soft. · Avoid sitting for too long. Lie on your left side several times a day. · Clean yourself with soft, moist toilet paper. Or you can use witch hazel pads or personal hygiene pads. · If you are uncomfortable, try ice packs.  Or you can sit in a warm sitz bath. Do these for 20 minutes at a time, as needed. · Use hydrocortisone cream for pain and itching. Two examples are Anusol and Preparation H Hydrocortisone. · Ask your doctor about taking an over-the-counter stool softener. Consider breastfeeding · Experts recommend that women breastfeed for 1 year or longer. Breast milk is the perfect food for babies. · Breast milk is easier for babies to digest than formula. And it is always available, just the right temperature, and free. · Breast milk may help protect your child from some health problems.  babies are less likely than formula-fed babies to: ¨ Get ear infections, colds, diarrhea, and pneumonia. ¨ Be obese or get diabetes later in life. · Women who breastfeed have less bleeding after the birth. Their uteruses also shrink back faster. · Some women who breastfeed lose weight faster. Making milk burns calories. · Breastfeeding can lower your risk of breast cancer, ovarian cancer, and osteoporosis. Decide about circumcision for boys · As you make this decision, it may help to think about your personal, Yarsanism, and family traditions. You get to decide if you will keep your son's penis natural or if he will be circumcised. · If you decide that you would like to have your baby circumcised, talk with your doctor. You can share your concerns about pain. And you can discuss your preferences for anesthesia. Where can you learn more? Go to http://rodney-lambert.info/. Enter Y734 in the search box to learn more about \"Weeks 32 to 34 of Your Pregnancy: Care Instructions. \" Current as of: November 21, 2017 Content Version: 11.7 © 6432-3347 [employer]. Care instructions adapted under license by [employer] (which disclaims liability or warranty for this information).  If you have questions about a medical condition or this instruction, always ask your healthcare professional. [employer] disclaims any warranty or liability for your use of this information.  Labor: Care Instructions Your Care Instructions  labor is the start of labor between 20 and 36 weeks of pregnancy. A full-term pregnancy lasts 37 to 42 weeks. In labor, the uterus contracts to open the cervix. This is the first stage of childbirth.  labor can be caused by a problem with the baby, the mother, or both. Often the cause is not known. In some cases, doctors use medicines to try to delay labor until 29 or more weeks of pregnancy. By this time, a baby has grown enough so that problems are not likely. In some cases-such as with a serious infection-it is healthier for the baby to be born early. Your treatment will depend on how far along you are in your pregnancy and on your health and your baby's health. Follow-up care is a key part of your treatment and safety. Be sure to make and go to all appointments, and call your doctor if you are having problems. It's also a good idea to know your test results and keep a list of the medicines you take. How can you care for yourself at home? · If your doctor prescribed medicines, take them exactly as directed. Call your doctor if you think you are having a problem with your medicine. · Rest until your doctor advises you about activity. He or she will tell you if you should stay in bed most of the time. You may need to arrange for  if you have young children. · Do not have sexual intercourse unless your doctor says it is safe. · Use pads, not tampons, if you have vaginal bleeding. · Make sure to drink plenty of fluids. Dehydration can lead to contractions. If you have kidney, heart, or liver disease and have to limit fluids, talk with your doctor before you increase the amount of fluids you drink. · Do not smoke or allow others to smoke around you.  If you need help quitting, talk to your doctor about stop-smoking programs and medicines. These can increase your chances of quitting for good. When should you call for help? Call 911 anytime you think you may need emergency care. For example, call if: 
  · You passed out (lost consciousness).  
  · You have severe vaginal bleeding.  
  · You have severe pain in your belly or pelvis.  
  · You have had fluid gushing or leaking from your vagina and you know or think the umbilical cord is bulging into your vagina. If this happens, immediately get down on your knees so your rear end (buttocks) is higher than your head. This will decrease the pressure on the cord until help arrives.  
[facility] your doctor now or seek immediate medical care if: 
  · You have signs of preeclampsia, such as: 
¨ Sudden swelling of your face, hands, or feet. ¨ New vision problems (such as dimness or blurring). ¨ A severe headache.  
  · You have any vaginal bleeding.  
  · You have belly pain or cramping.  
  · You have a fever.  
  · You have had regular contractions (with or without pain) for an hour. This means that you have 6 or more within 1 hour after you change your position and drink fluids.  
  · You have a sudden release of fluid from the vagina.  
  · You have low back pain or pelvic pressure that does not go away.  
  · You notice that your baby has stopped moving or is moving much less than normal.  
 Watch closely for changes in your health, and be sure to contact your doctor if you have any problems. Where can you learn more? Go to http://rodney-lambert.info/. Enter Q400 in the search box to learn more about \" Labor: Care Instructions. \" Current as of: 2017 Content Version: 11.7 © 4040-0590 [employer]. Care instructions adapted under license by [employer] (which disclaims liability or warranty for this information).  If you have questions about a medical condition or this instruction, always ask your healthcare professional. [address] any warranty or liability for your use of this information. Counting Your Baby's Kicks: Care Instructions Your Care Instructions Counting your baby's kicks is one way your doctor can tell that your baby is healthy. Most women-especially in a first pregnancy-feel their baby move for the first time between 16 and 22 weeks. The movement may feel like flutters rather than kicks. Your baby may move more at certain times of the day. When you are active, you may notice less kicking than when you are resting. At your prenatal visits, your doctor will ask whether the baby is active. In your last trimester, your doctor may ask you to count the number of times you feel your baby move. Follow-up care is a key part of your treatment and safety. Be sure to make and go to all appointments, and call your doctor if you are having problems. It's also a good idea to know your test results and keep a list of the medicines you take. How do you count fetal kicks? · A common method of checking your baby's movement is to count the number of kicks or moves you feel in 1 hour. Ten movements (such as kicks, flutters, or rolls) in 1 hour are normal. Some doctors suggest that you count in the morning until you get to 10 movements. Then you can quit for that day and start again the next day. · Pick your baby's most active time of day to count. This may be any time from morning to evening. · If you do not feel 10 movements in an hour, your baby may be sleeping. Wait for the next hour and count again. When should you call for help? Call your doctor now or seek immediate medical care if: 
  · You noticed that your baby has stopped moving or is moving much less than normal.  
 Watch closely for changes in your health, and be sure to contact your doctor if you have any problems. Where can you learn more? Go to http://rodney-lambert.info/. Enter U460 in the search box to learn more about \"Counting Your Baby's Kicks: Care Instructions. \" Current as of: November 21, 2017 Content Version: 11.7 © 7633-1283 [employer]. Care instructions adapted under license by [employer] (which disclaims liability or warranty for this information). If you have questions about a medical condition or this instruction, always ask your healthcare professional. [address] any warranty or liability for your use of this information. Please provide this summary of care documentation to your next provider. Signatures-by signing, you are acknowledging that this After Visit Summary has been reviewed with you and you have received a copy. Patient Signature:  ____________________________________________________________ Date:  ____________________________________________________________  
  
[facility] Provider Signature:  ____________________________________________________________ Date:  ____________________________________________________________

## 2018-10-02 NOTE — PROGRESS NOTES
Pt arrived ambulatory from [facility] for BMZ #2 and NST. Denies headache, dizziness, SOB, vaginal bleeding, LOF, vaginal discharge, pain. Reports intermittent contractions, but this is consistent with her baseline.  with Dr Alda Felty, [facility]. Cervical cerclage in place per pt, hx PTD.     : Discharge instructions provided, EFM removed. : Pt ambulatory to [facility] with family and belongings at side. In NAD and comfortable. Continues to deny above s/sx. Care relinquished.

## 2018-10-03 LAB
BACTERIA SPEC CULT: ABNORMAL
BACTERIA SPEC CULT: ABNORMAL
SERVICE CMNT-IMP: ABNORMAL

## 2018-10-27 ENCOUNTER — HOSPITAL ENCOUNTER (INPATIENT)
Age: 38
LOS: 1 days | Discharge: HOME | DRG: 818 | End: 2018-10-27
Attending: OBSTETRICS & GYNECOLOGY | Admitting: SPECIALIST
Payer: COMMERCIAL

## 2018-10-27 VITALS — DIASTOLIC BLOOD PRESSURE: 69 MMHG | TEMPERATURE: 98.5 F | SYSTOLIC BLOOD PRESSURE: 118 MMHG | HEART RATE: 77 BPM

## 2018-10-27 PROBLEM — Z37.9 NORMAL LABOR: Status: ACTIVE | Noted: 2018-10-27

## 2018-10-27 LAB
ABO + RH BLD: NORMAL
APPEARANCE UR: CLEAR
BASOPHILS # BLD: 0 K/UL (ref 0–0.1)
BASOPHILS NFR BLD: 0 % (ref 0–2)
BILIRUB UR QL: NEGATIVE
BLOOD GROUP ANTIBODIES SERPL: NORMAL
COLOR UR: YELLOW
DIFFERENTIAL METHOD BLD: ABNORMAL
EOSINOPHIL # BLD: 0.3 K/UL (ref 0–0.4)
EOSINOPHIL NFR BLD: 4 % (ref 0–5)
ERYTHROCYTE [DISTWIDTH] IN BLOOD BY AUTOMATED COUNT: 13.6 % (ref 11.6–14.5)
GLUCOSE UR QL STRIP.AUTO: NEGATIVE MG/DL
HCT VFR BLD AUTO: 37.5 % (ref 35–45)
HGB BLD-MCNC: 12 G/DL (ref 12–16)
KETONES UR-MCNC: NEGATIVE MG/DL
LEUKOCYTE ESTERASE UR QL STRIP: ABNORMAL
LYMPHOCYTES # BLD: 1.2 K/UL (ref 0.9–3.6)
LYMPHOCYTES NFR BLD: 16 % (ref 21–52)
MCH RBC QN AUTO: 26.2 PG (ref 24–34)
MCHC RBC AUTO-ENTMCNC: 32 G/DL (ref 31–37)
MCV RBC AUTO: 81.9 FL (ref 74–97)
MONOCYTES # BLD: 0.5 K/UL (ref 0.05–1.2)
MONOCYTES NFR BLD: 7 % (ref 3–10)
NEUTS SEG # BLD: 5.4 K/UL (ref 1.8–8)
NEUTS SEG NFR BLD: 73 % (ref 40–73)
NITRITE UR QL: NEGATIVE
PH UR: 6.5 [PH] (ref 5–9)
PLATELET # BLD AUTO: 169 K/UL (ref 135–420)
PMV BLD AUTO: 12.2 FL (ref 9.2–11.8)
PROT UR QL: ABNORMAL MG/DL
RBC # BLD AUTO: 4.58 M/UL (ref 4.2–5.3)
RBC # UR STRIP: ABNORMAL /UL
SERVICE CMNT-IMP: ABNORMAL
SP GR UR: >1.03 (ref 1–1.02)
SPECIMEN EXP DATE BLD: NORMAL
UROBILINOGEN UR QL: 0.2 EU/DL (ref 0.2–1)
WBC # BLD AUTO: 7.4 K/UL (ref 4.6–13.2)

## 2018-10-27 PROCEDURE — 96360 HYDRATION IV INFUSION INIT: CPT

## 2018-10-27 PROCEDURE — 0UCC7ZZ EXTIRPATION OF MATTER FROM CERVIX, VIA NATURAL OR ARTIFICIAL OPENING: ICD-10-PCS | Performed by: OBSTETRICS & GYNECOLOGY

## 2018-10-27 PROCEDURE — 59025 FETAL NON-STRESS TEST: CPT

## 2018-10-27 PROCEDURE — 96374 THER/PROPH/DIAG INJ IV PUSH: CPT

## 2018-10-27 PROCEDURE — 65270000029 HC RM PRIVATE

## 2018-10-27 PROCEDURE — 96365 THER/PROPH/DIAG IV INF INIT: CPT

## 2018-10-27 PROCEDURE — 99285 EMERGENCY DEPT VISIT HI MDM: CPT

## 2018-10-27 PROCEDURE — 74011000258 HC RX REV CODE- 258: Performed by: OBSTETRICS & GYNECOLOGY

## 2018-10-27 PROCEDURE — 81003 URINALYSIS AUTO W/O SCOPE: CPT

## 2018-10-27 PROCEDURE — 85025 COMPLETE CBC W/AUTO DIFF WBC: CPT | Performed by: OBSTETRICS & GYNECOLOGY

## 2018-10-27 PROCEDURE — 86900 BLOOD TYPING SEROLOGIC ABO: CPT | Performed by: OBSTETRICS & GYNECOLOGY

## 2018-10-27 PROCEDURE — 96361 HYDRATE IV INFUSION ADD-ON: CPT

## 2018-10-27 PROCEDURE — 74011250636 HC RX REV CODE- 250/636: Performed by: OBSTETRICS & GYNECOLOGY

## 2018-10-27 RX ORDER — BUTORPHANOL TARTRATE 1 MG/ML
2 INJECTION INTRAMUSCULAR; INTRAVENOUS
Status: DISCONTINUED | OUTPATIENT
Start: 2018-10-27 | End: 2018-10-27 | Stop reason: HOSPADM

## 2018-10-27 RX ORDER — CARBOPROST TROMETHAMINE 250 UG/ML
250 INJECTION, SOLUTION INTRAMUSCULAR
Status: DISCONTINUED | OUTPATIENT
Start: 2018-10-27 | End: 2018-10-27 | Stop reason: HOSPADM

## 2018-10-27 RX ORDER — NALBUPHINE HYDROCHLORIDE 10 MG/ML
10 INJECTION, SOLUTION INTRAMUSCULAR; INTRAVENOUS; SUBCUTANEOUS
Status: DISCONTINUED | OUTPATIENT
Start: 2018-10-27 | End: 2018-10-27 | Stop reason: HOSPADM

## 2018-10-27 RX ORDER — OXYTOCIN/RINGER'S LACTATE 20/1000 ML
125 PLASTIC BAG, INJECTION (ML) INTRAVENOUS CONTINUOUS
Status: DISCONTINUED | OUTPATIENT
Start: 2018-10-27 | End: 2018-10-27 | Stop reason: HOSPADM

## 2018-10-27 RX ORDER — SODIUM CHLORIDE, SODIUM LACTATE, POTASSIUM CHLORIDE, CALCIUM CHLORIDE 600; 310; 30; 20 MG/100ML; MG/100ML; MG/100ML; MG/100ML
125 INJECTION, SOLUTION INTRAVENOUS CONTINUOUS
Status: DISCONTINUED | OUTPATIENT
Start: 2018-10-27 | End: 2018-10-27 | Stop reason: HOSPADM

## 2018-10-27 RX ORDER — HYDROMORPHONE HYDROCHLORIDE 1 MG/ML
1 INJECTION, SOLUTION INTRAMUSCULAR; INTRAVENOUS; SUBCUTANEOUS
Status: DISCONTINUED | OUTPATIENT
Start: 2018-10-27 | End: 2018-10-27 | Stop reason: HOSPADM

## 2018-10-27 RX ORDER — TERBUTALINE SULFATE 1 MG/ML
0.25 INJECTION SUBCUTANEOUS
Status: DISCONTINUED | OUTPATIENT
Start: 2018-10-27 | End: 2018-10-27 | Stop reason: HOSPADM

## 2018-10-27 RX ORDER — OXYTOCIN/RINGER'S LACTATE 20/1000 ML
500 PLASTIC BAG, INJECTION (ML) INTRAVENOUS ONCE
Status: DISCONTINUED | OUTPATIENT
Start: 2018-10-27 | End: 2018-10-27 | Stop reason: HOSPADM

## 2018-10-27 RX ORDER — OXYTOCIN 10 [USP'U]/ML
10 INJECTION, SOLUTION INTRAMUSCULAR; INTRAVENOUS
Status: DISCONTINUED | OUTPATIENT
Start: 2018-10-27 | End: 2018-10-27 | Stop reason: HOSPADM

## 2018-10-27 RX ORDER — MISOPROSTOL 200 UG/1
800 TABLET ORAL
Status: DISCONTINUED | OUTPATIENT
Start: 2018-10-27 | End: 2018-10-27 | Stop reason: HOSPADM

## 2018-10-27 RX ORDER — LIDOCAINE HYDROCHLORIDE 10 MG/ML
20 INJECTION, SOLUTION EPIDURAL; INFILTRATION; INTRACAUDAL; PERINEURAL AS NEEDED
Status: DISCONTINUED | OUTPATIENT
Start: 2018-10-27 | End: 2018-10-27 | Stop reason: HOSPADM

## 2018-10-27 RX ORDER — METHYLERGONOVINE MALEATE 0.2 MG/ML
0.2 INJECTION INTRAVENOUS AS NEEDED
Status: DISCONTINUED | OUTPATIENT
Start: 2018-10-27 | End: 2018-10-27 | Stop reason: HOSPADM

## 2018-10-27 RX ORDER — ONDANSETRON 2 MG/ML
4 INJECTION INTRAMUSCULAR; INTRAVENOUS
Status: DISCONTINUED | OUTPATIENT
Start: 2018-10-27 | End: 2018-10-27 | Stop reason: HOSPADM

## 2018-10-27 RX ADMIN — SODIUM CHLORIDE, SODIUM LACTATE, POTASSIUM CHLORIDE, AND CALCIUM CHLORIDE 125 ML/HR: 600; 310; 30; 20 INJECTION, SOLUTION INTRAVENOUS at 10:30

## 2018-10-27 RX ADMIN — NALBUPHINE HYDROCHLORIDE 10 MG: 10 INJECTION, SOLUTION INTRAMUSCULAR; INTRAVENOUS; SUBCUTANEOUS at 10:45

## 2018-10-27 RX ADMIN — SODIUM CHLORIDE 5 MILLION UNITS: 900 INJECTION INTRAVENOUS at 11:01

## 2018-10-27 NOTE — H&P
History & Physical 
 
Name: Iftikhar Awad MRN: [id number]  SSN: [id number] YOB: 1980  Age: 34 y.o. Sex: female Subjective:  
 
Estimated Date of Delivery: 18 OB History  Para Term  AB Living 8 5 2 3 2 5 SAB TAB Ectopic Molar Multiple Live Births 1       5 Tabernash Earing is a 40yo [id number] at 35.4 weeks presenting with  contractions. Started having contractions at 4am and reports increasing frequency. Denies vaginal bleeding or discharge. Had [place] cerclage placed at 20 weeks because of shortened cervical length. (done at [facility]) OB history significant for 3  deliveries. Has also been on iftikhar weekly this pregnancy. AMA and NIPT negative (XX) GBS reported in chart as \"normal\" Past Medical History:  
Diagnosis Date   delivery  Past Surgical History:  
Procedure Laterality Date  HX BREAST AUGMENTATION Bilateral   HX OTHER SURGICAL Right foot  HX OTHER SURGICAL Tubal reversal  
 HX TUBAL LIGATION   Social History Occupational History  Not on file Tobacco Use  Smoking status: Never Smoker  Smokeless tobacco: Never Used Substance and Sexual Activity  Alcohol use: No  
 Drug use: No  
 Sexual activity: Yes  
  Partners: Male No family history on file. No Known Allergies Prior to Admission medications Medication Sig Start Date End Date Taking? Authorizing Provider  
clindamycin (CLEOCIN) 2 % vaginal cream Insert 1 Applicator into vagina nightly. Indications: once a week    Provider, Historical  
hydroxyprogest,PF,,preg presv, (IFTIKHAR, PF,) 275 mg/1.1 mL atIn by SubCUTAneous route once. Indications: once a week on Tuesday    Provider, Historical  
  
 
Review of Systems: A comprehensive review of systems was negative except for that written in the HPI. Objective:  
 
Vitals: 
Vitals:  
 10/27/18 0846 BP: 100/57 Pulse: 77 Temp: 98.4 °F (36.9 °C) Labs: 
Recent Results (from the past 12 hour(s)) POC URINE MACROSCOPIC Collection Time: 10/27/18  9:16 AM  
Result Value Ref Range Color YELLOW Appearance CLEAR Spec. gravity (POC) >1.030 (H) 1.001 - 1.023  
 pH, urine  (POC) 6.5 5.0 - 9.0 Protein (POC) TRACE (A) NEG mg/dL Glucose, urine (POC) NEGATIVE  NEG mg/dL Ketones (POC) NEGATIVE  NEG mg/dL Bilirubin (POC) NEGATIVE  NEG Blood (POC) Trace Intact (A) NEG Urobilinogen (POC) 0.2 0.2 - 1.0 EU/dL Nitrite (POC) NEGATIVE  NEG Leukocyte esterase (POC) SMALL (A) NEG Performed by Yoli Kwon Physical Exam: 
Heart: RRR Lungs: Clear Abdomen: gravid, nontender Membranes:  Intact Fetal Heart Rate: Reactive TOCO q 5-6 Cervix: 1-2/50/-2 with cerclage knot palpated anterior cervix Prenatal Labs:  
Lab Results Component Value Date/Time  
 Rubella, External Immune 2018 GrBStrep, External Negative 10/27/2017 HBsAg, External Negative 2018 HIV, External Negative 2018 RPR, External Negative 2018 Gonorrhea, External Negative 2018 Chlamydia, External Negative 2018 Assessment/Plan:  
 
IUP at 35.4  labor Cerclage in place PCN for GBS as unknown Cerclage removal planned Signed By:  Andrea Gambino MD   
 2018

## 2018-10-27 NOTE — PROCEDURES
PROCEDURE NOTE      PREOP DIAGNOSIS:   1.  labor at 35.4 weeks  2. Butler Cerclage in place  POSTOP DIAGNOSIS: Same  PROCEDURE: Removal of cerclage  SURGEON: Loly Gomez MD  ASSISTANT: Sylvia Perez CNM  EBL: minimal cc  IVFluids:500cc  ANESTHESIA: IV sedation nubain  FINDINGS: Butler cerclage  DISPOSITION: stable to recovery    PROCEDURE:   Informed consent obtained for cerclage removal. Knot of Butler cerclage palpated anterior to cervix. Speculum placed in vagina. Knot seen anterior cervix. Scissors used to cut suture on left side of knot. Suture removed. Small bleeding anterior cervix controlled with pressure. Patient tolerated procedure well.     Loly Gomez MD  2018

## 2018-10-27 NOTE — PROGRESS NOTES
0930 Dr Dorinda Greenwood 354/7weeks black female via ambulatory with history cercage placement at 20weeks c/o contractions q10-15 minutes since 0430 this am. Denies any vaginal bleeding. No srom. Monitors applied reassuring fhr noted. toco readjusted after urine dipped. 0930 Dr Nacho Ward called concerning patient Given po hydration. 8350 Patient states \"all off asudden contractions getting worse\"1030 Dr Nacho Ward in room for cerclage removal. [phone] patient given iv sedation, for comfort with cerclage removal per order Dr Nacho Ward. 1055 Cerclage removed by Dr Nacho Ward. [phone] Dr Nacho Ward called  sve unchanged. 2/50/-2  1600 Iv removed monitors aoff patient discharged to home via w,c. Instructed on ptl precautions,and to follow-up with Dr Arsenio Zavaleta on Monday.

## 2018-10-27 NOTE — PROGRESS NOTES
Dr Cecilia Lazaro office records reviewed and GBS culture negative. Will stop antibiotics.  
Michaela Gil MD

## 2018-10-29 ENCOUNTER — HOSPITAL ENCOUNTER (OUTPATIENT)
Age: 38
Setting detail: OBSERVATION
Discharge: HOME | End: 2018-10-29
Attending: SPECIALIST | Admitting: OBSTETRICS & GYNECOLOGY
Payer: COMMERCIAL

## 2018-10-29 VITALS
BODY MASS INDEX: 24.05 KG/M2 | HEART RATE: 69 BPM | SYSTOLIC BLOOD PRESSURE: 109 MMHG | DIASTOLIC BLOOD PRESSURE: 60 MMHG | HEIGHT: 70 IN | RESPIRATION RATE: 16 BRPM | TEMPERATURE: 98 F | WEIGHT: 168 LBS | OXYGEN SATURATION: 100 %

## 2018-10-29 LAB
APPEARANCE UR: CLEAR
BILIRUB UR QL: NEGATIVE
COLOR UR: ABNORMAL
GLUCOSE UR QL STRIP.AUTO: NEGATIVE MG/DL
KETONES UR-MCNC: NEGATIVE MG/DL
LEUKOCYTE ESTERASE UR QL STRIP: NEGATIVE
NITRITE UR QL: NEGATIVE
PH UR: 7 [PH] (ref 5–9)
PROT UR QL: ABNORMAL MG/DL
RBC # UR STRIP: ABNORMAL /UL
SERVICE CMNT-IMP: ABNORMAL
SP GR UR: 1.02 (ref 1–1.02)
UROBILINOGEN UR QL: 0.2 EU/DL (ref 0.2–1)

## 2018-10-29 PROCEDURE — 59025 FETAL NON-STRESS TEST: CPT

## 2018-10-29 PROCEDURE — 99283 EMERGENCY DEPT VISIT LOW MDM: CPT

## 2018-10-29 PROCEDURE — 99218 HC RM OBSERVATION: CPT

## 2018-10-29 PROCEDURE — 81003 URINALYSIS AUTO W/O SCOPE: CPT

## 2018-10-29 NOTE — PROGRESS NOTES
G 8 p5 presenting with ucs since evening time that became closer and stronger. Pt states stopped around 0300,she slept for a while and woke 2 0430 with q 5 mins ucs. Pt had cerclage removed on Saturday because of ucs. 1390 ucs calmer now, cervix unchanged/the patient po hydrating 0615 Ucs spaced to q 14. Pt feels they are a little stronger 4698 Dr Chelsea Sanchez notified via SBAR, PTS UCS,,CERVIX AND REASURRING FHT, Monitor further hour, ,until checked by oncoming MD, Pt aware of POC. 0645 Resting some in between ucs. .UCs  Have spaced out since arrival but some feel stronger.

## 2018-10-29 NOTE — PROGRESS NOTES
0710: Introduced to patient as part of her healthcare team. Pt denies headaches, visual changes, epigastric pain, fluid leakage. Pt states her contraction pain level is 5/10. Pt states no pain between contraction. Palpation findings, uterus relaxed. 5782: Dr Vladimir Morris  At bedside, talking to patient. 0400: Dr Vladimir Morris performed cervical exam. Discharge orders received.

## 2018-10-29 NOTE — DISCHARGE SUMMARY
Isabelle Tellez MD  [address] [facility] [address]  [address] [facility]  [phone]     Labor and delivery screening visit    Name: Silvestre Steele MRN: [id number]  SSN: [id number]    YOB: 1980  Age: 34 y.o. Sex: female        Miriam Johansen is a 34 y.o.  female who is due 2018, by Other Basis. This makes her 35w6d. She  is being seen for   Chief Complaint   Patient presents with    Contractions   ,   OB History    Para Term  AB Living   8 5 1 3 1 5   SAB TAB Ectopic Molar Multiple Live Births     1       5      # Outcome Date GA Lbr Leon/2nd Weight Sex Delivery Anes PTL Lv   8 Current            7 Para 17     Vag-Spont   CHRISTINA   6 TAB            5   36w0d    Vag-Spont   CHRISTINA   4   36w0d    Vag-Spont   CHRISTINA   3   30w0d    Vag-Spont   CHRISTINA   2 Term  40w0d    Vag-Spont   CHRISTINA   1                    She is being seen in screening location 3420/01.       Historical Additional  Problem List.:   Problem List as of 10/29/2018 Never Reviewed          Codes Class Noted - Resolved    Labor abnormal ICD-10-CM: O62.9  ICD-9-CM: 661.90  10/29/2018 - Present        Normal labor ICD-10-CM: O80, Z37.9  ICD-9-CM: 685  10/27/2018 - Present         contractions ICD-10-CM: O47.9  ICD-9-CM: 644.00  2018 - Present         uterine contractions ICD-10-CM: O47.9  ICD-9-CM: 644.00  2018 - Present        Pregnancy ICD-10-CM: Z34.90  ICD-9-CM: V22.2  2017 - Present        Labor and delivery, indication for care ICD-10-CM: O75.9  ICD-9-CM: 659.90  2017 - Present        Pregnant and not yet delivered in third trimester ICD-10-CM: Z34.93  ICD-9-CM: V22.1  2017 - Present        Current pregnancy with history of  labor ICD-10-CM: O09.219  ICD-9-CM: V23.41  9/10/2017 - Present             No Known Allergies  Past Medical History:   Diagnosis Date     delivery      Past Surgical History:   Procedure Laterality Date    HX BREAST AUGMENTATION Bilateral 2013    HX OTHER SURGICAL      Right foot    HX OTHER SURGICAL      Tubal reversal    HX TUBAL LIGATION  2003     No family history on file. Social History     Socioeconomic History    Marital status:      Spouse name: Mallory Perez Number of children: 5    Years of education: 15    Highest education level: Not on file   Social Needs    Financial resource strain: Not on file    Food insecurity - worry: Not on file    Food insecurity - inability: Not on file   [employer] needs - medical: Not on file   [employer] needs - non-medical: Not on file   Occupational History    Not on file   Tobacco Use    Smoking status: Never Smoker    Smokeless tobacco: Never Used   Substance and Sexual Activity    Alcohol use: No    Drug use: No    Sexual activity: Yes     Partners: Male   Other Topics Concern    Not on file   Social History Narrative    Not on file     Prior to Admission medications    Medication Sig Start Date End Date Taking? Authorizing Provider   clindamycin (CLEOCIN) 2 % vaginal cream Insert 1 Applicator into vagina nightly. Indications: once a week   Yes Provider, Historical   hydroxyprogest,PF,,preg presv, (MARY CARMEN, PF,) 275 mg/1.1 mL atIn by SubCUTAneous route once.  Indications: once a week on Tuesday   Yes Provider, Historical        Objective:  Visit Vitals  /60 (BP 1 Location: Right arm, BP Patient Position: At rest)   Pulse 69   Temp 98 °F (36.7 °C)   Resp 16   Ht 5' 10\" (1.778 m)   Wt 76.2 kg (168 lb)   SpO2 100%   BMI 24.11 kg/m²       Prenatal Labs:   Lab Results   Component Value Date/Time    Rubella, External Immune 05/02/2018    GrBStrep, External negative 09/26/2018    HBsAg, External Negative 05/02/2018    HIV, External Negative 05/02/2018    RPR, External Negative 05/02/2018    Gonorrhea, External Negative 05/02/2018    Chlamydia, External Negative 05/02/2018         Recent Results (from the past 24 hour(s))   POC URINE MACROSCOPIC    Collection Time: 10/29/18  5:57 AM   Result Value Ref Range    Color OTHER      Appearance CLEAR      Spec. gravity (POC) 1.020 1.001 - 1.023      pH, urine  (POC) 7.0 5.0 - 9.0      Protein (POC) TRACE (A) NEG mg/dL    Glucose, urine (POC) NEGATIVE  NEG mg/dL    Ketones (POC) NEGATIVE  NEG mg/dL    Bilirubin (POC) NEGATIVE  NEG      Blood (POC) Trace Intact (A) NEG      Urobilinogen (POC) 0.2 0.2 - 1.0 EU/dL    Nitrite (POC) NEGATIVE  NEG      Leukocyte esterase (POC) NEGATIVE  NEG      Performed by Cristiane Biggs        Fetal Heart Rate:   Baseline FHR:   Patient Vitals for the past 2 hrs: Mode Fetal Heart Rate Variability Decelerations Accelerations Non Stress Test   10/29/18 0730 External 125 6-25 BPM None Yes    10/29/18 0702 US Readjusted; External 130       10/29/18 0645 External 125 6-25 BPM None Yes Reactive        Fetal heart variability:moderate  Fetal Heart Rate decelerations: none  Fetal Heart Rate accelerations: yes  At least 10 beats elevation and two or more in twenty minutes  Baseline FHR:between 120-160beats per minute  Fetal Non-stress Test: Reactive  Assessment of NST:  Reactive and  No evidence of fetal comprimise         Estimated Date of Delivery: 18  OB History      Para Term  AB Living    8 5 1 3 1 5    SAB TAB Ectopic Molar Multiple Live Births      1       5              Physical Exam: Per myself or nursing assessment:  Patient without distress  HEENT: No obvious head trauma, she can hear at a conversational level, vision is adequate, and no choking or difficulty swallowing. Neurologically: She oriented to time and place as well as understands her situation and give a good medical history. Chest: clear to auscultation and percussion  Abdomen: benign with no obvious gallbladder or appendicits type pain, no CVA tenderness and the fundal size is consistent with her dates within four centimeters.    Pelvic: External genitalia normal without inflammation, lesions or abnormal discharge  Extremities: with some swelling but not hyperreflexia  CervicalExam:2/50 %/-2/Posterior             Impression/Plan:     Plan: With no change in cervix overnight DC and she knows well what to call for which indeed may be vague. .     Procedures done: screening visit of moderate complexity.                                                Signed By:  Evangelina Mary MD     October 29, 2018

## 2018-10-29 NOTE — DISCHARGE INSTRUCTIONS
Please keep your appointments with your Obstetrician. If any questions call Dr Thee Gomez office, [facility] at [phone].

## 2018-11-07 ENCOUNTER — HOSPITAL ENCOUNTER (INPATIENT)
Age: 38
LOS: 2 days | Discharge: HOME | End: 2018-11-09
Attending: SPECIALIST | Admitting: OBSTETRICS & GYNECOLOGY
Payer: COMMERCIAL

## 2018-11-07 ENCOUNTER — ANESTHESIA (OUTPATIENT)
Dept: LABOR AND DELIVERY | Age: 38
End: 2018-11-07
Payer: COMMERCIAL

## 2018-11-07 ENCOUNTER — ANESTHESIA EVENT (OUTPATIENT)
Dept: LABOR AND DELIVERY | Age: 38
End: 2018-11-07
Payer: COMMERCIAL

## 2018-11-07 PROBLEM — O99.013 ANEMIA AFFECTING PREGNANCY IN THIRD TRIMESTER: Chronic | Status: ACTIVE | Noted: 2018-11-07

## 2018-11-07 PROBLEM — O47.00 PRETERM UTERINE CONTRACTIONS: Status: RESOLVED | Noted: 2018-09-04 | Resolved: 2018-11-07

## 2018-11-07 PROBLEM — O47.00 PRETERM CONTRACTIONS: Status: RESOLVED | Noted: 2018-09-30 | Resolved: 2018-11-07

## 2018-11-07 PROBLEM — Z34.90 TERM PREGNANCY: Status: ACTIVE | Noted: 2018-11-07

## 2018-11-07 LAB
ABO + RH BLD: NORMAL
BASOPHILS # BLD: 0 K/UL (ref 0–0.1)
BASOPHILS NFR BLD: 0 % (ref 0–2)
BLOOD GROUP ANTIBODIES SERPL: NORMAL
DIFFERENTIAL METHOD BLD: ABNORMAL
EOSINOPHIL # BLD: 0.1 K/UL (ref 0–0.4)
EOSINOPHIL NFR BLD: 1 % (ref 0–5)
ERYTHROCYTE [DISTWIDTH] IN BLOOD BY AUTOMATED COUNT: 13.6 % (ref 11.6–14.5)
HCT VFR BLD AUTO: 31.6 % (ref 35–45)
HGB BLD-MCNC: 9.9 G/DL (ref 12–16)
LYMPHOCYTES # BLD: 1.3 K/UL (ref 0.9–3.6)
LYMPHOCYTES NFR BLD: 18 % (ref 21–52)
MCH RBC QN AUTO: 25.1 PG (ref 24–34)
MCHC RBC AUTO-ENTMCNC: 31.3 G/DL (ref 31–37)
MCV RBC AUTO: 80.2 FL (ref 74–97)
MONOCYTES # BLD: 0.5 K/UL (ref 0.05–1.2)
MONOCYTES NFR BLD: 6 % (ref 3–10)
NEUTS SEG # BLD: 5.5 K/UL (ref 1.8–8)
NEUTS SEG NFR BLD: 75 % (ref 40–73)
PLATELET # BLD AUTO: 181 K/UL (ref 135–420)
PMV BLD AUTO: 12.6 FL (ref 9.2–11.8)
RBC # BLD AUTO: 3.94 M/UL (ref 4.2–5.3)
SPECIMEN EXP DATE BLD: NORMAL
WBC # BLD AUTO: 7.4 K/UL (ref 4.6–13.2)

## 2018-11-07 PROCEDURE — 74011250636 HC RX REV CODE- 250/636

## 2018-11-07 PROCEDURE — 74011000250 HC RX REV CODE- 250

## 2018-11-07 PROCEDURE — 74011250637 HC RX REV CODE- 250/637: Performed by: OBSTETRICS & GYNECOLOGY

## 2018-11-07 PROCEDURE — 75410000000 HC DELIVERY VAGINAL/SINGLE

## 2018-11-07 PROCEDURE — 86900 BLOOD TYPING SEROLOGIC ABO: CPT | Performed by: OBSTETRICS & GYNECOLOGY

## 2018-11-07 PROCEDURE — 74011250636 HC RX REV CODE- 250/636: Performed by: ANESTHESIOLOGY

## 2018-11-07 PROCEDURE — 74011250636 HC RX REV CODE- 250/636: Performed by: OBSTETRICS & GYNECOLOGY

## 2018-11-07 PROCEDURE — 75410000003 HC RECOV DEL/VAG/CSECN EA 0.5 HR

## 2018-11-07 PROCEDURE — 77030007879 HC KT SPN EPDRL TELE -B: Performed by: ANESTHESIOLOGY

## 2018-11-07 PROCEDURE — 75410000002 HC LABOR FEE PER 1 HR

## 2018-11-07 PROCEDURE — 4A1H74Z MONITORING OF PRODUCTS OF CONCEPTION, CARDIAC ELECTRICAL ACTIVITY, VIA NATURAL OR ARTIFICIAL OPENING: ICD-10-PCS | Performed by: OBSTETRICS & GYNECOLOGY

## 2018-11-07 PROCEDURE — 85025 COMPLETE CBC W/AUTO DIFF WBC: CPT | Performed by: OBSTETRICS & GYNECOLOGY

## 2018-11-07 PROCEDURE — 77010026065 HC OXYGEN MINIMUM MEDICAL AIR

## 2018-11-07 PROCEDURE — 65270000029 HC RM PRIVATE

## 2018-11-07 RX ORDER — TERBUTALINE SULFATE 1 MG/ML
0.25 INJECTION SUBCUTANEOUS
Status: DISCONTINUED | OUTPATIENT
Start: 2018-11-07 | End: 2018-11-07 | Stop reason: HOSPADM

## 2018-11-07 RX ORDER — ONDANSETRON 2 MG/ML
4 INJECTION INTRAMUSCULAR; INTRAVENOUS
Status: DISCONTINUED | OUTPATIENT
Start: 2018-11-07 | End: 2018-11-07 | Stop reason: HOSPADM

## 2018-11-07 RX ORDER — PHENYLEPHRINE HCL IN 0.9% NACL 0.4MG/10ML
80 SYRINGE (ML) INTRAVENOUS AS NEEDED
Status: DISCONTINUED | OUTPATIENT
Start: 2018-11-07 | End: 2018-11-07 | Stop reason: HOSPADM

## 2018-11-07 RX ORDER — FENTANYL CITRATE 50 UG/ML
INJECTION, SOLUTION INTRAMUSCULAR; INTRAVENOUS AS NEEDED
Status: DISCONTINUED | OUTPATIENT
Start: 2018-11-07 | End: 2018-11-07 | Stop reason: HOSPADM

## 2018-11-07 RX ORDER — MISOPROSTOL 200 UG/1
800 TABLET ORAL
Status: DISCONTINUED | OUTPATIENT
Start: 2018-11-07 | End: 2018-11-07 | Stop reason: HOSPADM

## 2018-11-07 RX ORDER — LIDOCAINE HYDROCHLORIDE AND EPINEPHRINE 15; 5 MG/ML; UG/ML
INJECTION, SOLUTION EPIDURAL AS NEEDED
Status: DISCONTINUED | OUTPATIENT
Start: 2018-11-07 | End: 2018-11-07 | Stop reason: HOSPADM

## 2018-11-07 RX ORDER — OXYTOCIN 10 [USP'U]/ML
10 INJECTION, SOLUTION INTRAMUSCULAR; INTRAVENOUS
Status: DISCONTINUED | OUTPATIENT
Start: 2018-11-07 | End: 2018-11-07 | Stop reason: HOSPADM

## 2018-11-07 RX ORDER — ONDANSETRON 2 MG/ML
4 INJECTION INTRAMUSCULAR; INTRAVENOUS
Status: DISCONTINUED | OUTPATIENT
Start: 2018-11-07 | End: 2018-11-07

## 2018-11-07 RX ORDER — EPHEDRINE SULFATE 50 MG/ML
INJECTION, SOLUTION INTRAVENOUS AS NEEDED
Status: DISCONTINUED | OUTPATIENT
Start: 2018-11-07 | End: 2018-11-07 | Stop reason: HOSPADM

## 2018-11-07 RX ORDER — NALOXONE HYDROCHLORIDE 0.4 MG/ML
INJECTION, SOLUTION INTRAMUSCULAR; INTRAVENOUS; SUBCUTANEOUS AS NEEDED
Status: DISCONTINUED | OUTPATIENT
Start: 2018-11-07 | End: 2018-11-07 | Stop reason: HOSPADM

## 2018-11-07 RX ORDER — OXYCODONE AND ACETAMINOPHEN 5; 325 MG/1; MG/1
2 TABLET ORAL
Status: DISCONTINUED | OUTPATIENT
Start: 2018-11-07 | End: 2018-11-09 | Stop reason: HOSPADM

## 2018-11-07 RX ORDER — EPHEDRINE SULFATE 50 MG/ML
10 INJECTION, SOLUTION INTRAVENOUS AS NEEDED
Status: DISCONTINUED | OUTPATIENT
Start: 2018-11-07 | End: 2018-11-07 | Stop reason: HOSPADM

## 2018-11-07 RX ORDER — ZOLPIDEM TARTRATE 5 MG/1
5 TABLET ORAL
Status: DISCONTINUED | OUTPATIENT
Start: 2018-11-07 | End: 2018-11-09 | Stop reason: HOSPADM

## 2018-11-07 RX ORDER — OXYTOCIN/RINGER'S LACTATE 20/1000 ML
125 PLASTIC BAG, INJECTION (ML) INTRAVENOUS
Status: DISCONTINUED | OUTPATIENT
Start: 2018-11-07 | End: 2018-11-07 | Stop reason: HOSPADM

## 2018-11-07 RX ORDER — BUTORPHANOL TARTRATE 1 MG/ML
2 INJECTION INTRAMUSCULAR; INTRAVENOUS
Status: DISCONTINUED | OUTPATIENT
Start: 2018-11-07 | End: 2018-11-07 | Stop reason: HOSPADM

## 2018-11-07 RX ORDER — SODIUM CHLORIDE 0.9 % (FLUSH) 0.9 %
5-10 SYRINGE (ML) INJECTION EVERY 8 HOURS
Status: DISCONTINUED | OUTPATIENT
Start: 2018-11-07 | End: 2018-11-07 | Stop reason: HOSPADM

## 2018-11-07 RX ORDER — NALBUPHINE HYDROCHLORIDE 10 MG/ML
10 INJECTION, SOLUTION INTRAMUSCULAR; INTRAVENOUS; SUBCUTANEOUS
Status: DISCONTINUED | OUTPATIENT
Start: 2018-11-07 | End: 2018-11-07 | Stop reason: HOSPADM

## 2018-11-07 RX ORDER — PROMETHAZINE HYDROCHLORIDE 25 MG/ML
25 INJECTION, SOLUTION INTRAMUSCULAR; INTRAVENOUS
Status: DISCONTINUED | OUTPATIENT
Start: 2018-11-07 | End: 2018-11-09 | Stop reason: HOSPADM

## 2018-11-07 RX ORDER — NALOXONE HYDROCHLORIDE 0.4 MG/ML
0.2 INJECTION, SOLUTION INTRAMUSCULAR; INTRAVENOUS; SUBCUTANEOUS AS NEEDED
Status: COMPLETED | OUTPATIENT
Start: 2018-11-07 | End: 2018-11-07

## 2018-11-07 RX ORDER — AMOXICILLIN 250 MG
1 CAPSULE ORAL
Status: DISCONTINUED | OUTPATIENT
Start: 2018-11-07 | End: 2018-11-08

## 2018-11-07 RX ORDER — CARBOPROST TROMETHAMINE 250 UG/ML
250 INJECTION, SOLUTION INTRAMUSCULAR
Status: DISCONTINUED | OUTPATIENT
Start: 2018-11-07 | End: 2018-11-07 | Stop reason: HOSPADM

## 2018-11-07 RX ORDER — ACETAMINOPHEN 325 MG/1
650 TABLET ORAL
Status: DISCONTINUED | OUTPATIENT
Start: 2018-11-07 | End: 2018-11-09 | Stop reason: HOSPADM

## 2018-11-07 RX ORDER — IBUPROFEN 400 MG/1
800 TABLET ORAL
Status: DISCONTINUED | OUTPATIENT
Start: 2018-11-07 | End: 2018-11-09 | Stop reason: HOSPADM

## 2018-11-07 RX ORDER — OXYTOCIN/RINGER'S LACTATE 20/1000 ML
500 PLASTIC BAG, INJECTION (ML) INTRAVENOUS
Status: DISCONTINUED | OUTPATIENT
Start: 2018-11-07 | End: 2018-11-07 | Stop reason: HOSPADM

## 2018-11-07 RX ORDER — SODIUM CHLORIDE, SODIUM LACTATE, POTASSIUM CHLORIDE, CALCIUM CHLORIDE 600; 310; 30; 20 MG/100ML; MG/100ML; MG/100ML; MG/100ML
125 INJECTION, SOLUTION INTRAVENOUS CONTINUOUS
Status: DISCONTINUED | OUTPATIENT
Start: 2018-11-07 | End: 2018-11-07 | Stop reason: HOSPADM

## 2018-11-07 RX ORDER — NALBUPHINE HYDROCHLORIDE 10 MG/ML
2.5 INJECTION, SOLUTION INTRAMUSCULAR; INTRAVENOUS; SUBCUTANEOUS
Status: DISCONTINUED | OUTPATIENT
Start: 2018-11-07 | End: 2018-11-07 | Stop reason: HOSPADM

## 2018-11-07 RX ORDER — METHYLERGONOVINE MALEATE 0.2 MG/ML
0.2 INJECTION INTRAVENOUS AS NEEDED
Status: DISCONTINUED | OUTPATIENT
Start: 2018-11-07 | End: 2018-11-07 | Stop reason: HOSPADM

## 2018-11-07 RX ORDER — MINERAL OIL
OIL (ML) ORAL
Status: DISPENSED
Start: 2018-11-07 | End: 2018-11-07

## 2018-11-07 RX ORDER — HYDROMORPHONE HYDROCHLORIDE 1 MG/ML
1 INJECTION, SOLUTION INTRAMUSCULAR; INTRAVENOUS; SUBCUTANEOUS
Status: DISCONTINUED | OUTPATIENT
Start: 2018-11-07 | End: 2018-11-07 | Stop reason: HOSPADM

## 2018-11-07 RX ORDER — FENTANYL CITRATE 50 UG/ML
100 INJECTION, SOLUTION INTRAMUSCULAR; INTRAVENOUS ONCE
Status: COMPLETED | OUTPATIENT
Start: 2018-11-07 | End: 2018-11-07

## 2018-11-07 RX ORDER — LIDOCAINE HYDROCHLORIDE 10 MG/ML
20 INJECTION, SOLUTION EPIDURAL; INFILTRATION; INTRACAUDAL; PERINEURAL AS NEEDED
Status: DISCONTINUED | OUTPATIENT
Start: 2018-11-07 | End: 2018-11-07 | Stop reason: HOSPADM

## 2018-11-07 RX ORDER — NALOXONE HYDROCHLORIDE 0.4 MG/ML
INJECTION, SOLUTION INTRAMUSCULAR; INTRAVENOUS; SUBCUTANEOUS
Status: COMPLETED
Start: 2018-11-07 | End: 2018-11-07

## 2018-11-07 RX ORDER — SODIUM CHLORIDE 0.9 % (FLUSH) 0.9 %
5-10 SYRINGE (ML) INJECTION AS NEEDED
Status: DISCONTINUED | OUTPATIENT
Start: 2018-11-07 | End: 2018-11-07 | Stop reason: HOSPADM

## 2018-11-07 RX ADMIN — NALOXONE HYDROCHLORIDE 0.2 MG: 0.4 INJECTION, SOLUTION INTRAMUSCULAR; INTRAVENOUS; SUBCUTANEOUS at 08:56

## 2018-11-07 RX ADMIN — MINERAL OIL 1 ML: 471.99 OIL TOPICAL at 12:00

## 2018-11-07 RX ADMIN — SODIUM CHLORIDE, SODIUM LACTATE, POTASSIUM CHLORIDE, AND CALCIUM CHLORIDE 125 ML/HR: 600; 310; 30; 20 INJECTION, SOLUTION INTRAVENOUS at 11:10

## 2018-11-07 RX ADMIN — SODIUM CHLORIDE, SODIUM LACTATE, POTASSIUM CHLORIDE, AND CALCIUM CHLORIDE 125 ML/HR: 600; 310; 30; 20 INJECTION, SOLUTION INTRAVENOUS at 05:50

## 2018-11-07 RX ADMIN — ACETAMINOPHEN 650 MG: 325 TABLET, FILM COATED ORAL at 20:20

## 2018-11-07 RX ADMIN — Medication 125 ML/HR: at 12:20

## 2018-11-07 RX ADMIN — LIDOCAINE HYDROCHLORIDE AND EPINEPHRINE 4.5 ML: 15; 5 INJECTION, SOLUTION EPIDURAL at 07:47

## 2018-11-07 RX ADMIN — SODIUM CHLORIDE, SODIUM LACTATE, POTASSIUM CHLORIDE, AND CALCIUM CHLORIDE 125 ML/HR: 600; 310; 30; 20 INJECTION, SOLUTION INTRAVENOUS at 04:50

## 2018-11-07 RX ADMIN — EPHEDRINE SULFATE 10 MG: 50 INJECTION, SOLUTION INTRAVENOUS at 07:52

## 2018-11-07 RX ADMIN — IBUPROFEN 800 MG: 400 TABLET ORAL at 20:20

## 2018-11-07 RX ADMIN — IBUPROFEN 800 MG: 400 TABLET ORAL at 13:15

## 2018-11-07 RX ADMIN — NALOXONE HYDROCHLORIDE 0.08 MG: 0.4 INJECTION, SOLUTION INTRAMUSCULAR; INTRAVENOUS; SUBCUTANEOUS at 09:40

## 2018-11-07 RX ADMIN — EPHEDRINE SULFATE 10 MG: 50 INJECTION, SOLUTION INTRAVENOUS at 08:23

## 2018-11-07 RX ADMIN — FENTANYL CITRATE 100 MCG: 50 INJECTION, SOLUTION INTRAMUSCULAR; INTRAVENOUS at 07:49

## 2018-11-07 RX ADMIN — SODIUM CHLORIDE, SODIUM LACTATE, POTASSIUM CHLORIDE, AND CALCIUM CHLORIDE 125 ML/HR: 600; 310; 30; 20 INJECTION, SOLUTION INTRAVENOUS at 08:55

## 2018-11-07 RX ADMIN — FENTANYL CITRATE 100 MCG: 50 INJECTION, SOLUTION INTRAMUSCULAR; INTRAVENOUS at 07:13

## 2018-11-07 RX ADMIN — NALOXONE HYDROCHLORIDE 0.08 MG: 0.4 INJECTION, SOLUTION INTRAMUSCULAR; INTRAVENOUS; SUBCUTANEOUS at 08:50

## 2018-11-07 RX ADMIN — EPHEDRINE SULFATE 10 MG: 50 INJECTION, SOLUTION INTRAVENOUS at 08:02

## 2018-11-07 NOTE — PROGRESS NOTES
Labor progress note:  
 
 
Here to evaluate labor progress. Estimated Gestational Age: 42w4d Baseline FHR:  
Patient Vitals for the past 2 hrs: Mode Fetal Heart Rate Variability Decelerations Accelerations Non Stress Test  
18 0700 External 125 6-25 BPM None Yes Reactive 18 0659 External 129 6-25 BPM None Yes Non Reactive 18 0630 External 127 6-25 BPM None Yes  Previous pelvic exam:    
 Cervical Exam 
Dilation (cm): 5 Eff: 80 % Station: -1 Vaginal exam done by? : Dr Martin [facility] Visit Vitals /66 Pulse 72 Temp 98.5 °F (36.9 °C) Resp 12 Wt 76.7 kg (169 lb) SpO2 100% BMI 24.25 kg/m² Temp (24hrs), Av.5 °F (36.9 °C), Min:98.5 °F (36.9 °C), Max:98.5 °F (36.9 °C) WBC Date/Time Value Ref Range Status 2018 05:05 AM 7.4 4.6 - 13.2 K/uL Final  
10/27/2018 10:30 AM 7.4 4.6 - 13.2 K/uL Final  
2017 01:20 AM 7.7 4.6 - 13.2 K/uL Final  
 
HGB Date/Time Value Ref Range Status 2018 05:05 AM 9.9 (L) 12.0 - 16.0 g/dL Final  
10/27/2018 10:30 AM 12.0 12.0 - 16.0 g/dL Final  
2017 05:55 AM 9.0 (L) 12.0 - 16.0 g/dL Final  
 
PLATELET Date/Time Value Ref Range Status 2018 05:05  135 - 420 K/uL Final  
 
Hgb, External  
Date/Time Value Ref Range Status 2018 11.3  Final  
 
Platelet cnt., External  
Date/Time Value Ref Range Status 2018 277  Final  
Morris spontaneously q2 min Cervix 70/-1 Joanna's progress was discussed,as well as her continued options. As a precaution I repeated the options of continued observation, cesearean-section, or possible operative vaginal delivery   We have decided to continue with the plan for vaginal delivery . Plan: intra thecal block , anesthsia at bedside , VSS  
 AROM: small clear fluid , ISE placed Wendolyn Hodgkin, MD

## 2018-11-07 NOTE — PROGRESS NOTES
presenting with ucs since 190, now q5-7 mins apart and uncomfortable. Sve slight change from office was 3-4,-2 now 4 -1 . 
0030 observe for further time from Dr Jaquan Chanel after THE [facility]  REPORT.  
0050   Pt breathing with ucs, POC discussed. 0515 Sve by Dr Jaquan Chanel 5, 80 -1. Pt   FOR ADMISSION, now in progress. 5265 Requesting Epidural, [facility] q 7640 Dr Smiley Chua already responded to call, Asked for room no again. Pt well in control.

## 2018-11-07 NOTE — ANESTHESIA PROCEDURE NOTES
Epidural Block Start time: 11/7/2018 7:13 AM 
End time: 11/7/2018 8:30 AM 
Performed by: kO Mead CRNA Authorized by: Beti Gant MD  
 
Pre-Procedure Indication: at surgeon's request, primary anesthetic and labor epidural   
Preanesthetic Checklist: patient identified, risks and benefits discussed, anesthesia consent, site marked, patient being monitored, timeout performed and anesthesia consent Epidural:  
Patient position:  Seated Prep region:  Lumbar Prep: Betadine and Patient draped Location:  L3-4 Needle and Epidural Catheter:  
Needle Type:  Tuohy Needle Gauge:  18 G Injection Technique:  Loss of resistance using saline Attempts:  3 Catheter Size:  20 G Catheter at Skin Depth (cm):  10 Depth in Epidural Space (cm):  4 Events: no blood with aspiration, no cerebrospinal fluid with aspiration and no paresthesia Test Dose:  Negative Assessment:  
Catheter Secured:  Tegaderm and tape Insertion:  Complicated For Vitals see nursing notes. 2 Attempts by YONG Arnett with os preventing passage. 1 attempt by me with SATINDER, no csf, ez thread. TD with patient feeling \"numbing\" changed to warm/tingeling in feet and buttocks but able to move toes. Aspirated catheter again with no CSF return. Dosed with 100 mcg of fentanyl and 2cc of .125% marcaine. Pt became weak and sleepy. Laid supine and corrected pressure with ephedrine. (see chart) Sat patient upright and remained with patient until stable. Dosed with . 08 mg Narcan x 2 for pruritis. Catheter remains in place and may dose with 1cc of ropivicaine if pain returns. RN staff aware to NOT place anything in epidural catheter.   
 
Jose Frausto CRNA 
10:27 AM

## 2018-11-07 NOTE — H&P
Admission H&P Nae Esquivel is a 39yo [id number] at 37.1 weeks presenting with contractions. Started last night at bedtime and irregular pattern. Had Butler cerclage placed at 20 weeks because of shortened cervical length and cerclage removed last week. OB history significant for 3  deliveries 36-37 weeks. Has  been on iftikhar weekly this pregnancy. AMA and NIPT negative (XX) GBS negative. 
  
    
Past Medical History:  
Diagnosis Date   delivery   
  
OB  history 99 -  at 40wks, 6lb 14oz, F 
00 -  at 30wks, 3lb 12oz, M 
02 -  at 36wks, 6lb, M 
03 -  at 36wks, 6lb, F 
17 -  at 37.4wks, 8lb, M 
SAB x2 Past Surgical History:  
Procedure Laterality Date  HX BREAST AUGMENTATION Bilateral   HX OTHER SURGICAL      
  Right foot  HX OTHER SURGICAL      
  Tubal reversal  
 HX TUBAL LIGATION     
  
Social History  
  
     
Occupational History  Not on file Tobacco Use  Smoking status: Never Smoker  Smokeless tobacco: Never Used Substance and Sexual Activity  Alcohol use: No  
 Drug use: No  
 Sexual activity: Yes  
    Partners: Male  
  
No family history on file. 
  
No Known Allergies Prior to Admission medications Medication Sig Start Date End Date Taking? Authorizing Provider  
clindamycin (CLEOCIN) 2 % vaginal cream Insert 1 Applicator into vagina nightly. Indications: once a week       Provider, Historical  
hydroxyprogest,PF,,preg presv, (IFTIKHAR, PF,) 275 mg/1.1 mL atIn by SubCUTAneous route once. Indications: once a week on Tuesday       Provider, Historical  
  
  
Review of Systems: A comprehensive review of systems was negative except for that written in the HPI. 
  
Objective:  
  
Visit Vitals /70 Pulse 71 Temp 98.5 °F (36.9 °C) Wt 76.7 kg (169 lb) SpO2 100% BMI 24.25 kg/m²  
 
   
  
Physical Exam: 
Heart: RRR Lungs: Clear Abdomen: gravid, nontender Membranes:  Intact Fetal Heart Rate: Reactive TOCO q 5-6 
  
Cervix: 5/80/-1 vertex bbow 
  
Prenatal Labs:  
     
Lab Results Component Value Date/Time  
  Rubella, External Immune 05/02/2018  
  GrBStrep, External Negative 10/27/2017  
  HBsAg, External Negative 05/02/2018  
  HIV, External Negative 05/02/2018  
  RPR, External Negative 05/02/2018  
  Gonorrhea, External Negative 05/02/2018  
  Chlamydia, External Negative 05/02/2018  
  
  
  
Assessment/Plan:  
  
IUP at 37.1 weeks Early labor with irregular contractions Admit to L&D Will AROM soon and anticipate vaginal birth Elena Boyce MD

## 2018-11-07 NOTE — PROGRESS NOTES
7116: Anesthesia In room with patient, reviewing consent. 4711: Time out performed. Two patient identifiers, allergies, and procedure reviewed. 7296: Test dose given by Kamla Sherry CRNA.  
8265: Bolus of fentanyl given. 9943: Pt states\" I feel hot\", pt placed back to bed.  
0751: started bolus of LR at 999ml/hr 
0752: Ephedrine 10 mg given to patient by Kamla Sherry Anesthesia. 0755: /60 
0802: Ephedrine given by Kamla Sherry ANesthesia  10 mg 
0807: Placed non-rebreather mask bag at 10 L/min due to low oxygenation 7090: Pt was positioned in a sitting position on bed.  
0827: Dr Mame Martinez in, cervical exam, AROM patient, [facility] placed on infant scalp to better monitor patient. 0845: De Anda placed IN. 
7971 Narcan given by anesthesia Kamla Sherry 0.08 
0588: Pt alert, oxygen removed. 0908: [place] performed T8 level, unable to wiggle toes. 6334: Anesthesia in room, received Narcan 0.08 mg. Pt is alert and talking to anesthesia. 0940: pt still numbed on her legs 1041: Paged anesthesia. Pt is in pain, wants epidural connected. 1057: Called Dr Mame Martinez to let her know cervical exam findings. She will see a patient and come after that.  
1102: paged anesthesia. Dr Bradford Mars called back, advised cervical exam. 
1114: Dr Mame Martinez in room. Cervical exam performed. 1117: Trial push with Dr Mame Martinez. 1159: de anda removed. 1200 Pushing started. Dr Mame Martinez in room at bedside. Nursery called for delivery. 1201: Nursery arrived [facility] RN and Kaci Bennett RN 
21 : BG delivered via . 1220: Pitocin running. 1223: Placenta delivered spontaneously. 1445: Assisted to bathroom, change clothes and provided basic care. Pt was transferred via wheelchair to room 4472.

## 2018-11-07 NOTE — L&D DELIVERY NOTE
Delivery Summary    Patient: Ally Cassidy MRN: [id number]  SSN: [id number]    YOB: 1980  Age: 34 y.o. Sex: female       Information for the patient's :  Conchis Werner [[id number]]       Labor Events:    Labor:     Rupture Date: 2018   Rupture Time: 8:27 AM   Rupture Type AROM   Amniotic Fluid Volume:      Amniotic Fluid Description: Clear None   Induction:         Augmentation:     Labor Events:       Cervical Ripening:           Delivery Events:  Episiotomy:     Laceration(s):       Repaired:      Number of Repair Packets:     Suture Type and Size:       Estimated Blood Loss (ml):  ml       Delivery Date:     Delivery Time:   Delivery Type:    Sex:       Gestational Age: 42w4d   Delivery Clinician:     Living Status:     Delivery Location:              APGARS  One minute Five minutes Ten minutes   Skin color:            Heart rate:            Grimace:            Muscle tone:            Breathing: Totals:                Presentation:      Position:        Resuscitation Method:        Meconium Stained:        Cord Information:    Complications:    Cord around:    Delayed cord clamping? Cord clamped date/time:   Disposition of Cord Blood:      Blood Gases Sent?:      Placenta:  Date/Time:    Removal:        Appearance:        Measurements:  Birth Weight:        Birth Length:        Head Circumference:        Chest Circumference:       Abdominal Girth:       Other Providers:    , Obstetrician;Primary Nurse;Primary  Nurse;Nicu Nurse;Neonatologist;Anesthesiologist;Crna;Nurse Practitioner;Midwife;Nursery Nurse           Group B Strep:   Lab Results   Component Value Date/Time    GrBStrep, External negative 2018     Information for the patient's :  Conchis Werner [[id number]]   No results found for: ABORH, PCTABR, PCTDIG, BILI, ABORHEXT, ABORH    No results found for: APH, APCO2, APO2, AHCO3, ABEC, ABDC, O2ST, EPHV, PCO2V, PO2V, HCO3V, EBEV, EBDV, SITE, RSCOM

## 2018-11-08 LAB
HCT VFR BLD AUTO: 30 % (ref 35–45)
HGB BLD-MCNC: 9.2 G/DL (ref 12–16)

## 2018-11-08 PROCEDURE — 74011250637 HC RX REV CODE- 250/637: Performed by: OBSTETRICS & GYNECOLOGY

## 2018-11-08 PROCEDURE — 36415 COLL VENOUS BLD VENIPUNCTURE: CPT

## 2018-11-08 PROCEDURE — 85014 HEMATOCRIT: CPT

## 2018-11-08 PROCEDURE — 65270000029 HC RM PRIVATE

## 2018-11-08 PROCEDURE — 85018 HEMOGLOBIN: CPT

## 2018-11-08 RX ORDER — LANOLIN ALCOHOL/MO/W.PET/CERES
1 CREAM (GRAM) TOPICAL 2 TIMES DAILY WITH MEALS
Status: DISCONTINUED | OUTPATIENT
Start: 2018-11-08 | End: 2018-11-09 | Stop reason: HOSPADM

## 2018-11-08 RX ORDER — DOCUSATE SODIUM 100 MG/1
100 CAPSULE, LIQUID FILLED ORAL 2 TIMES DAILY
Status: DISCONTINUED | OUTPATIENT
Start: 2018-11-08 | End: 2018-11-09 | Stop reason: HOSPADM

## 2018-11-08 RX ORDER — ASCORBIC ACID 250 MG
500 TABLET ORAL 2 TIMES DAILY
Status: DISCONTINUED | OUTPATIENT
Start: 2018-11-08 | End: 2018-11-09 | Stop reason: HOSPADM

## 2018-11-08 RX ADMIN — ACETAMINOPHEN 650 MG: 325 TABLET, FILM COATED ORAL at 10:08

## 2018-11-08 RX ADMIN — DOCUSATE SODIUM 100 MG: 100 CAPSULE, LIQUID FILLED ORAL at 10:09

## 2018-11-08 RX ADMIN — IBUPROFEN 800 MG: 400 TABLET ORAL at 21:09

## 2018-11-08 RX ADMIN — FERROUS SULFATE TAB 325 MG (65 MG ELEMENTAL FE) 325 MG: 325 (65 FE) TAB at 10:09

## 2018-11-08 RX ADMIN — Medication 500 MG: at 10:09

## 2018-11-08 RX ADMIN — ACETAMINOPHEN 650 MG: 325 TABLET, FILM COATED ORAL at 05:15

## 2018-11-08 RX ADMIN — IBUPROFEN 800 MG: 400 TABLET ORAL at 12:57

## 2018-11-08 RX ADMIN — ACETAMINOPHEN 650 MG: 325 TABLET, FILM COATED ORAL at 00:45

## 2018-11-08 RX ADMIN — IBUPROFEN 800 MG: 400 TABLET ORAL at 05:15

## 2018-11-08 NOTE — ROUTINE PROCESS
Bedside and Verbal shift change report given to Haris Mcgowan RN (oncoming nurse) by Maxime TAFOYA (offgoing nurse). Report included the following information SBAR, Kardex, OR Summary, Procedure Summary, Intake/Output, MAR, Recent Results and Med Rec Status. Assumed care of pt. Introduced myself and updated whiteboard, explained nursing plan of care for my shift. Pt alert and oriented; assessment and vitals completed, WNL. Pt denies headache, visual disturbances and epigastric pain. Pt verbalized agreement to plan. Questions answered.

## 2018-11-08 NOTE — PROGRESS NOTES
1915 - Bedside and Verbal shift change report given to LOTTIE Pardo (oncoming nurse) by June Casillas RN (offgoing nurse). Report included the following information SBAR, Kardex, Intake/Output, MAR and Recent Results. 2010 - Discussed plan for shift, hourly rounding, pain reporting/management, normal vs abnormal findings, tests/procedures ordered, when to call nurse and pt safety. Questions answered. Assessment completed. VSS. Infant taken to nursery. 2115 - Encouraged to shower. 2150 - Infant returned to room. 11/8/18 0050 - Assessment completed. VSS. Pt c/o 8/10 back pain where epidural attempts were, but is refusing any PRN pain medications besides tylenol and motrin. Support given. Questions answered. Pt \"would like to rest now. \" 
 
0710 - Verbal shift change report given to Cici Vivar RN (oncoming nurse) by April Velez RN (offgoing nurse). Report included the following information SBAR, Kardex, Intake/Output, MAR and Recent Results.

## 2018-11-08 NOTE — PROGRESS NOTES
Visited with mother and acquired permission to announce baby's name. Aria 3 Board Certified [employer]  
[phone]

## 2018-11-08 NOTE — PROGRESS NOTES
Progress Note Patient: Marilou Gifford MRN: [id number]  SSN: [id number] YOB: 1980  Age: 34 y.o. Sex: female Subjective:  
 
 
Pt denies complaints. Ambulating, voiding, tolerating regular diet, minimal lochia, pain controlled with po pain meds. Breastfeeding. Denies dizziness, lightheadedness, fevers, chills, CP, SOB, HA, visual changes, RUQ/epigastric pain. Objective:  
  
Patient Vitals for the past 12 hrs: 
 Temp Pulse Resp BP SpO2  
18 0050 97.9 °F (36.6 °C) 78 17 108/59 100 % Physical Exam: 
General:  Alert and oriented, no apparent distress Abdomen:  Soft, non distended, non tender to palpation, firm fundus below umbilicus Lower extremities bilaterally:  Non tender to palpation, no edema/cyanosis/clubbing/redness Lab/Data Review: 
Recent Results (from the past 24 hour(s)) HEMOGLOBIN Collection Time: 18  5:50 AM  
Result Value Ref Range HGB 9.2 (L) 12.0 - 16.0 g/dL HEMATOCRIT Collection Time: 18  5:50 AM  
Result Value Ref Range HCT 30.0 (L) 35.0 - 45.0 % Hgb 9.9 -> 9.2 Assessment/Plan:  
 
34 y.o. [id number] PPD#1 s/p  at 37.1wks with labor - Postpartum care discussed including diet, ambulation, and actvitiy restrictions. - Anemia - iron/Vit C with Colace. Pt asymptomatic 
- Not 24hrs postpartum, stay today, DC tomorrow likely Signed By: Maile Riddle MD   
 2018

## 2018-11-08 NOTE — ANESTHESIA POSTPROCEDURE EVALUATION
* No procedures listed *. Anesthesia Post Evaluation Multimodal analgesia: multimodal analgesia not used between 6 hours prior to anesthesia start to PACU discharge Patient location during evaluation: PACU Patient participation: complete - patient participated Level of consciousness: awake and alert Pain management: satisfactory to patient Airway patency: patent Anesthetic complications: no 
Cardiovascular status: acceptable Respiratory status: acceptable Hydration status: acceptable Post anesthesia nausea and vomiting:  none Visit Vitals /59 (BP 1 Location: Right arm, BP Patient Position: At rest;Sitting) Pulse 78 Temp 36.6 °C (97.9 °F) Resp 17 Wt 76.7 kg (169 lb) SpO2 100% Breastfeeding? Unknown BMI 24.25 kg/m²

## 2018-11-08 NOTE — ROUTINE PROCESS
TRANSFER - IN REPORT: 
 
Verbal report received from [address] [place] [address] Joe Davis RN(name) on Iftikhar Awad  being received from L&D recovery(unit) for routine progression of care Report consisted of patients Situation, Background, Assessment and  
Recommendations(SBAR). Information from the following report(s) SBAR, Kardex, Procedure Summary, Intake/Output, MAR and Recent Results was reviewed with the receiving nurse. Opportunity for questions and clarification was provided. Assessment completed upon patients arrival to unit and care assumed. 1745--assessment done--resting comfortably without complaint. 1830--vital signs stable--voiding qs--effective pain management with Motrin.

## 2018-11-08 NOTE — PROGRESS NOTES
Called by Jazmyn Senior CRNA, that epidural catheter accidentally was inserted intrathecally. Reportedly, aspiration and test dose were negative. Subsequently, 100 Mcg Fentanyl was given through the epidural catheter. Pt became sedated with LEs motor blockade. Mild hypotension was relieved with  2 boluses of ephedrine. FHR was nl. Upon arrival, pt was sedated but arousable, following command, VSS, had LEs blockade and T4 sensory blockade. Narcan 0.08 mg x 2 were given. Pt became alert and awake and did not require any further Narcan. She was monitored closely. Subsequently, sensory and motor blockade were returned to normal and pt experienced labor pain again, 0.2 cc of Naropin 0.1% were given through the catheter that relieved pain. Pt had successful vaginal delivery. In order to decrease the incidence of PDPH, spinal catheter will be removed tomorrow.

## 2018-11-09 VITALS
HEART RATE: 77 BPM | BODY MASS INDEX: 24.25 KG/M2 | TEMPERATURE: 98.6 F | SYSTOLIC BLOOD PRESSURE: 119 MMHG | OXYGEN SATURATION: 99 % | WEIGHT: 169 LBS | RESPIRATION RATE: 16 BRPM | DIASTOLIC BLOOD PRESSURE: 73 MMHG

## 2018-11-09 PROCEDURE — 90686 IIV4 VACC NO PRSV 0.5 ML IM: CPT | Performed by: SPECIALIST

## 2018-11-09 PROCEDURE — 74011250637 HC RX REV CODE- 250/637: Performed by: OBSTETRICS & GYNECOLOGY

## 2018-11-09 PROCEDURE — 90471 IMMUNIZATION ADMIN: CPT

## 2018-11-09 PROCEDURE — 76060000078 HC EPIDURAL ANESTHESIA

## 2018-11-09 PROCEDURE — 74011250636 HC RX REV CODE- 250/636: Performed by: SPECIALIST

## 2018-11-09 PROCEDURE — 3E02340 INTRODUCTION OF INFLUENZA VACCINE INTO MUSCLE, PERCUTANEOUS APPROACH: ICD-10-PCS | Performed by: OBSTETRICS & GYNECOLOGY

## 2018-11-09 RX ORDER — IBUPROFEN 800 MG/1
800 TABLET ORAL
Qty: 60 TAB | Refills: 1 | Status: SHIPPED | OUTPATIENT
Start: 2018-11-09 | End: 2018-12-23

## 2018-11-09 RX ORDER — DOCUSATE SODIUM 100 MG/1
100 CAPSULE, LIQUID FILLED ORAL
Qty: 60 CAP | Refills: 2 | Status: SHIPPED | OUTPATIENT
Start: 2018-11-09 | End: 2018-12-23

## 2018-11-09 RX ORDER — CYANOCOBALAMIN (VITAMIN B-12) 1000 MCG
1 TABLET, EXTENDED RELEASE ORAL DAILY
Qty: 30 TAB | Refills: 1 | Status: SHIPPED | OUTPATIENT
Start: 2018-11-09 | End: 2018-12-23

## 2018-11-09 RX ADMIN — IBUPROFEN 800 MG: 400 TABLET ORAL at 08:50

## 2018-11-09 RX ADMIN — DOCUSATE SODIUM 100 MG: 100 CAPSULE, LIQUID FILLED ORAL at 08:49

## 2018-11-09 RX ADMIN — INFLUENZA VIRUS VACCINE 0.5 ML: 15; 15; 15; 15 SUSPENSION INTRAMUSCULAR at 10:05

## 2018-11-09 RX ADMIN — FERROUS SULFATE TAB 325 MG (65 MG ELEMENTAL FE) 325 MG: 325 (65 FE) TAB at 08:50

## 2018-11-09 RX ADMIN — Medication 500 MG: at 08:49

## 2018-11-09 NOTE — ROUTINE PROCESS
Bedside and Verbal shift change report given to GARLAND Schumacher (oncoming nurse) by FABIO Huber RN (offgoing nurse). Report included the following information SBAR, Kardex, OR Summary, Procedure Summary, Intake/Output, MAR and Recent Results. Assumed care of pt. Introduced myself and updated whiteboard, explained nursing plan of care for my shift. Pt alert and oriented; assessment and vitals completed, WNL. Pt denies headache, visual disturbances and epigastric pain. Pt verbalized agreement to plan. Questions answered. Bonding and feeding the baby well. Pain is well managed with Motrin. Voiding freely without problem. Pt discharge teaching reinforced, pt has no questions at this time. Vitals signs stable. ID bands removed and shredded at pt request. Pt discharged home with belongings via wheelchair to car.

## 2018-11-09 NOTE — ROUTINE PROCESS
Bedside and Verbal shift change report given to Keri Laurent RN (oncoming nurse) by SIMONA Figueroa (offgoing nurse). Report included the following information SBAR, Kardex, Intake/Output, MAR and Recent Results.

## 2018-11-09 NOTE — DISCHARGE SUMMARY
Obstetrical Discharge Summary       [place] [id number]  [address] [facility]  [phone]        Maribel HARRIS,[id number],60 y.o.,1980    Postpartum Day:    Information for the patient's :  Gurinder Nelson [[id number]]   3 days       Admit Date: 2018    Discharge Date: 2018     Admitting Physician: Ronnie Fabian MD     Admission Diagnoses: Term pregnancy    Discharge Diagnoses: same as above      Additional Diagnoses:Present on Admission:   Current pregnancy with history of  labor   Labor and delivery, indication for care   Anemia affecting pregnancy in third trimester       Patient Active Problem List   Diagnosis Code    Current pregnancy with history of  labor O09.219    Pregnant and not yet delivered in third trimester Z34.93    Labor and delivery, indication for care O75.9    Anemia affecting pregnancy in third trimester O99.013    Term pregnancy Z34.80         Procedure:        Baby link  Information for the patient's :  Gurinder Nelson [[id number]]     Delivery Type: Vaginal, Spontaneous   Delivery Date: 2018   Delivery Time: 12:19 PM     Birth Weight: 2.79 kg     Sex:  female  Delivery Clinician:  Samira Carson   Gestational Age: Gestational Age: 42w4d    Presentation: Vertex   Position:             Apgars were 8  and 9      Resuscitation Method: Tactile Stimulation     Meconium Stained: None  Living Status: Living       Placenta Date/Time: 2018 12:23 PM   Placenta Removal: Spontaneous   Placenta Appearance: Vertex [1]     Cord Information: 3 Vessels    Cord Events: None       Cord Blood Sent?:       Blood Gases Sent?:  No         Baby procedures:    Information for the patient's :  Gurinder Nelson [[id number]]          Feeding Method: Infant Feeding: Formula    Immunizations:    Immunization History   Administered Date(s) Administered    DTaP 10/26/2017    Influenza Vaccine 2017 Immunizations:    Immunization History   Administered Date(s) Administered    DTaP 10/26/2017    Influenza Vaccine 2017       Group Beta Strep:   GrBStrep, External   Date Value Ref Range Status   2018 negative  Final          WBC   Date/Time Value Ref Range Status   2018 05:05 AM 7.4 4.6 - 13.2 K/uL Final   10/27/2018 10:30 AM 7.4 4.6 - 13.2 K/uL Final   2017 01:20 AM 7.7 4.6 - 13.2 K/uL Final     HGB   Date/Time Value Ref Range Status   2018 05:50 AM 9.2 (L) 12.0 - 16.0 g/dL Final   2018 05:05 AM 9.9 (L) 12.0 - 16.0 g/dL Final   10/27/2018 10:30 AM 12.0 12.0 - 16.0 g/dL Final     PLATELET   Date/Time Value Ref Range Status   2018 05:05  135 - 420 K/uL Final     Hgb, External   Date/Time Value Ref Range Status   2018 11.3  Final     Platelet cnt., External   Date/Time Value Ref Range Status   2018 277  Final         Hospital Course: Unremarkable  Subjective:         Gina Pinedaceli desires discharge today. Baby stable - bottle feeding. Discussed breastcare for engorgement. Home on iron and stool softeners. Objective:   Breasts Nontender and intact  Fundus firm and involuting  Lochia rubra, minimal  Legs minimal to no edema and without pain.      Lab/Data Review:  Patient Vitals for the past 8 hrs:   BP Temp Pulse Resp SpO2   18 0852 119/73 98.6 °F (37 °C) 77 16 99 %   18 0320 110/73 98.1 °F (36.7 °C) 69 20 99 %       Temp (24hrs), Av.2 °F (36.8 °C), Min:97.8 °F (36.6 °C), Max:98.6 °F (37 °C)      WBC   Date/Time Value Ref Range Status   2018 05:05 AM 7.4 4.6 - 13.2 K/uL Final   10/27/2018 10:30 AM 7.4 4.6 - 13.2 K/uL Final   2017 01:20 AM 7.7 4.6 - 13.2 K/uL Final     HGB   Date/Time Value Ref Range Status   2018 05:50 AM 9.2 (L) 12.0 - 16.0 g/dL Final   2018 05:05 AM 9.9 (L) 12.0 - 16.0 g/dL Final   10/27/2018 10:30 AM 12.0 12.0 - 16.0 g/dL Final     PLATELET   Date/Time Value Ref Range Status   2018 05:05  135 - 420 K/uL Final     Hgb, External   Date/Time Value Ref Range Status   05/02/2018 11.3  Final     Platelet cnt., External   Date/Time Value Ref Range Status   05/02/2018 277  Final     Patient Instructions:   Current Discharge Medication List      START taking these medications    Details   docusate sodium (COLACE) 100 mg capsule Take 1 Cap by mouth two (2) times daily as needed for Constipation for up to 90 days. Qty: 60 Cap, Refills: 2      ibuprofen (MOTRIN) 800 mg tablet Take 1 Tab by mouth every eight (8) hours as needed. Qty: 60 Tab, Refills: 1      iron, carbonyl (FEOSOL) 45 mg tab Take 1 Tab by mouth daily. Qty: 30 Tab, Refills: 1         STOP taking these medications       clindamycin (CLEOCIN) 2 % vaginal cream Comments:   Reason for Stopping:         hydroxyprogest,PF,,preg presv, (MARY CARMEN, PF,) 275 mg/1.1 mL atIn Comments:   Reason for Stopping:               Assessment:     Status post: postpartum vaginal delivery   Recovering well  Breastfeeding  Mood Stable      Plan:     Postpartum care discussed including diet, ambulation,actvitiy restrictions, and mood fluctuations. Discharge instructions and questions answered for vaginal delivery.   Follow in 6 wks or prn      Signed By: Brandon Baires CNM     November 9, 2018

## 2018-11-09 NOTE — DISCHARGE INSTRUCTIONS

## 2018-11-19 NOTE — DISCHARGE SUMMARY
Obstetrical Discharge Summary     Name: Satish Freeman MRN: [id number]  SSN: [id number]    YOB: 1980  Age: 34 y.o. Sex: female      Admit Date: 10/27/2018    Discharge Date: 10/27/18    Admitting Physician: Garen Siemens MD    Attending Physician:  No att. providers found     Admission Diagnoses:   1.  contractions  2. Cerclage in place    Discharge Diagnoses:   1.  contractions resolved  2. Cerclage removed     Additional Diagnoses:    Lab Results   Component Value Date/Time    Rubella, External Immune 2018    GrBStrep, External negative 2018       Immunization(s):   Immunization History   Administered Date(s) Administered    DTaP 10/26/2017    Influenza Vaccine 2017    Influenza Vaccine (Quad) PF 2018        Labs: No results found for this or any previous visit (from the past 24 hour(s)). Exam:  Chest is clear to auscultation. Fundus firm and nontender  Bowel sounds are normal  Legs are normal without tenderness, swelling, or redness    Hospital Course:  Patient admitted with  contractions and suspected labor. Cervix with cerclage in place and 2cm dilated. Cerclage removed. Contractions resolved and no further cervical change noted. Discharged to home.          Signed By:  Mary Carmen Grimes MD     2018

## 2018-12-23 ENCOUNTER — APPOINTMENT (OUTPATIENT)
Dept: ULTRASOUND IMAGING | Age: 38
End: 2018-12-23
Attending: EMERGENCY MEDICINE
Payer: COMMERCIAL

## 2018-12-23 ENCOUNTER — HOSPITAL ENCOUNTER (EMERGENCY)
Age: 38
Discharge: HOME | End: 2018-12-23
Attending: EMERGENCY MEDICINE
Payer: COMMERCIAL

## 2018-12-23 VITALS
DIASTOLIC BLOOD PRESSURE: 70 MMHG | HEART RATE: 78 BPM | OXYGEN SATURATION: 100 % | RESPIRATION RATE: 16 BRPM | WEIGHT: 156 LBS | HEIGHT: 70 IN | BODY MASS INDEX: 22.33 KG/M2 | SYSTOLIC BLOOD PRESSURE: 107 MMHG | TEMPERATURE: 98 F

## 2018-12-23 DIAGNOSIS — R10.30 LOWER ABDOMINAL PAIN: ICD-10-CM

## 2018-12-23 DIAGNOSIS — N93.9 VAGINAL BLEEDING: Primary | ICD-10-CM

## 2018-12-23 LAB
ALBUMIN SERPL-MCNC: 3.4 G/DL (ref 3.4–5)
ALBUMIN/GLOB SERPL: 0.9 {RATIO} (ref 0.8–1.7)
ALP SERPL-CCNC: 76 U/L (ref 45–117)
ALT SERPL-CCNC: 19 U/L (ref 13–56)
ANION GAP SERPL CALC-SCNC: 5 MMOL/L (ref 3–18)
AST SERPL-CCNC: 14 U/L (ref 15–37)
BASOPHILS # BLD: 0 K/UL (ref 0–0.1)
BASOPHILS NFR BLD: 0 % (ref 0–2)
BILIRUB SERPL-MCNC: 0.6 MG/DL (ref 0.2–1)
BUN SERPL-MCNC: 7 MG/DL (ref 7–18)
BUN/CREAT SERPL: 8 (ref 12–20)
CALCIUM SERPL-MCNC: 8.8 MG/DL (ref 8.5–10.1)
CHLORIDE SERPL-SCNC: 106 MMOL/L (ref 100–108)
CO2 SERPL-SCNC: 29 MMOL/L (ref 21–32)
CREAT SERPL-MCNC: 0.91 MG/DL (ref 0.6–1.3)
DIFFERENTIAL METHOD BLD: ABNORMAL
EOSINOPHIL # BLD: 0.1 K/UL (ref 0–0.4)
EOSINOPHIL NFR BLD: 3 % (ref 0–5)
ERYTHROCYTE [DISTWIDTH] IN BLOOD BY AUTOMATED COUNT: 15.3 % (ref 11.6–14.5)
GLOBULIN SER CALC-MCNC: 4 G/DL (ref 2–4)
GLUCOSE SERPL-MCNC: 88 MG/DL (ref 74–99)
HCT VFR BLD AUTO: 40.4 % (ref 35–45)
HGB BLD-MCNC: 12.7 G/DL (ref 12–16)
INR PPP: 1 (ref 0.8–1.2)
LYMPHOCYTES # BLD: 1 K/UL (ref 0.9–3.6)
LYMPHOCYTES NFR BLD: 21 % (ref 21–52)
MCH RBC QN AUTO: 26.4 PG (ref 24–34)
MCHC RBC AUTO-ENTMCNC: 31.4 G/DL (ref 31–37)
MCV RBC AUTO: 84 FL (ref 74–97)
MONOCYTES # BLD: 0.4 K/UL (ref 0.05–1.2)
MONOCYTES NFR BLD: 9 % (ref 3–10)
NEUTS SEG # BLD: 3.2 K/UL (ref 1.8–8)
NEUTS SEG NFR BLD: 67 % (ref 40–73)
PLATELET # BLD AUTO: 237 K/UL (ref 135–420)
PMV BLD AUTO: 11.4 FL (ref 9.2–11.8)
POTASSIUM SERPL-SCNC: 3.5 MMOL/L (ref 3.5–5.5)
PROT SERPL-MCNC: 7.4 G/DL (ref 6.4–8.2)
PROTHROMBIN TIME: 12.8 SEC (ref 11.5–15.2)
RBC # BLD AUTO: 4.81 M/UL (ref 4.2–5.3)
SODIUM SERPL-SCNC: 140 MMOL/L (ref 136–145)
WBC # BLD AUTO: 4.8 K/UL (ref 4.6–13.2)

## 2018-12-23 PROCEDURE — 80053 COMPREHEN METABOLIC PANEL: CPT

## 2018-12-23 PROCEDURE — 99282 EMERGENCY DEPT VISIT SF MDM: CPT

## 2018-12-23 PROCEDURE — 76830 TRANSVAGINAL US NON-OB: CPT

## 2018-12-23 PROCEDURE — 96360 HYDRATION IV INFUSION INIT: CPT

## 2018-12-23 PROCEDURE — 85025 COMPLETE CBC W/AUTO DIFF WBC: CPT

## 2018-12-23 PROCEDURE — 74011250636 HC RX REV CODE- 250/636: Performed by: EMERGENCY MEDICINE

## 2018-12-23 PROCEDURE — 96361 HYDRATE IV INFUSION ADD-ON: CPT

## 2018-12-23 PROCEDURE — 85610 PROTHROMBIN TIME: CPT

## 2018-12-23 RX ORDER — LANOLIN ALCOHOL/MO/W.PET/CERES
325 CREAM (GRAM) TOPICAL
COMMUNITY

## 2018-12-23 RX ADMIN — SODIUM CHLORIDE 1000 ML: 900 INJECTION, SOLUTION INTRAVENOUS at 08:48

## 2018-12-23 NOTE — ED PROVIDER NOTES
EMERGENCY DEPARTMENT HISTORY AND PHYSICAL EXAM    7:52 AM      Date: 2018  Patient Name: Lucy Emanuel    History of Presenting Illness     Chief Complaint   Patient presents with    Post-Partum Complications         History Provided By: Patient    Chief Complaint: vaginal bleeding  Duration:  3 days  Timing:  Worsening  Location: vagina    Quality: heavy with clots  Severity: 6 out of 10  Modifying Factors: Pt is using 1 pad an hour. Associated Symptoms: Denies light headedness, CP, SOB, dizziness, and abd pain. Additional History (Context): Lucy Emanuel is a 34 y.o. female with  delivery and anemia who presents with worsening heavy 6/10 vaginal bleeding with clots for the past 3 days. Denies light headedness, CP, SOB, dizziness, and abd pain. Pt is using more than 1 pad an hour. She called her OB Dr. Mendy Puentes this morning, Pt does not drink or smoke. PCP: Galina Young MD    Current Outpatient Medications   Medication Sig Dispense Refill    ferrous sulfate 325 mg (65 mg iron) tablet 325 mg. Past History     Past Medical History:  Past Medical History:   Diagnosis Date    Anemia affecting pregnancy in third trimester 2018     delivery 2000       Past Surgical History:  Past Surgical History:   Procedure Laterality Date    HX BREAST AUGMENTATION Bilateral     HX OTHER SURGICAL      Right foot    HX OTHER SURGICAL      Tubal reversal    HX TUBAL LIGATION         Family History:  History reviewed. No pertinent family history. Social History:  Social History     Tobacco Use    Smoking status: Never Smoker    Smokeless tobacco: Never Used   Substance Use Topics    Alcohol use: No    Drug use: No       Allergies:  No Known Allergies      Review of Systems       Review of Systems   Constitutional: Negative for activity change, fatigue and fever. HENT: Negative for congestion and rhinorrhea. Eyes: Negative for visual disturbance. Respiratory: Negative for shortness of breath. Cardiovascular: Negative for chest pain and palpitations. Gastrointestinal: Negative for abdominal pain, diarrhea, nausea and vomiting. Genitourinary: Positive for vaginal bleeding. Negative for dysuria and hematuria. Musculoskeletal: Negative for back pain. Skin: Negative for rash. Neurological: Negative for dizziness, weakness and light-headedness. Psychiatric/Behavioral: Negative for agitation. All other systems reviewed and are negative. Physical Exam     Visit Vitals  /70 (BP 1 Location: Right arm, BP Patient Position: Sitting)   Pulse 78   Temp 98 °F (36.7 °C)   Resp 16   Ht 5' 10\" (1.778 m)   Wt 70.8 kg (156 lb)   SpO2 100%   BMI 22.38 kg/m²         Physical Exam   Constitutional: She appears well-developed and well-nourished. No distress. HENT:   Head: Normocephalic and atraumatic. Right Ear: External ear normal.   Left Ear: External ear normal.   Nose: Nose normal.   Mouth/Throat: Oropharynx is clear and moist.   Eyes: Conjunctivae and EOM are normal. Pupils are equal, round, and reactive to light. No scleral icterus. Neck: Normal range of motion. Neck supple. No JVD present. No tracheal deviation present. No thyromegaly present. Cardiovascular: Normal rate and regular rhythm. Exam reveals no friction rub. No murmur heard. Pulmonary/Chest: Effort normal and breath sounds normal. No stridor. She exhibits no tenderness. Abdominal: Soft. Bowel sounds are normal. She exhibits no distension. There is no tenderness. There is no rebound and no guarding. Genitourinary:   Genitourinary Comments: Pt declined pelvic exam   Musculoskeletal: Normal range of motion. She exhibits no edema or tenderness. Lymphadenopathy:     She has no cervical adenopathy. Neurological: She is alert. No cranial nerve deficit. Coordination normal.   Skin: Skin is warm and dry. Psychiatric: She has a normal mood and affect.  Her behavior is normal. Judgment and thought content normal.   Nursing note and vitals reviewed. Diagnostic Study Results     Labs -  Recent Results (from the past 12 hour(s))   CBC WITH AUTOMATED DIFF    Collection Time: 12/23/18  8:20 AM   Result Value Ref Range    WBC 4.8 4.6 - 13.2 K/uL    RBC 4.81 4.20 - 5.30 M/uL    HGB 12.7 12.0 - 16.0 g/dL    HCT 40.4 35.0 - 45.0 %    MCV 84.0 74.0 - 97.0 FL    MCH 26.4 24.0 - 34.0 PG    MCHC 31.4 31.0 - 37.0 g/dL    RDW 15.3 (H) 11.6 - 14.5 %    PLATELET 189 128 - 034 K/uL    MPV 11.4 9.2 - 11.8 FL    NEUTROPHILS 67 40 - 73 %    LYMPHOCYTES 21 21 - 52 %    MONOCYTES 9 3 - 10 %    EOSINOPHILS 3 0 - 5 %    BASOPHILS 0 0 - 2 %    ABS. NEUTROPHILS 3.2 1.8 - 8.0 K/UL    ABS. LYMPHOCYTES 1.0 0.9 - 3.6 K/UL    ABS. MONOCYTES 0.4 0.05 - 1.2 K/UL    ABS. EOSINOPHILS 0.1 0.0 - 0.4 K/UL    ABS. BASOPHILS 0.0 0.0 - 0.1 K/UL    DF AUTOMATED     METABOLIC PANEL, COMPREHENSIVE    Collection Time: 12/23/18  8:20 AM   Result Value Ref Range    Sodium 140 136 - 145 mmol/L    Potassium 3.5 3.5 - 5.5 mmol/L    Chloride 106 100 - 108 mmol/L    CO2 29 21 - 32 mmol/L    Anion gap 5 3.0 - 18 mmol/L    Glucose 88 74 - 99 mg/dL    BUN 7 7.0 - 18 MG/DL    Creatinine 0.91 0.6 - 1.3 MG/DL    BUN/Creatinine ratio 8 (L) 12 - 20      GFR est AA >60 >60 ml/min/1.73m2    GFR est non-AA >60 >60 ml/min/1.73m2    Calcium 8.8 8.5 - 10.1 MG/DL    Bilirubin, total 0.6 0.2 - 1.0 MG/DL    ALT (SGPT) 19 13 - 56 U/L    AST (SGOT) 14 (L) 15 - 37 U/L    Alk. phosphatase 76 45 - 117 U/L    Protein, total 7.4 6.4 - 8.2 g/dL    Albumin 3.4 3.4 - 5.0 g/dL    Globulin 4.0 2.0 - 4.0 g/dL    A-G Ratio 0.9 0.8 - 1.7     PROTHROMBIN TIME + INR    Collection Time: 12/23/18  8:20 AM   Result Value Ref Range    Prothrombin time 12.8 11.5 - 15.2 sec    INR 1.0 0.8 - 1.2         Radiologic Studies -   US TRANSVAGINAL    (Results Pending)         Medical Decision Making   I am the first provider for this patient.     I reviewed the vital signs, available nursing notes, past medical history, past surgical history, family history and social history. Vital Signs-Reviewed the patient's vital signs. Pulse Oximetry Analysis -  100% on room air (Interpretation)nl    Records Reviewed: Nursing Notes (Time of Review: 7:52 AM)    ED Course: Progress Notes, Reevaluation, and Consults:  11:13 AM  White count 12.7. Electrolytes nl. coag's nl. Platelets nl. Awaiting US. 12:39 PM  Pt has no bleeding or pain in the ED. no abd pain. states that she would like to get her US results but cannot wait. Pt called [phone]. Us explained. No distress, at home  She will follow up. Provider Notes (Medical Decision Making): Postpartum 6 weeks increased vaginal bleeding intermittent abd cramps on PE, no abd pain or tenderness. PT feels she would defer pelvic unless needed. No vaginal bleeding. If US negative, pt can be sent home if hemoglobin is stable. Diagnosis     Clinical Impression:   1. Vaginal bleeding    2.  Lower abdominal pain        Disposition: HOME    Follow-up Information     Follow up With Specialties Details Why Contact Info    [facility] Lab, MD Family Practice Call in 1 day Follow Up From Emergency Department [address] [facility] [facility] [address] [facility] EMERGENCY DEPT Emergency Medicine   [address]  [phone]    your pregancy doctor  Call in 1 day Follow Up From Emergency Department               Medication List      ASK your doctor about these medications    ferrous sulfate 325 mg (65 mg iron) tablet          _______________________________       Scribe Rosemarie Banuelos acting as a scribe for and in the presence of Viridiana Yousif MD      December 23, 2018 at 7:52 AM       Provider Attestation:      I personally performed the services described in the documentation, reviewed the documentation, as recorded by the scribe in my presence, and it accurately and completely records my words and actions.  December 23, 2018 at 7:52 KAIN - Isidra Garcia MD        __________________  _____________

## 2018-12-23 NOTE — DISCHARGE INSTRUCTIONS
Abnormal Uterine Bleeding: Care Instructions  Your Care Instructions    Abnormal uterine bleeding (AUB) is irregular bleeding from the uterus that is longer or heavier than usual or does not occur at your regular time. Sometimes it is caused by changes in hormone levels. It can also be caused by growths in the uterus, such as fibroids or polyps. Sometimes a cause cannot be found. You may have heavy bleeding when you are not expecting your period. Your doctor may suggest a pregnancy test, if you think you are pregnant. Follow-up care is a key part of your treatment and safety. Be sure to make and go to all appointments, and call your doctor if you are having problems. It's also a good idea to know your test results and keep a list of the medicines you take. How can you care for yourself at home? · Be safe with medicines. Take pain medicines exactly as directed. ? If the doctor gave you a prescription medicine for pain, take it as prescribed. ? If you are not taking a prescription pain medicine, ask your doctor if you can take an over-the-counter medicine. · You may be low in iron because of blood loss. Eat a balanced diet that is high in iron and vitamin C. Foods rich in iron include red meat, shellfish, eggs, beans, and leafy green vegetables. Talk to your doctor about whether you need to take iron pills or a multivitamin. When should you call for help? Call 911 anytime you think you may need emergency care. For example, call if:    · You passed out (lost consciousness).    Call your doctor now or seek immediate medical care if:    · You have new or worse belly or pelvic pain.     · You have severe vaginal bleeding.     · You feel dizzy or lightheaded, or you feel like you may faint.    Watch closely for changes in your health, and be sure to contact your doctor if:    · You think you may be pregnant.     · Your bleeding gets worse.     · You do not get better as expected.    Where can you learn more?  Go to http://rodney-lambert.info/. Enter V023 in the search box to learn more about \"Abnormal Uterine Bleeding: Care Instructions. \"  Current as of: May 15, 2018  Content Version: 11.8  © 6665-5580 [employer], [employer]. Care instructions adapted under license by [employer] (which disclaims liability or warranty for this information). If you have questions about a medical condition or this instruction, always ask your healthcare professional. [place] [address] any warranty or liability for your use of this information.

## 2020-01-01 NOTE — PROGRESS NOTES
Reviewed discharge instructions . Verbalizes understanding  Of self care. Denies questions, problems or c/o. Dr Cholo Goodrich at bedside giving discharge instructions.    Discharged to home (1) body pink, extremities blue

## 2022-07-31 NOTE — IP AVS SNAPSHOT
[address] Patient: Brant French 
MRN: [id number] My Medications TAKE these medications as instructed Instructions Each Dose to Equal  
 Morning Noon Evening Bedtime  
 progesterone 200 mg capsule Commonly known as:  PROMETRIUM Your last dose was: Your next dose is: Insert 200 mg into rectum daily.   
 200 mg  
    
   
   
   
  
  
 poor balance